# Patient Record
Sex: FEMALE | Race: WHITE | NOT HISPANIC OR LATINO | Employment: OTHER | URBAN - NONMETROPOLITAN AREA
[De-identification: names, ages, dates, MRNs, and addresses within clinical notes are randomized per-mention and may not be internally consistent; named-entity substitution may affect disease eponyms.]

---

## 2017-04-03 ENCOUNTER — TRANSCRIBE ORDERS (OUTPATIENT)
Dept: ADMINISTRATIVE | Facility: HOSPITAL | Age: 47
End: 2017-04-03

## 2017-04-03 DIAGNOSIS — Z12.31 VISIT FOR SCREENING MAMMOGRAM: Primary | ICD-10-CM

## 2017-04-26 ENCOUNTER — HOSPITAL ENCOUNTER (OUTPATIENT)
Dept: MAMMOGRAPHY | Facility: HOSPITAL | Age: 47
Discharge: HOME | End: 2017-04-26

## 2018-12-06 ENCOUNTER — APPOINTMENT (OUTPATIENT)
Dept: WOMENS IMAGING | Facility: HOSPITAL | Age: 48
End: 2018-12-06

## 2018-12-06 PROCEDURE — 77067 SCR MAMMO BI INCL CAD: CPT | Performed by: RADIOLOGY

## 2018-12-06 PROCEDURE — 77063 BREAST TOMOSYNTHESIS BI: CPT | Performed by: RADIOLOGY

## 2020-02-10 ENCOUNTER — APPOINTMENT (OUTPATIENT)
Dept: CT IMAGING | Facility: HOSPITAL | Age: 50
End: 2020-02-10

## 2020-02-10 ENCOUNTER — APPOINTMENT (OUTPATIENT)
Dept: ULTRASOUND IMAGING | Facility: HOSPITAL | Age: 50
End: 2020-02-10

## 2020-02-10 ENCOUNTER — APPOINTMENT (OUTPATIENT)
Dept: NUCLEAR MEDICINE | Facility: HOSPITAL | Age: 50
End: 2020-02-10

## 2020-02-10 ENCOUNTER — HOSPITAL ENCOUNTER (INPATIENT)
Facility: HOSPITAL | Age: 50
LOS: 3 days | Discharge: HOME | End: 2020-02-13
Attending: FAMILY MEDICINE | Admitting: FAMILY MEDICINE

## 2020-02-10 DIAGNOSIS — I95.9 HYPOTENSION, UNSPECIFIED HYPOTENSION TYPE: Primary | ICD-10-CM

## 2020-02-10 DIAGNOSIS — N17.9 ACUTE RENAL FAILURE, UNSPECIFIED ACUTE RENAL FAILURE TYPE (HCC): ICD-10-CM

## 2020-02-10 DIAGNOSIS — R40.0 DROWSINESS: ICD-10-CM

## 2020-02-10 LAB
6-ACETYL MORPHINE: NEGATIVE
A-A DO2: 14.8 MMHG (ref 0–300)
ALBUMIN SERPL-MCNC: 3.33 G/DL (ref 3.5–5.2)
ALBUMIN/GLOB SERPL: 0.8 G/DL
ALP SERPL-CCNC: 95 U/L (ref 39–117)
ALT SERPL W P-5'-P-CCNC: 23 U/L (ref 1–33)
AMPHET+METHAMPHET UR QL: NEGATIVE
ANION GAP SERPL CALCULATED.3IONS-SCNC: 13.5 MMOL/L (ref 5–15)
APAP SERPL-MCNC: <5 MCG/ML (ref 10–30)
ARTERIAL PATENCY WRIST A: POSITIVE
AST SERPL-CCNC: 50 U/L (ref 1–32)
ATMOSPHERIC PRESS: 729 MMHG
B-HCG UR QL: NEGATIVE
BACTERIA UR QL AUTO: ABNORMAL /HPF
BARBITURATES UR QL SCN: NEGATIVE
BASE EXCESS BLDA CALC-SCNC: 2 MMOL/L (ref 0–2)
BASOPHILS # BLD AUTO: 0.05 10*3/MM3 (ref 0–0.2)
BASOPHILS NFR BLD AUTO: 0.6 % (ref 0–1.5)
BDY SITE: ABNORMAL
BENZODIAZ UR QL SCN: POSITIVE
BILIRUB SERPL-MCNC: 0.6 MG/DL (ref 0.2–1.2)
BILIRUB UR QL STRIP: ABNORMAL
BODY TEMPERATURE: 0 C
BUN BLD-MCNC: 15 MG/DL (ref 6–20)
BUN/CREAT SERPL: 9.9 (ref 7–25)
BUPRENORPHINE SERPL-MCNC: NEGATIVE NG/ML
CALCIUM SPEC-SCNC: 8.9 MG/DL (ref 8.6–10.5)
CANNABINOIDS SERPL QL: NEGATIVE
CHLORIDE SERPL-SCNC: 97 MMOL/L (ref 98–107)
CLARITY UR: ABNORMAL
CO2 BLDA-SCNC: 29.2 MMOL/L (ref 22–33)
CO2 SERPL-SCNC: 28.5 MMOL/L (ref 22–29)
COCAINE UR QL: NEGATIVE
COHGB MFR BLD: 2.6 % (ref 0–5)
COLOR UR: ABNORMAL
CREAT BLD-MCNC: 1.52 MG/DL (ref 0.57–1)
CRP SERPL-MCNC: 0.17 MG/DL (ref 0–0.5)
D DIMER PPP FEU-MCNC: 0.6 MCGFEU/ML (ref 0–0.5)
D-LACTATE SERPL-SCNC: 1.6 MMOL/L (ref 0.5–2)
D-LACTATE SERPL-SCNC: 2.3 MMOL/L (ref 0.5–2)
DEPRECATED RDW RBC AUTO: 51.5 FL (ref 37–54)
EOSINOPHIL # BLD AUTO: 0.23 10*3/MM3 (ref 0–0.4)
EOSINOPHIL NFR BLD AUTO: 2.7 % (ref 0.3–6.2)
ERYTHROCYTE [DISTWIDTH] IN BLOOD BY AUTOMATED COUNT: 13.5 % (ref 12.3–15.4)
ERYTHROCYTE [SEDIMENTATION RATE] IN BLOOD: 17 MM/HR (ref 0–20)
ETHANOL BLD-MCNC: <10 MG/DL (ref 0–10)
ETHANOL UR QL: <0.01 %
GFR SERPL CREATININE-BSD FRML MDRD: 37 ML/MIN/1.73
GLOBULIN UR ELPH-MCNC: 4 GM/DL
GLUCOSE BLD-MCNC: 169 MG/DL (ref 65–99)
GLUCOSE BLDC GLUCOMTR-MCNC: 215 MG/DL (ref 70–130)
GLUCOSE BLDC GLUCOMTR-MCNC: 79 MG/DL (ref 70–130)
GLUCOSE UR STRIP-MCNC: NEGATIVE MG/DL
HAV IGM SERPL QL IA: NORMAL
HBA1C MFR BLD: 8.6 % (ref 4.8–5.6)
HBV CORE IGM SERPL QL IA: NORMAL
HBV SURFACE AG SERPL QL IA: NORMAL
HCO3 BLDA-SCNC: 27.8 MMOL/L (ref 20–26)
HCT VFR BLD AUTO: 44.1 % (ref 34–46.6)
HCT VFR BLD CALC: 42.1 % (ref 38–51)
HCV AB SER DONR QL: NORMAL
HGB BLD-MCNC: 14.3 G/DL (ref 12–15.9)
HGB BLDA-MCNC: 13.7 G/DL (ref 13.5–17.5)
HGB UR QL STRIP.AUTO: NEGATIVE
HOLD SPECIMEN: NORMAL
HOROWITZ INDEX BLD+IHG-RTO: 21 %
HYALINE CASTS UR QL AUTO: ABNORMAL /LPF
IMM GRANULOCYTES # BLD AUTO: 0.01 10*3/MM3 (ref 0–0.05)
IMM GRANULOCYTES NFR BLD AUTO: 0.1 % (ref 0–0.5)
KETONES UR QL STRIP: ABNORMAL
LEUKOCYTE ESTERASE UR QL STRIP.AUTO: ABNORMAL
LIPASE SERPL-CCNC: 23 U/L (ref 13–60)
LYMPHOCYTES # BLD AUTO: 4.64 10*3/MM3 (ref 0.7–3.1)
LYMPHOCYTES NFR BLD AUTO: 55.1 % (ref 19.6–45.3)
Lab: ABNORMAL
MCH RBC QN AUTO: 32.9 PG (ref 26.6–33)
MCHC RBC AUTO-ENTMCNC: 32.4 G/DL (ref 31.5–35.7)
MCV RBC AUTO: 101.6 FL (ref 79–97)
METHADONE UR QL SCN: NEGATIVE
METHGB BLD QL: 0.2 % (ref 0–3)
MODALITY: ABNORMAL
MONOCYTES # BLD AUTO: 0.73 10*3/MM3 (ref 0.1–0.9)
MONOCYTES NFR BLD AUTO: 8.7 % (ref 5–12)
NEUTROPHILS # BLD AUTO: 2.76 10*3/MM3 (ref 1.7–7)
NEUTROPHILS NFR BLD AUTO: 32.8 % (ref 42.7–76)
NITRITE UR QL STRIP: POSITIVE
NOTE: ABNORMAL
NOTIFIED BY: ABNORMAL
NOTIFIED WHO: ABNORMAL
NRBC BLD AUTO-RTO: 0 /100 WBC (ref 0–0.2)
OPIATES UR QL: NEGATIVE
OXYCODONE UR QL SCN: NEGATIVE
OXYHGB MFR BLDV: 92.6 % (ref 94–99)
PCO2 BLDA: 46.8 MM HG (ref 35–45)
PCO2 TEMP ADJ BLD: ABNORMAL MM[HG]
PCP UR QL SCN: NEGATIVE
PH BLDA: 7.38 PH UNITS (ref 7.35–7.45)
PH UR STRIP.AUTO: 6 [PH] (ref 5–8)
PH, TEMP CORRECTED: ABNORMAL
PLATELET # BLD AUTO: 103 10*3/MM3 (ref 140–450)
PMV BLD AUTO: 12.1 FL (ref 6–12)
PO2 BLDA: 75.7 MM HG (ref 83–108)
PO2 TEMP ADJ BLD: ABNORMAL MM[HG]
POTASSIUM BLD-SCNC: 3.9 MMOL/L (ref 3.5–5.2)
PROT SERPL-MCNC: 7.3 G/DL (ref 6–8.5)
PROT UR QL STRIP: ABNORMAL
RBC # BLD AUTO: 4.34 10*6/MM3 (ref 3.77–5.28)
RBC # UR: ABNORMAL /HPF
REF LAB TEST METHOD: ABNORMAL
SAO2 % BLDCOA: 95.3 % (ref 94–99)
SMALL PLATELETS BLD QL SMEAR: NORMAL
SODIUM BLD-SCNC: 139 MMOL/L (ref 136–145)
SP GR UR STRIP: 1.02 (ref 1–1.03)
SQUAMOUS #/AREA URNS HPF: ABNORMAL /HPF
STOMATOCYTES BLD QL SMEAR: NORMAL
TSH SERPL DL<=0.05 MIU/L-ACNC: 2.37 UIU/ML (ref 0.27–4.2)
UROBILINOGEN UR QL STRIP: ABNORMAL
VENTILATOR MODE: ABNORMAL
WBC NRBC COR # BLD: 8.42 10*3/MM3 (ref 3.4–10.8)
WBC UR QL AUTO: ABNORMAL /HPF

## 2020-02-10 PROCEDURE — 82375 ASSAY CARBOXYHB QUANT: CPT

## 2020-02-10 PROCEDURE — 80307 DRUG TEST PRSMV CHEM ANLYZR: CPT | Performed by: PHYSICIAN ASSISTANT

## 2020-02-10 PROCEDURE — 71250 CT THORAX DX C-: CPT | Performed by: RADIOLOGY

## 2020-02-10 PROCEDURE — 85007 BL SMEAR W/DIFF WBC COUNT: CPT | Performed by: PHYSICIAN ASSISTANT

## 2020-02-10 PROCEDURE — 84443 ASSAY THYROID STIM HORMONE: CPT | Performed by: PHYSICIAN ASSISTANT

## 2020-02-10 PROCEDURE — 83036 HEMOGLOBIN GLYCOSYLATED A1C: CPT | Performed by: NURSE PRACTITIONER

## 2020-02-10 PROCEDURE — 87040 BLOOD CULTURE FOR BACTERIA: CPT | Performed by: PHYSICIAN ASSISTANT

## 2020-02-10 PROCEDURE — 80053 COMPREHEN METABOLIC PANEL: CPT | Performed by: PHYSICIAN ASSISTANT

## 2020-02-10 PROCEDURE — 25010000002 ONDANSETRON PER 1 MG: Performed by: EMERGENCY MEDICINE

## 2020-02-10 PROCEDURE — 94799 UNLISTED PULMONARY SVC/PX: CPT

## 2020-02-10 PROCEDURE — 80307 DRUG TEST PRSMV CHEM ANLYZR: CPT | Performed by: NURSE PRACTITIONER

## 2020-02-10 PROCEDURE — 70450 CT HEAD/BRAIN W/O DYE: CPT | Performed by: RADIOLOGY

## 2020-02-10 PROCEDURE — 51798 US URINE CAPACITY MEASURE: CPT

## 2020-02-10 PROCEDURE — 0 TECHNETIUM TC 99M PENTETATE INHALER: Performed by: EMERGENCY MEDICINE

## 2020-02-10 PROCEDURE — 87147 CULTURE TYPE IMMUNOLOGIC: CPT | Performed by: PHYSICIAN ASSISTANT

## 2020-02-10 PROCEDURE — 82805 BLOOD GASES W/O2 SATURATION: CPT

## 2020-02-10 PROCEDURE — 74176 CT ABD & PELVIS W/O CONTRAST: CPT

## 2020-02-10 PROCEDURE — 87086 URINE CULTURE/COLONY COUNT: CPT | Performed by: PHYSICIAN ASSISTANT

## 2020-02-10 PROCEDURE — A9540 TC99M MAA: HCPCS | Performed by: EMERGENCY MEDICINE

## 2020-02-10 PROCEDURE — 0 TECHNETIUM ALBUMIN AGGREGATED: Performed by: EMERGENCY MEDICINE

## 2020-02-10 PROCEDURE — 83050 HGB METHEMOGLOBIN QUAN: CPT

## 2020-02-10 PROCEDURE — 82607 VITAMIN B-12: CPT | Performed by: NURSE PRACTITIONER

## 2020-02-10 PROCEDURE — 82962 GLUCOSE BLOOD TEST: CPT

## 2020-02-10 PROCEDURE — 93005 ELECTROCARDIOGRAM TRACING: CPT | Performed by: NURSE PRACTITIONER

## 2020-02-10 PROCEDURE — 76705 ECHO EXAM OF ABDOMEN: CPT | Performed by: RADIOLOGY

## 2020-02-10 PROCEDURE — 85379 FIBRIN DEGRADATION QUANT: CPT | Performed by: PHYSICIAN ASSISTANT

## 2020-02-10 PROCEDURE — 93010 ELECTROCARDIOGRAM REPORT: CPT | Performed by: INTERNAL MEDICINE

## 2020-02-10 PROCEDURE — 80074 ACUTE HEPATITIS PANEL: CPT | Performed by: NURSE PRACTITIONER

## 2020-02-10 PROCEDURE — A9567 TECHNETIUM TC-99M AEROSOL: HCPCS | Performed by: EMERGENCY MEDICINE

## 2020-02-10 PROCEDURE — 25010000002 HEPARIN (PORCINE) PER 1000 UNITS: Performed by: FAMILY MEDICINE

## 2020-02-10 PROCEDURE — 85025 COMPLETE CBC W/AUTO DIFF WBC: CPT | Performed by: PHYSICIAN ASSISTANT

## 2020-02-10 PROCEDURE — 81025 URINE PREGNANCY TEST: CPT | Performed by: PHYSICIAN ASSISTANT

## 2020-02-10 PROCEDURE — 36415 COLL VENOUS BLD VENIPUNCTURE: CPT

## 2020-02-10 PROCEDURE — 74176 CT ABD & PELVIS W/O CONTRAST: CPT | Performed by: RADIOLOGY

## 2020-02-10 PROCEDURE — 78582 LUNG VENTILAT&PERFUS IMAGING: CPT | Performed by: RADIOLOGY

## 2020-02-10 PROCEDURE — 81001 URINALYSIS AUTO W/SCOPE: CPT | Performed by: PHYSICIAN ASSISTANT

## 2020-02-10 PROCEDURE — 85652 RBC SED RATE AUTOMATED: CPT | Performed by: PHYSICIAN ASSISTANT

## 2020-02-10 PROCEDURE — 99223 1ST HOSP IP/OBS HIGH 75: CPT | Performed by: NURSE PRACTITIONER

## 2020-02-10 PROCEDURE — 99284 EMERGENCY DEPT VISIT MOD MDM: CPT

## 2020-02-10 PROCEDURE — 36600 WITHDRAWAL OF ARTERIAL BLOOD: CPT

## 2020-02-10 PROCEDURE — 71250 CT THORAX DX C-: CPT

## 2020-02-10 PROCEDURE — 25010000002 CEFTRIAXONE: Performed by: PHYSICIAN ASSISTANT

## 2020-02-10 PROCEDURE — 93970 EXTREMITY STUDY: CPT

## 2020-02-10 PROCEDURE — 82746 ASSAY OF FOLIC ACID SERUM: CPT | Performed by: NURSE PRACTITIONER

## 2020-02-10 PROCEDURE — 93970 EXTREMITY STUDY: CPT | Performed by: RADIOLOGY

## 2020-02-10 PROCEDURE — 83690 ASSAY OF LIPASE: CPT | Performed by: PHYSICIAN ASSISTANT

## 2020-02-10 PROCEDURE — 70450 CT HEAD/BRAIN W/O DYE: CPT

## 2020-02-10 PROCEDURE — 63710000001 INSULIN ASPART PER 5 UNITS: Performed by: NURSE PRACTITIONER

## 2020-02-10 PROCEDURE — 86140 C-REACTIVE PROTEIN: CPT | Performed by: PHYSICIAN ASSISTANT

## 2020-02-10 PROCEDURE — 83605 ASSAY OF LACTIC ACID: CPT | Performed by: PHYSICIAN ASSISTANT

## 2020-02-10 PROCEDURE — 76705 ECHO EXAM OF ABDOMEN: CPT

## 2020-02-10 PROCEDURE — 78582 LUNG VENTILAT&PERFUS IMAGING: CPT

## 2020-02-10 RX ORDER — NICOTINE 21 MG/24HR
1 PATCH, TRANSDERMAL 24 HOURS TRANSDERMAL
Status: DISCONTINUED | OUTPATIENT
Start: 2020-02-10 | End: 2020-02-13 | Stop reason: HOSPADM

## 2020-02-10 RX ORDER — ONDANSETRON 2 MG/ML
4 INJECTION INTRAMUSCULAR; INTRAVENOUS EVERY 6 HOURS PRN
Status: DISCONTINUED | OUTPATIENT
Start: 2020-02-10 | End: 2020-02-13 | Stop reason: HOSPADM

## 2020-02-10 RX ORDER — SODIUM CHLORIDE 0.9 % (FLUSH) 0.9 %
10 SYRINGE (ML) INJECTION AS NEEDED
Status: DISCONTINUED | OUTPATIENT
Start: 2020-02-10 | End: 2020-02-13 | Stop reason: HOSPADM

## 2020-02-10 RX ORDER — METOPROLOL TARTRATE 50 MG/1
75 TABLET, FILM COATED ORAL 2 TIMES DAILY
COMMUNITY
End: 2020-02-13 | Stop reason: HOSPADM

## 2020-02-10 RX ORDER — ALPRAZOLAM 0.5 MG/1
0.5 TABLET ORAL 2 TIMES DAILY PRN
Status: ON HOLD | COMMUNITY
End: 2020-02-10

## 2020-02-10 RX ORDER — LOSARTAN POTASSIUM 25 MG/1
12.5 TABLET ORAL NIGHTLY
COMMUNITY
End: 2020-02-13 | Stop reason: HOSPADM

## 2020-02-10 RX ORDER — PREGABALIN 75 MG/1
150 CAPSULE ORAL EVERY 12 HOURS SCHEDULED
Status: CANCELLED | OUTPATIENT
Start: 2020-02-10

## 2020-02-10 RX ORDER — CLONAZEPAM 0.5 MG/1
0.5 TABLET ORAL 3 TIMES DAILY PRN
COMMUNITY
End: 2021-08-20

## 2020-02-10 RX ORDER — TIZANIDINE 4 MG/1
6 TABLET ORAL EVERY 12 HOURS SCHEDULED
Status: CANCELLED | OUTPATIENT
Start: 2020-02-10

## 2020-02-10 RX ORDER — DEXTROSE MONOHYDRATE 25 G/50ML
25 INJECTION, SOLUTION INTRAVENOUS
Status: DISCONTINUED | OUTPATIENT
Start: 2020-02-10 | End: 2020-02-13 | Stop reason: HOSPADM

## 2020-02-10 RX ORDER — L.ACID,PARA/B.BIFIDUM/S.THERM 8B CELL
1 CAPSULE ORAL DAILY
COMMUNITY
End: 2021-08-20

## 2020-02-10 RX ORDER — HYDROCHLOROTHIAZIDE 25 MG/1
25 TABLET ORAL DAILY
Status: CANCELLED | OUTPATIENT
Start: 2020-02-10

## 2020-02-10 RX ORDER — RANOLAZINE 500 MG/1
500 TABLET, EXTENDED RELEASE ORAL EVERY 12 HOURS SCHEDULED
Status: CANCELLED | OUTPATIENT
Start: 2020-02-10

## 2020-02-10 RX ORDER — PREGABALIN 150 MG/1
150 CAPSULE ORAL 2 TIMES DAILY
COMMUNITY
End: 2021-08-20

## 2020-02-10 RX ORDER — MULTIVITAMIN
1 TABLET ORAL NIGHTLY
COMMUNITY

## 2020-02-10 RX ORDER — TIZANIDINE 4 MG/1
4 TABLET ORAL NIGHTLY PRN
Status: ON HOLD | COMMUNITY
End: 2020-02-10

## 2020-02-10 RX ORDER — CLONAZEPAM 0.5 MG/1
0.5 TABLET ORAL 3 TIMES DAILY
Status: CANCELLED | OUTPATIENT
Start: 2020-02-10

## 2020-02-10 RX ORDER — NICOTINE POLACRILEX 4 MG
15 LOZENGE BUCCAL
Status: DISCONTINUED | OUTPATIENT
Start: 2020-02-10 | End: 2020-02-13 | Stop reason: HOSPADM

## 2020-02-10 RX ORDER — DULOXETIN HYDROCHLORIDE 60 MG/1
60 CAPSULE, DELAYED RELEASE ORAL 2 TIMES DAILY
COMMUNITY

## 2020-02-10 RX ORDER — ALBUTEROL SULFATE 2.5 MG/3ML
2.5 SOLUTION RESPIRATORY (INHALATION) EVERY 4 HOURS PRN
Status: DISCONTINUED | OUTPATIENT
Start: 2020-02-10 | End: 2020-02-13 | Stop reason: HOSPADM

## 2020-02-10 RX ORDER — LOSARTAN POTASSIUM 25 MG/1
12.5 TABLET ORAL NIGHTLY
Status: CANCELLED | OUTPATIENT
Start: 2020-02-10

## 2020-02-10 RX ORDER — ATORVASTATIN CALCIUM 40 MG/1
40 TABLET, FILM COATED ORAL DAILY
Status: DISCONTINUED | OUTPATIENT
Start: 2020-02-10 | End: 2020-02-13 | Stop reason: HOSPADM

## 2020-02-10 RX ORDER — L.ACID,PARA/B.BIFIDUM/S.THERM 8B CELL
1 CAPSULE ORAL DAILY
Status: DISCONTINUED | OUTPATIENT
Start: 2020-02-10 | End: 2020-02-13 | Stop reason: HOSPADM

## 2020-02-10 RX ORDER — ACETAMINOPHEN 325 MG/1
650 TABLET ORAL EVERY 4 HOURS PRN
Status: DISCONTINUED | OUTPATIENT
Start: 2020-02-10 | End: 2020-02-13 | Stop reason: HOSPADM

## 2020-02-10 RX ORDER — UBIDECARENONE 100 MG
200 CAPSULE ORAL 2 TIMES DAILY
COMMUNITY
End: 2020-03-20 | Stop reason: HOSPADM

## 2020-02-10 RX ORDER — VITAMIN E 268 MG
400 CAPSULE ORAL DAILY
Status: ON HOLD | COMMUNITY
End: 2020-03-17

## 2020-02-10 RX ORDER — NITROGLYCERIN 0.4 MG/1
0.4 TABLET SUBLINGUAL
Status: DISCONTINUED | OUTPATIENT
Start: 2020-02-10 | End: 2020-02-13 | Stop reason: HOSPADM

## 2020-02-10 RX ORDER — SODIUM CHLORIDE 9 MG/ML
125 INJECTION, SOLUTION INTRAVENOUS CONTINUOUS
Status: DISCONTINUED | OUTPATIENT
Start: 2020-02-10 | End: 2020-02-12

## 2020-02-10 RX ORDER — ATORVASTATIN CALCIUM 40 MG/1
40 TABLET, FILM COATED ORAL NIGHTLY
COMMUNITY
End: 2020-03-20 | Stop reason: HOSPADM

## 2020-02-10 RX ORDER — PANTOPRAZOLE SODIUM 40 MG/1
40 TABLET, DELAYED RELEASE ORAL 2 TIMES DAILY
Status: DISCONTINUED | OUTPATIENT
Start: 2020-02-10 | End: 2020-02-13 | Stop reason: HOSPADM

## 2020-02-10 RX ORDER — ONDANSETRON 2 MG/ML
4 INJECTION INTRAMUSCULAR; INTRAVENOUS ONCE
Status: COMPLETED | OUTPATIENT
Start: 2020-02-10 | End: 2020-02-10

## 2020-02-10 RX ORDER — QUETIAPINE FUMARATE 300 MG/1
150 TABLET, FILM COATED ORAL NIGHTLY
Status: ON HOLD | COMMUNITY
End: 2020-03-17

## 2020-02-10 RX ORDER — PANTOPRAZOLE SODIUM 40 MG/1
40 TABLET, DELAYED RELEASE ORAL 2 TIMES DAILY
COMMUNITY

## 2020-02-10 RX ORDER — ALBUTEROL SULFATE 90 UG/1
2 AEROSOL, METERED RESPIRATORY (INHALATION) EVERY 4 HOURS PRN
Status: ON HOLD | COMMUNITY
End: 2020-03-20 | Stop reason: SDUPTHER

## 2020-02-10 RX ORDER — DULOXETIN HYDROCHLORIDE 60 MG/1
60 CAPSULE, DELAYED RELEASE ORAL 2 TIMES DAILY
Status: DISCONTINUED | OUTPATIENT
Start: 2020-02-10 | End: 2020-02-13 | Stop reason: HOSPADM

## 2020-02-10 RX ORDER — HYDROCHLOROTHIAZIDE 25 MG/1
25 TABLET ORAL DAILY
COMMUNITY
End: 2020-02-13 | Stop reason: HOSPADM

## 2020-02-10 RX ORDER — VITAMIN E 268 MG
400 CAPSULE ORAL DAILY
Status: DISCONTINUED | OUTPATIENT
Start: 2020-02-10 | End: 2020-02-13 | Stop reason: HOSPADM

## 2020-02-10 RX ORDER — RANOLAZINE 500 MG/1
500 TABLET, EXTENDED RELEASE ORAL 2 TIMES DAILY
COMMUNITY

## 2020-02-10 RX ORDER — SODIUM CHLORIDE 0.9 % (FLUSH) 0.9 %
10 SYRINGE (ML) INJECTION EVERY 12 HOURS SCHEDULED
Status: DISCONTINUED | OUTPATIENT
Start: 2020-02-10 | End: 2020-02-13 | Stop reason: HOSPADM

## 2020-02-10 RX ORDER — HEPARIN SODIUM 5000 [USP'U]/ML
5000 INJECTION, SOLUTION INTRAVENOUS; SUBCUTANEOUS EVERY 12 HOURS SCHEDULED
Status: DISCONTINUED | OUTPATIENT
Start: 2020-02-10 | End: 2020-02-13

## 2020-02-10 RX ORDER — SODIUM CHLORIDE 9 MG/ML
100 INJECTION, SOLUTION INTRAVENOUS CONTINUOUS
Status: DISCONTINUED | OUTPATIENT
Start: 2020-02-10 | End: 2020-02-10

## 2020-02-10 RX ORDER — MULTIVITAMIN
1 TABLET ORAL NIGHTLY
Status: DISCONTINUED | OUTPATIENT
Start: 2020-02-10 | End: 2020-02-13 | Stop reason: HOSPADM

## 2020-02-10 RX ORDER — TIZANIDINE HYDROCHLORIDE 6 MG/1
6 CAPSULE, GELATIN COATED ORAL 2 TIMES DAILY
COMMUNITY
End: 2020-02-13 | Stop reason: HOSPADM

## 2020-02-10 RX ORDER — ALBUTEROL SULFATE 2.5 MG/3ML
2.5 SOLUTION RESPIRATORY (INHALATION) EVERY 4 HOURS PRN
Status: ON HOLD | COMMUNITY
End: 2020-02-10

## 2020-02-10 RX ADMIN — SODIUM CHLORIDE 1572 ML: 9 INJECTION, SOLUTION INTRAVENOUS at 08:59

## 2020-02-10 RX ADMIN — Medication 1 DOSE: at 11:54

## 2020-02-10 RX ADMIN — ATORVASTATIN CALCIUM 40 MG: 40 TABLET, FILM COATED ORAL at 20:42

## 2020-02-10 RX ADMIN — SODIUM CHLORIDE, PRESERVATIVE FREE 10 ML: 5 INJECTION INTRAVENOUS at 20:42

## 2020-02-10 RX ADMIN — SODIUM CHLORIDE 1000 ML: 9 INJECTION, SOLUTION INTRAVENOUS at 06:50

## 2020-02-10 RX ADMIN — Medication 1 DOSE: at 12:10

## 2020-02-10 RX ADMIN — SODIUM CHLORIDE 1000 ML: 9 INJECTION, SOLUTION INTRAVENOUS at 07:21

## 2020-02-10 RX ADMIN — ONDANSETRON 4 MG: 2 INJECTION INTRAMUSCULAR; INTRAVENOUS at 13:20

## 2020-02-10 RX ADMIN — HEPARIN SODIUM 5000 UNITS: 5000 INJECTION INTRAVENOUS; SUBCUTANEOUS at 20:42

## 2020-02-10 RX ADMIN — Medication 1 CAPSULE: at 20:42

## 2020-02-10 RX ADMIN — Medication 1 TABLET: at 20:41

## 2020-02-10 RX ADMIN — SODIUM CHLORIDE 125 ML/HR: 9 INJECTION, SOLUTION INTRAVENOUS at 17:05

## 2020-02-10 RX ADMIN — PANTOPRAZOLE SODIUM 40 MG: 40 TABLET, DELAYED RELEASE ORAL at 20:41

## 2020-02-10 RX ADMIN — QUETIAPINE FUMARATE 150 MG: 25 TABLET, FILM COATED ORAL at 20:41

## 2020-02-10 RX ADMIN — ACETAMINOPHEN 650 MG: 325 TABLET ORAL at 17:58

## 2020-02-10 RX ADMIN — NICOTINE 1 PATCH: 21 PATCH TRANSDERMAL at 17:05

## 2020-02-10 RX ADMIN — ESTROGENS, CONJUGATED 0.6 MG: 0.3 TABLET, FILM COATED ORAL at 20:41

## 2020-02-10 RX ADMIN — Medication 400 UNITS: at 20:42

## 2020-02-10 RX ADMIN — DULOXETINE HYDROCHLORIDE 60 MG: 60 CAPSULE, DELAYED RELEASE ORAL at 20:41

## 2020-02-10 RX ADMIN — CEFTRIAXONE 2 G: 2 INJECTION, POWDER, FOR SOLUTION INTRAMUSCULAR; INTRAVENOUS at 09:13

## 2020-02-11 LAB
ALBUMIN SERPL-MCNC: 3.06 G/DL (ref 3.5–5.2)
ALBUMIN/GLOB SERPL: 0.8 G/DL
ALP SERPL-CCNC: 88 U/L (ref 39–117)
ALT SERPL W P-5'-P-CCNC: 28 U/L (ref 1–33)
ANION GAP SERPL CALCULATED.3IONS-SCNC: 9.1 MMOL/L (ref 5–15)
AST SERPL-CCNC: 71 U/L (ref 1–32)
BACTERIA SPEC AEROBE CULT: ABNORMAL
BASOPHILS # BLD MANUAL: 0.08 10*3/MM3 (ref 0–0.2)
BASOPHILS NFR BLD AUTO: 1 % (ref 0–1.5)
BILIRUB SERPL-MCNC: 0.5 MG/DL (ref 0.2–1.2)
BUN BLD-MCNC: 11 MG/DL (ref 6–20)
BUN/CREAT SERPL: 9.6 (ref 7–25)
CALCIUM SPEC-SCNC: 8.5 MG/DL (ref 8.6–10.5)
CHLORIDE SERPL-SCNC: 105 MMOL/L (ref 98–107)
CO2 SERPL-SCNC: 23.9 MMOL/L (ref 22–29)
CREAT BLD-MCNC: 1.14 MG/DL (ref 0.57–1)
D-LACTATE SERPL-SCNC: 1.8 MMOL/L (ref 0.5–2)
DEPRECATED RDW RBC AUTO: 52.6 FL (ref 37–54)
EOSINOPHIL # BLD MANUAL: 0.15 10*3/MM3 (ref 0–0.4)
EOSINOPHIL NFR BLD MANUAL: 2 % (ref 0.3–6.2)
ERYTHROCYTE [DISTWIDTH] IN BLOOD BY AUTOMATED COUNT: 13.5 % (ref 12.3–15.4)
FOLATE SERPL-MCNC: 18.3 NG/ML (ref 4.78–24.2)
GFR SERPL CREATININE-BSD FRML MDRD: 51 ML/MIN/1.73
GLOBULIN UR ELPH-MCNC: 3.7 GM/DL
GLUCOSE BLD-MCNC: 213 MG/DL (ref 65–99)
GLUCOSE BLDC GLUCOMTR-MCNC: 147 MG/DL (ref 70–130)
GLUCOSE BLDC GLUCOMTR-MCNC: 172 MG/DL (ref 70–130)
GLUCOSE BLDC GLUCOMTR-MCNC: 210 MG/DL (ref 70–130)
GLUCOSE BLDC GLUCOMTR-MCNC: 238 MG/DL (ref 70–130)
GLUCOSE BLDC GLUCOMTR-MCNC: 245 MG/DL (ref 70–130)
HCT VFR BLD AUTO: 44.6 % (ref 34–46.6)
HGB BLD-MCNC: 13.9 G/DL (ref 12–15.9)
HOLD SPECIMEN: NORMAL
HYPOCHROMIA BLD QL: ABNORMAL
LYMPHOCYTES # BLD MANUAL: 4.29 10*3/MM3 (ref 0.7–3.1)
LYMPHOCYTES NFR BLD MANUAL: 4 % (ref 5–12)
LYMPHOCYTES NFR BLD MANUAL: 57 % (ref 19.6–45.3)
MACROCYTES BLD QL SMEAR: ABNORMAL
MCH RBC QN AUTO: 32.8 PG (ref 26.6–33)
MCHC RBC AUTO-ENTMCNC: 31.2 G/DL (ref 31.5–35.7)
MCV RBC AUTO: 105.2 FL (ref 79–97)
MONOCYTES # BLD AUTO: 0.3 10*3/MM3 (ref 0.1–0.9)
NEUTROPHILS # BLD AUTO: 2.71 10*3/MM3 (ref 1.7–7)
NEUTROPHILS NFR BLD MANUAL: 36 % (ref 42.7–76)
PLAT MORPH BLD: NORMAL
PLATELET # BLD AUTO: 96 10*3/MM3 (ref 140–450)
PMV BLD AUTO: 12.1 FL (ref 6–12)
POTASSIUM BLD-SCNC: 3.8 MMOL/L (ref 3.5–5.2)
PROT SERPL-MCNC: 6.8 G/DL (ref 6–8.5)
RBC # BLD AUTO: 4.24 10*6/MM3 (ref 3.77–5.28)
SCAN SLIDE: NORMAL
SODIUM BLD-SCNC: 138 MMOL/L (ref 136–145)
VIT B12 BLD-MCNC: 746 PG/ML (ref 211–946)
WBC NRBC COR # BLD: 7.53 10*3/MM3 (ref 3.4–10.8)

## 2020-02-11 PROCEDURE — 99232 SBSQ HOSP IP/OBS MODERATE 35: CPT | Performed by: FAMILY MEDICINE

## 2020-02-11 PROCEDURE — 83605 ASSAY OF LACTIC ACID: CPT | Performed by: FAMILY MEDICINE

## 2020-02-11 PROCEDURE — 85025 COMPLETE CBC W/AUTO DIFF WBC: CPT | Performed by: NURSE PRACTITIONER

## 2020-02-11 PROCEDURE — 80053 COMPREHEN METABOLIC PANEL: CPT | Performed by: NURSE PRACTITIONER

## 2020-02-11 PROCEDURE — 85007 BL SMEAR W/DIFF WBC COUNT: CPT | Performed by: NURSE PRACTITIONER

## 2020-02-11 PROCEDURE — 82962 GLUCOSE BLOOD TEST: CPT

## 2020-02-11 PROCEDURE — 25010000002 CEFTRIAXONE: Performed by: FAMILY MEDICINE

## 2020-02-11 PROCEDURE — 25010000002 HEPARIN (PORCINE) PER 1000 UNITS: Performed by: FAMILY MEDICINE

## 2020-02-11 PROCEDURE — 94799 UNLISTED PULMONARY SVC/PX: CPT

## 2020-02-11 PROCEDURE — 25010000002 ONDANSETRON PER 1 MG: Performed by: FAMILY MEDICINE

## 2020-02-11 RX ORDER — TIZANIDINE 4 MG/1
6 TABLET ORAL EVERY 12 HOURS SCHEDULED
Status: CANCELLED | OUTPATIENT
Start: 2020-02-11

## 2020-02-11 RX ORDER — PREGABALIN 75 MG/1
150 CAPSULE ORAL EVERY 12 HOURS SCHEDULED
Status: DISCONTINUED | OUTPATIENT
Start: 2020-02-11 | End: 2020-02-13 | Stop reason: HOSPADM

## 2020-02-11 RX ORDER — RANOLAZINE 500 MG/1
500 TABLET, EXTENDED RELEASE ORAL EVERY 12 HOURS SCHEDULED
Status: CANCELLED | OUTPATIENT
Start: 2020-02-11

## 2020-02-11 RX ORDER — CLONAZEPAM 0.5 MG/1
0.5 TABLET ORAL 2 TIMES DAILY PRN
Status: DISCONTINUED | OUTPATIENT
Start: 2020-02-11 | End: 2020-02-13 | Stop reason: HOSPADM

## 2020-02-11 RX ADMIN — PREGABALIN 150 MG: 75 CAPSULE ORAL at 09:52

## 2020-02-11 RX ADMIN — Medication 400 UNITS: at 08:30

## 2020-02-11 RX ADMIN — HEPARIN SODIUM 5000 UNITS: 5000 INJECTION INTRAVENOUS; SUBCUTANEOUS at 08:30

## 2020-02-11 RX ADMIN — ACETAMINOPHEN 650 MG: 325 TABLET ORAL at 09:52

## 2020-02-11 RX ADMIN — ONDANSETRON 4 MG: 2 INJECTION INTRAMUSCULAR; INTRAVENOUS at 22:21

## 2020-02-11 RX ADMIN — QUETIAPINE FUMARATE 150 MG: 25 TABLET, FILM COATED ORAL at 22:20

## 2020-02-11 RX ADMIN — PANTOPRAZOLE SODIUM 40 MG: 40 TABLET, DELAYED RELEASE ORAL at 22:20

## 2020-02-11 RX ADMIN — DULOXETINE HYDROCHLORIDE 60 MG: 60 CAPSULE, DELAYED RELEASE ORAL at 08:30

## 2020-02-11 RX ADMIN — ATORVASTATIN CALCIUM 40 MG: 40 TABLET, FILM COATED ORAL at 08:30

## 2020-02-11 RX ADMIN — SODIUM CHLORIDE, PRESERVATIVE FREE 10 ML: 5 INJECTION INTRAVENOUS at 08:29

## 2020-02-11 RX ADMIN — ONDANSETRON 4 MG: 2 INJECTION INTRAMUSCULAR; INTRAVENOUS at 09:52

## 2020-02-11 RX ADMIN — HEPARIN SODIUM 5000 UNITS: 5000 INJECTION INTRAVENOUS; SUBCUTANEOUS at 22:19

## 2020-02-11 RX ADMIN — CLONAZEPAM 0.5 MG: 0.5 TABLET ORAL at 22:20

## 2020-02-11 RX ADMIN — Medication 1 CAPSULE: at 08:29

## 2020-02-11 RX ADMIN — SODIUM CHLORIDE 125 ML/HR: 9 INJECTION, SOLUTION INTRAVENOUS at 13:38

## 2020-02-11 RX ADMIN — ONDANSETRON 4 MG: 2 INJECTION INTRAMUSCULAR; INTRAVENOUS at 16:20

## 2020-02-11 RX ADMIN — SODIUM CHLORIDE 125 ML/HR: 9 INJECTION, SOLUTION INTRAVENOUS at 03:11

## 2020-02-11 RX ADMIN — PANTOPRAZOLE SODIUM 40 MG: 40 TABLET, DELAYED RELEASE ORAL at 08:30

## 2020-02-11 RX ADMIN — SODIUM CHLORIDE, PRESERVATIVE FREE 10 ML: 5 INJECTION INTRAVENOUS at 22:21

## 2020-02-11 RX ADMIN — NICOTINE 1 PATCH: 21 PATCH TRANSDERMAL at 08:31

## 2020-02-11 RX ADMIN — PREGABALIN 150 MG: 75 CAPSULE ORAL at 22:19

## 2020-02-11 RX ADMIN — SODIUM CHLORIDE 125 ML/HR: 9 INJECTION, SOLUTION INTRAVENOUS at 22:34

## 2020-02-11 RX ADMIN — CEFTRIAXONE 1 G: 1 INJECTION, POWDER, FOR SOLUTION INTRAMUSCULAR; INTRAVENOUS at 08:29

## 2020-02-11 RX ADMIN — ACETAMINOPHEN 650 MG: 325 TABLET ORAL at 15:14

## 2020-02-11 RX ADMIN — Medication 1 TABLET: at 22:20

## 2020-02-11 RX ADMIN — ACETAMINOPHEN 650 MG: 325 TABLET ORAL at 22:20

## 2020-02-11 RX ADMIN — CLONAZEPAM 0.5 MG: 0.5 TABLET ORAL at 09:52

## 2020-02-11 RX ADMIN — DULOXETINE HYDROCHLORIDE 60 MG: 60 CAPSULE, DELAYED RELEASE ORAL at 22:19

## 2020-02-11 RX ADMIN — ESTROGENS, CONJUGATED 0.6 MG: 0.3 TABLET, FILM COATED ORAL at 22:20

## 2020-02-12 ENCOUNTER — APPOINTMENT (OUTPATIENT)
Dept: GENERAL RADIOLOGY | Facility: HOSPITAL | Age: 50
End: 2020-02-12

## 2020-02-12 LAB
ANION GAP SERPL CALCULATED.3IONS-SCNC: 8.6 MMOL/L (ref 5–15)
BUN BLD-MCNC: 6 MG/DL (ref 6–20)
BUN/CREAT SERPL: 7.7 (ref 7–25)
CALCIUM SPEC-SCNC: 8.6 MG/DL (ref 8.6–10.5)
CHLORIDE SERPL-SCNC: 109 MMOL/L (ref 98–107)
CO2 SERPL-SCNC: 23.4 MMOL/L (ref 22–29)
CREAT BLD-MCNC: 0.78 MG/DL (ref 0.57–1)
GFR SERPL CREATININE-BSD FRML MDRD: 79 ML/MIN/1.73
GLUCOSE BLD-MCNC: 84 MG/DL (ref 65–99)
GLUCOSE BLDC GLUCOMTR-MCNC: 173 MG/DL (ref 70–130)
GLUCOSE BLDC GLUCOMTR-MCNC: 197 MG/DL (ref 70–130)
GLUCOSE BLDC GLUCOMTR-MCNC: 235 MG/DL (ref 70–130)
GLUCOSE BLDC GLUCOMTR-MCNC: 62 MG/DL (ref 70–130)
POTASSIUM BLD-SCNC: 3.4 MMOL/L (ref 3.5–5.2)
SODIUM BLD-SCNC: 141 MMOL/L (ref 136–145)

## 2020-02-12 PROCEDURE — 25010000002 HEPARIN (PORCINE) PER 1000 UNITS: Performed by: FAMILY MEDICINE

## 2020-02-12 PROCEDURE — 73501 X-RAY EXAM HIP UNI 1 VIEW: CPT

## 2020-02-12 PROCEDURE — 25010000002 METOCLOPRAMIDE PER 10 MG: Performed by: FAMILY MEDICINE

## 2020-02-12 PROCEDURE — G0108 DIAB MANAGE TRN  PER INDIV: HCPCS

## 2020-02-12 PROCEDURE — 82962 GLUCOSE BLOOD TEST: CPT

## 2020-02-12 PROCEDURE — 63710000001 INSULIN ASPART PER 5 UNITS: Performed by: NURSE PRACTITIONER

## 2020-02-12 PROCEDURE — 80048 BASIC METABOLIC PNL TOTAL CA: CPT | Performed by: FAMILY MEDICINE

## 2020-02-12 PROCEDURE — 73501 X-RAY EXAM HIP UNI 1 VIEW: CPT | Performed by: RADIOLOGY

## 2020-02-12 PROCEDURE — 25010000002 ONDANSETRON PER 1 MG: Performed by: FAMILY MEDICINE

## 2020-02-12 PROCEDURE — 99232 SBSQ HOSP IP/OBS MODERATE 35: CPT | Performed by: FAMILY MEDICINE

## 2020-02-12 PROCEDURE — 25010000002 CEFTRIAXONE: Performed by: FAMILY MEDICINE

## 2020-02-12 PROCEDURE — 94799 UNLISTED PULMONARY SVC/PX: CPT

## 2020-02-12 RX ORDER — METOCLOPRAMIDE HYDROCHLORIDE 5 MG/ML
5 INJECTION INTRAMUSCULAR; INTRAVENOUS EVERY 6 HOURS PRN
Status: DISCONTINUED | OUTPATIENT
Start: 2020-02-12 | End: 2020-02-13 | Stop reason: HOSPADM

## 2020-02-12 RX ORDER — DOXYCYCLINE 100 MG/1
100 CAPSULE ORAL EVERY 12 HOURS SCHEDULED
Status: DISCONTINUED | OUTPATIENT
Start: 2020-02-12 | End: 2020-02-13 | Stop reason: HOSPADM

## 2020-02-12 RX ADMIN — ESTROGENS, CONJUGATED 0.6 MG: 0.3 TABLET, FILM COATED ORAL at 20:41

## 2020-02-12 RX ADMIN — CLONAZEPAM 0.5 MG: 0.5 TABLET ORAL at 09:46

## 2020-02-12 RX ADMIN — Medication 400 UNITS: at 09:46

## 2020-02-12 RX ADMIN — PANTOPRAZOLE SODIUM 40 MG: 40 TABLET, DELAYED RELEASE ORAL at 20:41

## 2020-02-12 RX ADMIN — DOXYCYCLINE 100 MG: 100 CAPSULE ORAL at 13:41

## 2020-02-12 RX ADMIN — HEPARIN SODIUM 5000 UNITS: 5000 INJECTION INTRAVENOUS; SUBCUTANEOUS at 09:46

## 2020-02-12 RX ADMIN — DULOXETINE HYDROCHLORIDE 60 MG: 60 CAPSULE, DELAYED RELEASE ORAL at 09:46

## 2020-02-12 RX ADMIN — CEFTRIAXONE 1 G: 1 INJECTION, POWDER, FOR SOLUTION INTRAMUSCULAR; INTRAVENOUS at 09:48

## 2020-02-12 RX ADMIN — INSULIN ASPART 2 UNITS: 100 INJECTION, SOLUTION INTRAVENOUS; SUBCUTANEOUS at 20:43

## 2020-02-12 RX ADMIN — ONDANSETRON 4 MG: 2 INJECTION INTRAMUSCULAR; INTRAVENOUS at 09:57

## 2020-02-12 RX ADMIN — PANTOPRAZOLE SODIUM 40 MG: 40 TABLET, DELAYED RELEASE ORAL at 09:46

## 2020-02-12 RX ADMIN — DULOXETINE HYDROCHLORIDE 60 MG: 60 CAPSULE, DELAYED RELEASE ORAL at 20:41

## 2020-02-12 RX ADMIN — HEPARIN SODIUM 5000 UNITS: 5000 INJECTION INTRAVENOUS; SUBCUTANEOUS at 20:41

## 2020-02-12 RX ADMIN — PREGABALIN 150 MG: 75 CAPSULE ORAL at 20:41

## 2020-02-12 RX ADMIN — Medication 1 TABLET: at 20:41

## 2020-02-12 RX ADMIN — SODIUM CHLORIDE, PRESERVATIVE FREE 10 ML: 5 INJECTION INTRAVENOUS at 20:42

## 2020-02-12 RX ADMIN — INSULIN ASPART 3 UNITS: 100 INJECTION, SOLUTION INTRAVENOUS; SUBCUTANEOUS at 11:47

## 2020-02-12 RX ADMIN — Medication 1 CAPSULE: at 09:46

## 2020-02-12 RX ADMIN — DOXYCYCLINE 100 MG: 100 CAPSULE ORAL at 20:42

## 2020-02-12 RX ADMIN — ATORVASTATIN CALCIUM 40 MG: 40 TABLET, FILM COATED ORAL at 09:46

## 2020-02-12 RX ADMIN — QUETIAPINE FUMARATE 150 MG: 25 TABLET, FILM COATED ORAL at 20:40

## 2020-02-12 RX ADMIN — SODIUM CHLORIDE 125 ML/HR: 9 INJECTION, SOLUTION INTRAVENOUS at 08:03

## 2020-02-12 RX ADMIN — METOCLOPRAMIDE 5 MG: 5 INJECTION, SOLUTION INTRAMUSCULAR; INTRAVENOUS at 16:10

## 2020-02-12 RX ADMIN — PREGABALIN 150 MG: 75 CAPSULE ORAL at 09:46

## 2020-02-12 RX ADMIN — INSULIN ASPART 2 UNITS: 100 INJECTION, SOLUTION INTRAVENOUS; SUBCUTANEOUS at 17:55

## 2020-02-13 VITALS
BODY MASS INDEX: 51.91 KG/M2 | RESPIRATION RATE: 20 BRPM | TEMPERATURE: 98.2 F | OXYGEN SATURATION: 94 % | HEIGHT: 63 IN | WEIGHT: 293 LBS | SYSTOLIC BLOOD PRESSURE: 115 MMHG | DIASTOLIC BLOOD PRESSURE: 59 MMHG | HEART RATE: 104 BPM

## 2020-02-13 LAB
ANION GAP SERPL CALCULATED.3IONS-SCNC: 9.2 MMOL/L (ref 5–15)
BUN BLD-MCNC: 5 MG/DL (ref 6–20)
BUN/CREAT SERPL: 6.8 (ref 7–25)
CALCIUM SPEC-SCNC: 8.6 MG/DL (ref 8.6–10.5)
CHLORIDE SERPL-SCNC: 105 MMOL/L (ref 98–107)
CO2 SERPL-SCNC: 24.8 MMOL/L (ref 22–29)
CREAT BLD-MCNC: 0.74 MG/DL (ref 0.57–1)
DEPRECATED RDW RBC AUTO: 51.5 FL (ref 37–54)
EOSINOPHIL # BLD MANUAL: 0.28 10*3/MM3 (ref 0–0.4)
EOSINOPHIL NFR BLD MANUAL: 4 % (ref 0.3–6.2)
ERYTHROCYTE [DISTWIDTH] IN BLOOD BY AUTOMATED COUNT: 13.4 % (ref 12.3–15.4)
GFR SERPL CREATININE-BSD FRML MDRD: 84 ML/MIN/1.73
GLUCOSE BLD-MCNC: 89 MG/DL (ref 65–99)
GLUCOSE BLDC GLUCOMTR-MCNC: 111 MG/DL (ref 70–130)
GLUCOSE BLDC GLUCOMTR-MCNC: 132 MG/DL (ref 70–130)
GLUCOSE BLDC GLUCOMTR-MCNC: 46 MG/DL (ref 70–130)
HCT VFR BLD AUTO: 40.6 % (ref 34–46.6)
HGB BLD-MCNC: 12.9 G/DL (ref 12–15.9)
LYMPHOCYTES # BLD MANUAL: 3.96 10*3/MM3 (ref 0.7–3.1)
LYMPHOCYTES NFR BLD MANUAL: 3 % (ref 5–12)
LYMPHOCYTES NFR BLD MANUAL: 56 % (ref 19.6–45.3)
MCH RBC QN AUTO: 33 PG (ref 26.6–33)
MCHC RBC AUTO-ENTMCNC: 31.8 G/DL (ref 31.5–35.7)
MCV RBC AUTO: 103.8 FL (ref 79–97)
MONOCYTES # BLD AUTO: 0.21 10*3/MM3 (ref 0.1–0.9)
NEUTROPHILS # BLD AUTO: 2.62 10*3/MM3 (ref 1.7–7)
NEUTROPHILS NFR BLD MANUAL: 37 % (ref 42.7–76)
PLAT MORPH BLD: NORMAL
PLATELET # BLD AUTO: 81 10*3/MM3 (ref 140–450)
PMV BLD AUTO: 11.5 FL (ref 6–12)
POTASSIUM BLD-SCNC: 3.2 MMOL/L (ref 3.5–5.2)
RBC # BLD AUTO: 3.91 10*6/MM3 (ref 3.77–5.28)
RBC MORPH BLD: NORMAL
SCAN SLIDE: NORMAL
SODIUM BLD-SCNC: 139 MMOL/L (ref 136–145)
WBC NRBC COR # BLD: 7.07 10*3/MM3 (ref 3.4–10.8)

## 2020-02-13 PROCEDURE — 80048 BASIC METABOLIC PNL TOTAL CA: CPT | Performed by: FAMILY MEDICINE

## 2020-02-13 PROCEDURE — 25010000002 CEFTRIAXONE: Performed by: FAMILY MEDICINE

## 2020-02-13 PROCEDURE — 82962 GLUCOSE BLOOD TEST: CPT

## 2020-02-13 PROCEDURE — 85025 COMPLETE CBC W/AUTO DIFF WBC: CPT | Performed by: FAMILY MEDICINE

## 2020-02-13 PROCEDURE — 25010000002 ONDANSETRON PER 1 MG: Performed by: FAMILY MEDICINE

## 2020-02-13 PROCEDURE — 25010000002 HEPARIN (PORCINE) PER 1000 UNITS: Performed by: FAMILY MEDICINE

## 2020-02-13 PROCEDURE — 85007 BL SMEAR W/DIFF WBC COUNT: CPT | Performed by: FAMILY MEDICINE

## 2020-02-13 PROCEDURE — 99238 HOSP IP/OBS DSCHRG MGMT 30/<: CPT | Performed by: FAMILY MEDICINE

## 2020-02-13 RX ORDER — POTASSIUM CHLORIDE 750 MG/1
20 TABLET, FILM COATED, EXTENDED RELEASE ORAL DAILY
Qty: 6 TABLET | Refills: 0 | Status: SHIPPED | OUTPATIENT
Start: 2020-02-13 | End: 2020-02-16

## 2020-02-13 RX ORDER — POTASSIUM CHLORIDE 20 MEQ/1
40 TABLET, EXTENDED RELEASE ORAL ONCE
Status: COMPLETED | OUTPATIENT
Start: 2020-02-13 | End: 2020-02-13

## 2020-02-13 RX ORDER — DOXYCYCLINE 100 MG/1
100 CAPSULE ORAL EVERY 12 HOURS SCHEDULED
Qty: 11 CAPSULE | Refills: 0 | Status: SHIPPED | OUTPATIENT
Start: 2020-02-13 | End: 2020-02-19

## 2020-02-13 RX ORDER — METOPROLOL SUCCINATE 25 MG/1
25 TABLET, EXTENDED RELEASE ORAL DAILY
Qty: 30 TABLET | Refills: 0 | Status: SHIPPED | OUTPATIENT
Start: 2020-02-13 | End: 2020-03-14

## 2020-02-13 RX ADMIN — ONDANSETRON 4 MG: 2 INJECTION INTRAMUSCULAR; INTRAVENOUS at 08:32

## 2020-02-13 RX ADMIN — DULOXETINE HYDROCHLORIDE 60 MG: 60 CAPSULE, DELAYED RELEASE ORAL at 08:18

## 2020-02-13 RX ADMIN — DOXYCYCLINE 100 MG: 100 CAPSULE ORAL at 08:18

## 2020-02-13 RX ADMIN — SODIUM CHLORIDE, PRESERVATIVE FREE 10 ML: 5 INJECTION INTRAVENOUS at 08:19

## 2020-02-13 RX ADMIN — Medication 400 UNITS: at 08:18

## 2020-02-13 RX ADMIN — Medication 1 CAPSULE: at 08:18

## 2020-02-13 RX ADMIN — HEPARIN SODIUM 5000 UNITS: 5000 INJECTION INTRAVENOUS; SUBCUTANEOUS at 08:18

## 2020-02-13 RX ADMIN — PREGABALIN 150 MG: 75 CAPSULE ORAL at 08:18

## 2020-02-13 RX ADMIN — POTASSIUM CHLORIDE 40 MEQ: 1500 TABLET, EXTENDED RELEASE ORAL at 12:13

## 2020-02-13 RX ADMIN — ATORVASTATIN CALCIUM 40 MG: 40 TABLET, FILM COATED ORAL at 08:18

## 2020-02-13 RX ADMIN — PANTOPRAZOLE SODIUM 40 MG: 40 TABLET, DELAYED RELEASE ORAL at 08:18

## 2020-02-13 RX ADMIN — CEFTRIAXONE 1 G: 1 INJECTION, POWDER, FOR SOLUTION INTRAMUSCULAR; INTRAVENOUS at 08:18

## 2020-02-13 RX ADMIN — ACETAMINOPHEN 650 MG: 325 TABLET ORAL at 08:23

## 2020-02-15 LAB
BACTERIA SPEC AEROBE CULT: NORMAL
BACTERIA SPEC AEROBE CULT: NORMAL

## 2020-03-12 ENCOUNTER — HOSPITAL ENCOUNTER (OUTPATIENT)
Dept: GENERAL RADIOLOGY | Facility: HOSPITAL | Age: 50
Discharge: HOME | End: 2020-03-12
Admitting: PSYCHIATRY & NEUROLOGY

## 2020-03-12 ENCOUNTER — TRANSCRIBE ORDERS (OUTPATIENT)
Dept: ADMINISTRATIVE | Facility: HOSPITAL | Age: 50
End: 2020-03-12

## 2020-03-12 ENCOUNTER — HOSPITAL ENCOUNTER (OUTPATIENT)
Dept: GENERAL RADIOLOGY | Facility: HOSPITAL | Age: 50
Discharge: HOME | End: 2020-03-12

## 2020-03-12 DIAGNOSIS — M54.2 CERVICAL PAIN: ICD-10-CM

## 2020-03-12 DIAGNOSIS — M54.50 LUMBAR PAIN: Primary | ICD-10-CM

## 2020-03-12 DIAGNOSIS — M54.50 LUMBAR PAIN: ICD-10-CM

## 2020-03-12 PROCEDURE — 72110 X-RAY EXAM L-2 SPINE 4/>VWS: CPT | Performed by: RADIOLOGY

## 2020-03-12 PROCEDURE — 72050 X-RAY EXAM NECK SPINE 4/5VWS: CPT

## 2020-03-12 PROCEDURE — 72050 X-RAY EXAM NECK SPINE 4/5VWS: CPT | Performed by: RADIOLOGY

## 2020-03-12 PROCEDURE — 72110 X-RAY EXAM L-2 SPINE 4/>VWS: CPT

## 2020-03-17 ENCOUNTER — APPOINTMENT (OUTPATIENT)
Dept: GENERAL RADIOLOGY | Facility: HOSPITAL | Age: 50
End: 2020-03-17

## 2020-03-17 ENCOUNTER — APPOINTMENT (OUTPATIENT)
Dept: CT IMAGING | Facility: HOSPITAL | Age: 50
End: 2020-03-17

## 2020-03-17 ENCOUNTER — HOSPITAL ENCOUNTER (INPATIENT)
Facility: HOSPITAL | Age: 50
LOS: 3 days | Discharge: HOME | End: 2020-03-20
Attending: EMERGENCY MEDICINE | Admitting: INTERNAL MEDICINE

## 2020-03-17 DIAGNOSIS — J10.1 INFLUENZA A (H1N1): Primary | ICD-10-CM

## 2020-03-17 DIAGNOSIS — J18.9 PNEUMONIA OF BOTH LUNGS DUE TO INFECTIOUS ORGANISM, UNSPECIFIED PART OF LUNG: ICD-10-CM

## 2020-03-17 DIAGNOSIS — J96.01 ACUTE RESPIRATORY FAILURE WITH HYPOXIA (HCC): ICD-10-CM

## 2020-03-17 PROBLEM — J96.90 RESPIRATORY FAILURE (HCC): Status: ACTIVE | Noted: 2020-03-17

## 2020-03-17 LAB
A-A DO2: 39 MMHG (ref 0–300)
ALBUMIN SERPL-MCNC: 3.59 G/DL (ref 3.5–5.2)
ALBUMIN/GLOB SERPL: 1 G/DL
ALP SERPL-CCNC: 90 U/L (ref 39–117)
ALT SERPL W P-5'-P-CCNC: 28 U/L (ref 1–33)
ANION GAP SERPL CALCULATED.3IONS-SCNC: 13.1 MMOL/L (ref 5–15)
ARTERIAL PATENCY WRIST A: POSITIVE
AST SERPL-CCNC: 63 U/L (ref 1–32)
ATMOSPHERIC PRESS: 733 MMHG
B PERT DNA SPEC QL NAA+PROBE: NOT DETECTED
B-HCG UR QL: NEGATIVE
BACTERIA UR QL AUTO: ABNORMAL /HPF
BASE EXCESS BLDA CALC-SCNC: 2.2 MMOL/L (ref 0–2)
BASOPHILS # BLD AUTO: 0.02 10*3/MM3 (ref 0–0.2)
BASOPHILS NFR BLD AUTO: 0.4 % (ref 0–1.5)
BDY SITE: ABNORMAL
BILIRUB SERPL-MCNC: 0.7 MG/DL (ref 0.2–1.2)
BILIRUB UR QL STRIP: ABNORMAL
BODY TEMPERATURE: 0 C
BUN BLD-MCNC: 8 MG/DL (ref 6–20)
BUN/CREAT SERPL: 10.4 (ref 7–25)
C PNEUM DNA NPH QL NAA+NON-PROBE: NOT DETECTED
CALCIUM SPEC-SCNC: 8.5 MG/DL (ref 8.6–10.5)
CHLORIDE SERPL-SCNC: 100 MMOL/L (ref 98–107)
CLARITY UR: CLEAR
CO2 BLDA-SCNC: 29.3 MMOL/L (ref 22–33)
CO2 SERPL-SCNC: 24.9 MMOL/L (ref 22–29)
COHGB MFR BLD: 2.9 % (ref 0–5)
COLOR UR: ABNORMAL
CREAT BLD-MCNC: 0.77 MG/DL (ref 0.57–1)
CRP SERPL-MCNC: 2.46 MG/DL (ref 0–0.5)
D-LACTATE SERPL-SCNC: 2.2 MMOL/L (ref 0.5–2)
D-LACTATE SERPL-SCNC: 2.4 MMOL/L (ref 0.5–2)
DEPRECATED RDW RBC AUTO: 49.5 FL (ref 37–54)
EOSINOPHIL # BLD AUTO: 0.01 10*3/MM3 (ref 0–0.4)
EOSINOPHIL NFR BLD AUTO: 0.2 % (ref 0.3–6.2)
ERYTHROCYTE [DISTWIDTH] IN BLOOD BY AUTOMATED COUNT: 13.3 % (ref 12.3–15.4)
FERRITIN SERPL-MCNC: 146.3 NG/ML (ref 13–150)
FLUAV H1 2009 PAND RNA NPH QL NAA+PROBE: DETECTED
FLUAV H1 HA GENE NPH QL NAA+PROBE: NOT DETECTED
FLUAV H3 RNA NPH QL NAA+PROBE: NOT DETECTED
FLUAV SUBTYP SPEC NAA+PROBE: NOT DETECTED
FLUBV RNA ISLT QL NAA+PROBE: NOT DETECTED
GFR SERPL CREATININE-BSD FRML MDRD: 80 ML/MIN/1.73
GLOBULIN UR ELPH-MCNC: 3.5 GM/DL
GLUCOSE BLD-MCNC: 167 MG/DL (ref 65–99)
GLUCOSE BLDC GLUCOMTR-MCNC: 134 MG/DL (ref 70–130)
GLUCOSE BLDC GLUCOMTR-MCNC: 88 MG/DL (ref 70–130)
GLUCOSE UR STRIP-MCNC: NEGATIVE MG/DL
HADV DNA SPEC NAA+PROBE: NOT DETECTED
HCO3 BLDA-SCNC: 27.9 MMOL/L (ref 20–26)
HCOV 229E RNA SPEC QL NAA+PROBE: NOT DETECTED
HCOV HKU1 RNA SPEC QL NAA+PROBE: NOT DETECTED
HCOV NL63 RNA SPEC QL NAA+PROBE: NOT DETECTED
HCOV OC43 RNA SPEC QL NAA+PROBE: NOT DETECTED
HCT VFR BLD AUTO: 42.5 % (ref 34–46.6)
HCT VFR BLD CALC: 42.6 % (ref 38–51)
HGB BLD-MCNC: 13.8 G/DL (ref 12–15.9)
HGB BLDA-MCNC: 13.9 G/DL (ref 13.5–17.5)
HGB UR QL STRIP.AUTO: ABNORMAL
HMPV RNA NPH QL NAA+NON-PROBE: NOT DETECTED
HOROWITZ INDEX BLD+IHG-RTO: 21 %
HPIV1 RNA SPEC QL NAA+PROBE: NOT DETECTED
HPIV2 RNA SPEC QL NAA+PROBE: NOT DETECTED
HPIV3 RNA NPH QL NAA+PROBE: NOT DETECTED
HPIV4 P GENE NPH QL NAA+PROBE: NOT DETECTED
HYALINE CASTS UR QL AUTO: ABNORMAL /LPF
IMM GRANULOCYTES # BLD AUTO: 0.01 10*3/MM3 (ref 0–0.05)
IMM GRANULOCYTES NFR BLD AUTO: 0.2 % (ref 0–0.5)
KETONES UR QL STRIP: ABNORMAL
L PNEUMO1 AG UR QL IA: NEGATIVE
LACTATE HOLD SPECIMEN: NORMAL
LEUKOCYTE ESTERASE UR QL STRIP.AUTO: ABNORMAL
LYMPHOCYTES # BLD AUTO: 0.49 10*3/MM3 (ref 0.7–3.1)
LYMPHOCYTES NFR BLD AUTO: 10.1 % (ref 19.6–45.3)
Lab: ABNORMAL
Lab: ABNORMAL
M PNEUMO IGG SER IA-ACNC: NOT DETECTED
MCH RBC QN AUTO: 32.7 PG (ref 26.6–33)
MCHC RBC AUTO-ENTMCNC: 32.5 G/DL (ref 31.5–35.7)
MCV RBC AUTO: 100.7 FL (ref 79–97)
METHGB BLD QL: 0.2 % (ref 0–3)
MODALITY: ABNORMAL
MONOCYTES # BLD AUTO: 0.72 10*3/MM3 (ref 0.1–0.9)
MONOCYTES NFR BLD AUTO: 14.9 % (ref 5–12)
MRSA DNA SPEC QL NAA+PROBE: NEGATIVE
NEUTROPHILS # BLD AUTO: 3.58 10*3/MM3 (ref 1.7–7)
NEUTROPHILS NFR BLD AUTO: 74.2 % (ref 42.7–76)
NITRITE UR QL STRIP: NEGATIVE
NOTE: ABNORMAL
NOTIFIED BY: ABNORMAL
NOTIFIED WHO: ABNORMAL
NRBC BLD AUTO-RTO: 0 /100 WBC (ref 0–0.2)
NT-PROBNP SERPL-MCNC: 203.1 PG/ML (ref 5–450)
OXYHGB MFR BLDV: 85.1 % (ref 94–99)
PCO2 BLDA: 46.7 MM HG (ref 35–45)
PCO2 TEMP ADJ BLD: ABNORMAL MM[HG]
PH BLDA: 7.38 PH UNITS (ref 7.35–7.45)
PH UR STRIP.AUTO: 8 [PH] (ref 5–8)
PH, TEMP CORRECTED: ABNORMAL
PLATELET # BLD AUTO: 79 10*3/MM3 (ref 140–450)
PMV BLD AUTO: 11.6 FL (ref 6–12)
PO2 BLDA: 52.4 MM HG (ref 83–108)
PO2 TEMP ADJ BLD: ABNORMAL MM[HG]
POTASSIUM BLD-SCNC: 4.1 MMOL/L (ref 3.5–5.2)
PROT SERPL-MCNC: 7.1 G/DL (ref 6–8.5)
PROT UR QL STRIP: ABNORMAL
RBC # BLD AUTO: 4.22 10*6/MM3 (ref 3.77–5.28)
RBC # UR: ABNORMAL /HPF
REF LAB TEST METHOD: ABNORMAL
RHINOVIRUS RNA SPEC NAA+PROBE: NOT DETECTED
RSV RNA NPH QL NAA+NON-PROBE: NOT DETECTED
S AUREUS DNA SPEC QL NAA+PROBE: NEGATIVE
SAO2 % BLDCOA: 87.8 % (ref 94–99)
SODIUM BLD-SCNC: 138 MMOL/L (ref 136–145)
SP GR UR STRIP: >=1.03 (ref 1–1.03)
SQUAMOUS #/AREA URNS HPF: ABNORMAL /HPF
TROPONIN T SERPL-MCNC: <0.01 NG/ML (ref 0–0.03)
UROBILINOGEN UR QL STRIP: ABNORMAL
VENTILATOR MODE: ABNORMAL
WBC NRBC COR # BLD: 4.83 10*3/MM3 (ref 3.4–10.8)
WBC UR QL AUTO: ABNORMAL /HPF

## 2020-03-17 PROCEDURE — 93010 ELECTROCARDIOGRAM REPORT: CPT | Performed by: INTERNAL MEDICINE

## 2020-03-17 PROCEDURE — 83050 HGB METHEMOGLOBIN QUAN: CPT

## 2020-03-17 PROCEDURE — 93005 ELECTROCARDIOGRAM TRACING: CPT | Performed by: INTERNAL MEDICINE

## 2020-03-17 PROCEDURE — 71045 X-RAY EXAM CHEST 1 VIEW: CPT

## 2020-03-17 PROCEDURE — 99223 1ST HOSP IP/OBS HIGH 75: CPT | Performed by: PHYSICIAN ASSISTANT

## 2020-03-17 PROCEDURE — 93005 ELECTROCARDIOGRAM TRACING: CPT | Performed by: PHYSICIAN ASSISTANT

## 2020-03-17 PROCEDURE — 87040 BLOOD CULTURE FOR BACTERIA: CPT | Performed by: PHYSICIAN ASSISTANT

## 2020-03-17 PROCEDURE — 94799 UNLISTED PULMONARY SVC/PX: CPT

## 2020-03-17 PROCEDURE — 83880 ASSAY OF NATRIURETIC PEPTIDE: CPT | Performed by: PHYSICIAN ASSISTANT

## 2020-03-17 PROCEDURE — 25010000002 METHYLPREDNISOLONE PER 40 MG: Performed by: INTERNAL MEDICINE

## 2020-03-17 PROCEDURE — 84145 PROCALCITONIN (PCT): CPT | Performed by: PHYSICIAN ASSISTANT

## 2020-03-17 PROCEDURE — 87641 MR-STAPH DNA AMP PROBE: CPT | Performed by: INTERNAL MEDICINE

## 2020-03-17 PROCEDURE — 85025 COMPLETE CBC W/AUTO DIFF WBC: CPT | Performed by: PHYSICIAN ASSISTANT

## 2020-03-17 PROCEDURE — 84484 ASSAY OF TROPONIN QUANT: CPT | Performed by: INTERNAL MEDICINE

## 2020-03-17 PROCEDURE — 25010000002 CEFTRIAXONE: Performed by: PHYSICIAN ASSISTANT

## 2020-03-17 PROCEDURE — 82962 GLUCOSE BLOOD TEST: CPT

## 2020-03-17 PROCEDURE — 82728 ASSAY OF FERRITIN: CPT | Performed by: PHYSICIAN ASSISTANT

## 2020-03-17 PROCEDURE — 36600 WITHDRAWAL OF ARTERIAL BLOOD: CPT

## 2020-03-17 PROCEDURE — 25010000002 PROMETHAZINE PER 50 MG: Performed by: HOSPITALIST

## 2020-03-17 PROCEDURE — 71045 X-RAY EXAM CHEST 1 VIEW: CPT | Performed by: RADIOLOGY

## 2020-03-17 PROCEDURE — 70450 CT HEAD/BRAIN W/O DYE: CPT

## 2020-03-17 PROCEDURE — 84484 ASSAY OF TROPONIN QUANT: CPT | Performed by: PHYSICIAN ASSISTANT

## 2020-03-17 PROCEDURE — 99285 EMERGENCY DEPT VISIT HI MDM: CPT

## 2020-03-17 PROCEDURE — 81025 URINE PREGNANCY TEST: CPT | Performed by: INTERNAL MEDICINE

## 2020-03-17 PROCEDURE — 36415 COLL VENOUS BLD VENIPUNCTURE: CPT

## 2020-03-17 PROCEDURE — 25010000003 MEPERIDINE PER 100 MG: Performed by: INTERNAL MEDICINE

## 2020-03-17 PROCEDURE — 74176 CT ABD & PELVIS W/O CONTRAST: CPT

## 2020-03-17 PROCEDURE — 80053 COMPREHEN METABOLIC PANEL: CPT | Performed by: PHYSICIAN ASSISTANT

## 2020-03-17 PROCEDURE — 82805 BLOOD GASES W/O2 SATURATION: CPT

## 2020-03-17 PROCEDURE — 70450 CT HEAD/BRAIN W/O DYE: CPT | Performed by: RADIOLOGY

## 2020-03-17 PROCEDURE — 74176 CT ABD & PELVIS W/O CONTRAST: CPT | Performed by: RADIOLOGY

## 2020-03-17 PROCEDURE — 25010000002 VANCOMYCIN 5 G RECONSTITUTED SOLUTION 5,000 MG VIAL: Performed by: INTERNAL MEDICINE

## 2020-03-17 PROCEDURE — 71260 CT THORAX DX C+: CPT

## 2020-03-17 PROCEDURE — 94640 AIRWAY INHALATION TREATMENT: CPT

## 2020-03-17 PROCEDURE — 71260 CT THORAX DX C+: CPT | Performed by: RADIOLOGY

## 2020-03-17 PROCEDURE — 87899 AGENT NOS ASSAY W/OPTIC: CPT | Performed by: PHYSICIAN ASSISTANT

## 2020-03-17 PROCEDURE — 83605 ASSAY OF LACTIC ACID: CPT | Performed by: PHYSICIAN ASSISTANT

## 2020-03-17 PROCEDURE — 87640 STAPH A DNA AMP PROBE: CPT | Performed by: INTERNAL MEDICINE

## 2020-03-17 PROCEDURE — 82375 ASSAY CARBOXYHB QUANT: CPT

## 2020-03-17 PROCEDURE — 81001 URINALYSIS AUTO W/SCOPE: CPT | Performed by: PHYSICIAN ASSISTANT

## 2020-03-17 PROCEDURE — 0099U HC BIOFIRE FILMARRAY RESP PANEL 1: CPT | Performed by: PHYSICIAN ASSISTANT

## 2020-03-17 PROCEDURE — 86140 C-REACTIVE PROTEIN: CPT | Performed by: PHYSICIAN ASSISTANT

## 2020-03-17 PROCEDURE — 25010000002 KETOROLAC TROMETHAMINE PER 15 MG: Performed by: PHYSICIAN ASSISTANT

## 2020-03-17 PROCEDURE — 0 IOVERSOL 68 % SOLUTION: Performed by: EMERGENCY MEDICINE

## 2020-03-17 RX ORDER — ACETAMINOPHEN 325 MG/1
650 TABLET ORAL ONCE
Status: COMPLETED | OUTPATIENT
Start: 2020-03-17 | End: 2020-03-17

## 2020-03-17 RX ORDER — PROMETHAZINE HYDROCHLORIDE 12.5 MG/1
6.25 TABLET ORAL EVERY 6 HOURS PRN
Status: DISCONTINUED | OUTPATIENT
Start: 2020-03-17 | End: 2020-03-20 | Stop reason: HOSPADM

## 2020-03-17 RX ORDER — SODIUM CHLORIDE 0.9 % (FLUSH) 0.9 %
10 SYRINGE (ML) INJECTION AS NEEDED
Status: DISCONTINUED | OUTPATIENT
Start: 2020-03-17 | End: 2020-03-20 | Stop reason: HOSPADM

## 2020-03-17 RX ORDER — ACETAMINOPHEN 325 MG/1
650 TABLET ORAL EVERY 4 HOURS PRN
Status: DISCONTINUED | OUTPATIENT
Start: 2020-03-17 | End: 2020-03-20 | Stop reason: HOSPADM

## 2020-03-17 RX ORDER — KETOROLAC TROMETHAMINE 30 MG/ML
15 INJECTION, SOLUTION INTRAMUSCULAR; INTRAVENOUS ONCE
Status: COMPLETED | OUTPATIENT
Start: 2020-03-17 | End: 2020-03-17

## 2020-03-17 RX ORDER — BENZONATATE 100 MG/1
200 CAPSULE ORAL 3 TIMES DAILY PRN
Status: DISCONTINUED | OUTPATIENT
Start: 2020-03-17 | End: 2020-03-20 | Stop reason: HOSPADM

## 2020-03-17 RX ORDER — NITROGLYCERIN 0.4 MG/1
0.4 TABLET SUBLINGUAL
Status: DISCONTINUED | OUTPATIENT
Start: 2020-03-17 | End: 2020-03-20 | Stop reason: HOSPADM

## 2020-03-17 RX ORDER — L.ACID,PARA/B.BIFIDUM/S.THERM 8B CELL
1 CAPSULE ORAL DAILY
Status: DISCONTINUED | OUTPATIENT
Start: 2020-03-17 | End: 2020-03-18 | Stop reason: SDUPTHER

## 2020-03-17 RX ORDER — GUAIFENESIN/DEXTROMETHORPHAN 100-10MG/5
5 SYRUP ORAL EVERY 4 HOURS PRN
Status: DISCONTINUED | OUTPATIENT
Start: 2020-03-17 | End: 2020-03-20 | Stop reason: HOSPADM

## 2020-03-17 RX ORDER — SODIUM CHLORIDE 0.9 % (FLUSH) 0.9 %
10 SYRINGE (ML) INJECTION EVERY 12 HOURS SCHEDULED
Status: DISCONTINUED | OUTPATIENT
Start: 2020-03-17 | End: 2020-03-20 | Stop reason: HOSPADM

## 2020-03-17 RX ORDER — ACETAMINOPHEN 650 MG/1
650 SUPPOSITORY RECTAL EVERY 4 HOURS PRN
Status: DISCONTINUED | OUTPATIENT
Start: 2020-03-17 | End: 2020-03-20 | Stop reason: HOSPADM

## 2020-03-17 RX ORDER — IPRATROPIUM BROMIDE AND ALBUTEROL SULFATE 2.5; .5 MG/3ML; MG/3ML
3 SOLUTION RESPIRATORY (INHALATION)
Status: DISCONTINUED | OUTPATIENT
Start: 2020-03-17 | End: 2020-03-18

## 2020-03-17 RX ORDER — METOPROLOL SUCCINATE 50 MG/1
50 TABLET, EXTENDED RELEASE ORAL 2 TIMES DAILY
COMMUNITY

## 2020-03-17 RX ORDER — DEXTROSE MONOHYDRATE 25 G/50ML
25 INJECTION, SOLUTION INTRAVENOUS
Status: DISCONTINUED | OUTPATIENT
Start: 2020-03-17 | End: 2020-03-20 | Stop reason: HOSPADM

## 2020-03-17 RX ORDER — ACETAMINOPHEN 160 MG/5ML
650 SOLUTION ORAL EVERY 4 HOURS PRN
Status: DISCONTINUED | OUTPATIENT
Start: 2020-03-17 | End: 2020-03-20 | Stop reason: HOSPADM

## 2020-03-17 RX ORDER — IPRATROPIUM BROMIDE AND ALBUTEROL SULFATE 2.5; .5 MG/3ML; MG/3ML
3 SOLUTION RESPIRATORY (INHALATION) ONCE
Status: COMPLETED | OUTPATIENT
Start: 2020-03-17 | End: 2020-03-17

## 2020-03-17 RX ORDER — QUETIAPINE FUMARATE 200 MG/1
200 TABLET, FILM COATED ORAL NIGHTLY
COMMUNITY
End: 2021-08-20

## 2020-03-17 RX ORDER — GUAIFENESIN/DEXTROMETHORPHAN 100-10MG/5
5 SYRUP ORAL ONCE
Status: COMPLETED | OUTPATIENT
Start: 2020-03-17 | End: 2020-03-17

## 2020-03-17 RX ORDER — OSELTAMIVIR PHOSPHATE 75 MG/1
75 CAPSULE ORAL ONCE
Status: COMPLETED | OUTPATIENT
Start: 2020-03-17 | End: 2020-03-17

## 2020-03-17 RX ORDER — ACETAMINOPHEN 500 MG
1000 TABLET ORAL ONCE
Status: COMPLETED | OUTPATIENT
Start: 2020-03-17 | End: 2020-03-17

## 2020-03-17 RX ORDER — METHYLPREDNISOLONE SODIUM SUCCINATE 40 MG/ML
40 INJECTION, POWDER, LYOPHILIZED, FOR SOLUTION INTRAMUSCULAR; INTRAVENOUS EVERY 12 HOURS
Status: DISCONTINUED | OUTPATIENT
Start: 2020-03-17 | End: 2020-03-19

## 2020-03-17 RX ORDER — OSELTAMIVIR PHOSPHATE 75 MG/1
75 CAPSULE ORAL EVERY 12 HOURS SCHEDULED
Status: DISCONTINUED | OUTPATIENT
Start: 2020-03-17 | End: 2020-03-20 | Stop reason: HOSPADM

## 2020-03-17 RX ORDER — NICOTINE POLACRILEX 4 MG
15 LOZENGE BUCCAL
Status: DISCONTINUED | OUTPATIENT
Start: 2020-03-17 | End: 2020-03-20 | Stop reason: HOSPADM

## 2020-03-17 RX ORDER — MEPERIDINE HYDROCHLORIDE 25 MG/ML
25 INJECTION INTRAMUSCULAR; INTRAVENOUS; SUBCUTANEOUS EVERY 4 HOURS PRN
Status: DISCONTINUED | OUTPATIENT
Start: 2020-03-17 | End: 2020-03-18

## 2020-03-17 RX ORDER — HEPARIN SODIUM 5000 [USP'U]/ML
5000 INJECTION, SOLUTION INTRAVENOUS; SUBCUTANEOUS EVERY 12 HOURS SCHEDULED
Status: DISCONTINUED | OUTPATIENT
Start: 2020-03-17 | End: 2020-03-17

## 2020-03-17 RX ADMIN — MEPERIDINE HYDROCHLORIDE 25 MG: 25 INJECTION INTRAMUSCULAR; INTRAVENOUS; SUBCUTANEOUS at 17:24

## 2020-03-17 RX ADMIN — ACETAMINOPHEN 650 MG: 325 TABLET ORAL at 14:19

## 2020-03-17 RX ADMIN — OSELTAMIVIR PHOSPHATE 75 MG: 75 CAPSULE ORAL at 21:10

## 2020-03-17 RX ADMIN — DEXTROMETHORPHAN HYDROBROMIDE AND GUAIFENESIN 5 ML: 10; 100 LIQUID ORAL at 12:19

## 2020-03-17 RX ADMIN — DEXTROMETHORPHAN HYDROBROMIDE AND GUAIFENESIN 5 ML: 10; 100 LIQUID ORAL at 17:23

## 2020-03-17 RX ADMIN — Medication 1 CAPSULE: at 17:23

## 2020-03-17 RX ADMIN — IPRATROPIUM BROMIDE AND ALBUTEROL SULFATE 3 ML: .5; 3 SOLUTION RESPIRATORY (INHALATION) at 09:09

## 2020-03-17 RX ADMIN — METHYLPREDNISOLONE SODIUM SUCCINATE 40 MG: 40 INJECTION, POWDER, FOR SOLUTION INTRAMUSCULAR; INTRAVENOUS at 17:23

## 2020-03-17 RX ADMIN — CEFTRIAXONE 2 G: 2 INJECTION, POWDER, FOR SOLUTION INTRAMUSCULAR; INTRAVENOUS at 10:42

## 2020-03-17 RX ADMIN — VANCOMYCIN HYDROCHLORIDE 1750 MG: 5 INJECTION, POWDER, LYOPHILIZED, FOR SOLUTION INTRAVENOUS at 17:24

## 2020-03-17 RX ADMIN — SODIUM CHLORIDE, PRESERVATIVE FREE 10 ML: 5 INJECTION INTRAVENOUS at 21:10

## 2020-03-17 RX ADMIN — DOXYCYCLINE 100 MG: 100 INJECTION, POWDER, LYOPHILIZED, FOR SOLUTION INTRAVENOUS at 11:06

## 2020-03-17 RX ADMIN — ACETAMINOPHEN 1000 MG: 500 TABLET ORAL at 09:05

## 2020-03-17 RX ADMIN — IOVERSOL 100 ML: 678 INJECTION INTRA-ARTERIAL; INTRAVENOUS at 11:43

## 2020-03-17 RX ADMIN — ACETAMINOPHEN 650 MG: 325 TABLET ORAL at 21:10

## 2020-03-17 RX ADMIN — PROMETHAZINE HYDROCHLORIDE 6.25 MG: 25 INJECTION INTRAMUSCULAR; INTRAVENOUS at 21:09

## 2020-03-17 RX ADMIN — KETOROLAC TROMETHAMINE 15 MG: 30 INJECTION, SOLUTION INTRAMUSCULAR at 10:22

## 2020-03-17 RX ADMIN — BENZONATATE 200 MG: 100 CAPSULE ORAL at 17:23

## 2020-03-17 RX ADMIN — IPRATROPIUM BROMIDE AND ALBUTEROL SULFATE 3 ML: .5; 3 SOLUTION RESPIRATORY (INHALATION) at 18:35

## 2020-03-17 RX ADMIN — OSELTAMIVIR PHOSPHATE 75 MG: 75 CAPSULE ORAL at 11:06

## 2020-03-17 NOTE — PLAN OF CARE
Problem: Fall Risk (Adult)  Goal: Identify Related Risk Factors and Signs and Symptoms  Outcome: Ongoing (interventions implemented as appropriate)  Flowsheets (Taken 3/17/2020 1558)  Related Risk Factors (Fall Risk): environment unfamiliar  Signs and Symptoms (Fall Risk): presence of risk factors  Goal: Absence of Fall  Outcome: Ongoing (interventions implemented as appropriate)  Flowsheets (Taken 3/17/2020 1558)  Absence of Fall: making progress toward outcome     Problem: Patient Care Overview  Goal: Plan of Care Review  Outcome: Ongoing (interventions implemented as appropriate)  Flowsheets (Taken 3/17/2020 1558)  Progress: improving  Plan of Care Reviewed With: patient  Outcome Summary: VSS. NSR. 2L nc. Arrived to floor from ED. Placed in droplet precautions for flu. CT head ordered for HA. Prn meds ordered for cough and HA. IV abx and steroids started. Will continue to monitor.  Goal: Individualization and Mutuality  Outcome: Ongoing (interventions implemented as appropriate)  Goal: Discharge Needs Assessment  Outcome: Ongoing (interventions implemented as appropriate)  Goal: Interprofessional Rounds/Family Conf  Outcome: Ongoing (interventions implemented as appropriate)     Problem: Pneumonia (Adult)  Goal: Signs and Symptoms of Listed Potential Problems Will be Absent, Minimized or Managed (Pneumonia)  Outcome: Ongoing (interventions implemented as appropriate)  Flowsheets (Taken 3/17/2020 1558)  Problems Assessed (Pneumonia): all  Problems Present (Pneumonia): infection progression;respiratory compromise     Problem: Breathing Pattern Ineffective (Adult)  Goal: Identify Related Risk Factors and Signs and Symptoms  Outcome: Ongoing (interventions implemented as appropriate)  Flowsheets (Taken 3/17/2020 1558)  Related Risk Factors (Breathing Pattern Ineffective): fatigue;infection;obesity;smoking;underlying condition  Signs and Symptoms (Breathing Pattern Ineffective): accessory muscle use;breath sounds  abnormal;breathing pattern altered;activity intolerance;breathlessness  Goal: Effective Oxygenation/Ventilation  Outcome: Ongoing (interventions implemented as appropriate)  Flowsheets (Taken 3/17/2020 1752)  Effective Oxygenation/Ventilation: making progress toward outcome  Goal: Anxiety/Fear Reduction  Outcome: Ongoing (interventions implemented as appropriate)  Flowsheets (Taken 3/17/2020 4468)  Anxiety/Fear Reduction: making progress toward outcome

## 2020-03-17 NOTE — ED NOTES
Pt signed consent with contrast at this time, verbalized understand the procedure.      Chioma Dan, RN  03/17/20 9239

## 2020-03-17 NOTE — PROGRESS NOTES
Assisted By: Kelley PATRICK    CC: Evaluate cough congestion and hypoxia    Interview History/HPI: This note is in conjunction with history and physical done by Kenyetta WAITE.  Patient States that this illness began fairly sudden onset yesterday.  She has had dry cough congestion headache fever to about 100 and headache.  Tylenol has not helped the headache, she would like something else for this, she is allergic to morphine however this causes nausea and hives.  She has had Toradol before but does not remember any other narcotic analgesia.  She has been short of breath, she was found to have pneumonia and influenza in the emergency room.  She was admitted for further evaluation treatment, she did have the Pneumovax as well as the flu vaccine this year.       Vitals:    03/17/20 1537   BP: 151/96   Pulse: 98   Resp: 23   Temp: 99.5 °F (37.5 °C)   SpO2: 94%         Intake/Output Summary (Last 24 hours) at 3/17/2020 1611  Last data filed at 3/17/2020 1210  Gross per 24 hour   Intake 200 ml   Output --   Net 200 ml       EXAM: She has a dry nagging cough, face redness when she coughs, morbidly obese by BMI, in no distress her saturation is 94 to 95% on 2 L nasal cannula.  Neck is supple pupils equal speech normal strength symmetric lungs have bilateral breath sounds diminished sounds throughout with some end expiratory wheezing heard no definite crackles heart regular rate and rhythm without murmur rub or gallop, abdomen soft benign bowel sounds are active extremities are without significant edema, possibly trace      EKG: Nonspecific findings in the inferior leads predominantly lead III compared to old EKG        LABS:   Lab Results (last 48 hours)     Procedure Component Value Units Date/Time    C-reactive Protein [[id number]]  (Abnormal) Collected:  03/17/20 0923    Specimen:  Blood from Arm, Left Updated:  03/17/20 1606     C-Reactive Protein 2.46 mg/dL     Procalcitonin [[id number]] Collected:  03/17/20 1559    Specimen:   Blood Updated:  03/17/20 1603    Lactic Acid, Reflex [[id number]]  (Abnormal) Collected:  03/17/20 1331    Specimen:  Blood from Arm, Right Updated:  03/17/20 1405     Lactate 2.2 mmol/L     Ferritin [[id number]]  (Normal) Collected:  03/17/20 0923    Specimen:  Blood from Arm, Left Updated:  03/17/20 1402     Ferritin 146.30 ng/mL     Narrative:       Results may be falsely decreased if patient taking Biotin.      Lactic Acid, Reflex Timer (This will reflex a repeat order 3-3:15 hours after ordered.) [[id number]] Collected:  03/17/20 0923    Specimen:  Blood from Arm, Left Updated:  03/17/20 1301     Hold Tube Hold for add-ons.     Comment: Auto resulted.       Respiratory Panel, PCR - Swab, Nasopharynx [[id number]]  (Abnormal) Collected:  03/17/20 0921    Specimen:  Swab from Nasopharynx Updated:  03/17/20 1050     ADENOVIRUS, PCR Not Detected     Coronavirus 229E Not Detected     Coronavirus HKU1 Not Detected     Coronavirus NL63 Not Detected     Coronavirus OC43 Not Detected     Human Metapneumovirus Not Detected     Human Rhinovirus/Enterovirus Not Detected     Influenza B PCR Not Detected     Parainfluenza Virus 1 Not Detected     Parainfluenza Virus 2 Not Detected     Parainfluenza Virus 3 Not Detected     Parainfluenza Virus 4 Not Detected     Bordetella pertussis pcr Not Detected     Influenza A H1 2009 PCR Detected     Chlamydophila pneumoniae PCR Not Detected     Mycoplasma pneumo by PCR Not Detected     Influenza A PCR Not Detected     Influenza A H3 Not Detected     Influenza A H1 Not Detected     RSV, PCR Not Detected    Narrative:       The coronavirus on the RVP is NOT COVID-19 and is NOT indicative of infection with COVID-19.     Urinalysis With Microscopic If Indicated (No Culture) - Urine, Clean Catch [[id number]]  (Abnormal) Collected:  03/17/20 0949    Specimen:  Urine, Clean Catch Updated:  03/17/20 1008     Color, UA Dark Yellow     Appearance, UA Clear     pH, UA 8.0     Specific Ulen, UA  >=1.030     Glucose, UA Negative     Ketones, UA Trace     Bilirubin, UA Small (1+)     Blood, UA Large (3+)     Protein, UA Trace     Leuk Esterase, UA Trace     Nitrite, UA Negative     Urobilinogen, UA 4.0 E.U./dL    Urinalysis, Microscopic Only - Urine, Clean Catch [[id number]]  (Abnormal) Collected:  03/17/20 0949    Specimen:  Urine, Clean Catch Updated:  03/17/20 1008     RBC, UA Too Numerous to Count /HPF      WBC, UA 0-2 /HPF      Bacteria, UA None Seen /HPF      Squamous Epithelial Cells, UA 3-6 /HPF      Hyaline Casts, UA 3-6 /LPF      Methodology Automated Microscopy    Lactic Acid, Plasma [[id number]]  (Abnormal) Collected:  03/17/20 0923    Specimen:  Blood from Arm, Left Updated:  03/17/20 1000     Lactate 2.4 mmol/L     Blood Culture - Blood, Arm, Right [[id number]] Collected:  03/17/20 0949    Specimen:  Blood from Arm, Right Updated:  03/17/20 0958    Comprehensive Metabolic Panel [[id number]]  (Abnormal) Collected:  03/17/20 0923    Specimen:  Blood from Arm, Left Updated:  03/17/20 0957     Glucose 167 mg/dL      BUN 8 mg/dL      Creatinine 0.77 mg/dL      Sodium 138 mmol/L      Potassium 4.1 mmol/L      Chloride 100 mmol/L      CO2 24.9 mmol/L      Calcium 8.5 mg/dL      Total Protein 7.1 g/dL      Albumin 3.59 g/dL      ALT (SGPT) 28 U/L      AST (SGOT) 63 U/L      Alkaline Phosphatase 90 U/L      Total Bilirubin 0.7 mg/dL      eGFR Non African Amer 80 mL/min/1.73      Globulin 3.5 gm/dL      A/G Ratio 1.0 g/dL      BUN/Creatinine Ratio 10.4     Anion Gap 13.1 mmol/L     Narrative:       GFR Normal >60  Chronic Kidney Disease <60  Kidney Failure <15      Troponin [[id number]]  (Normal) Collected:  03/17/20 0923    Specimen:  Blood from Arm, Left Updated:  03/17/20 0957     Troponin T <0.010 ng/mL     Narrative:       Troponin T Reference Range:  <= 0.03 ng/mL-   Negative for AMI  >0.03 ng/mL-     Abnormal for myocardial necrosis.  Clinicians would have to utilize clinical acumen, EKG, Troponin and  serial changes to determine if it is an Acute Myocardial Infarction or myocardial injury due to an underlying chronic condition.       Results may be falsely decreased if patient taking Biotin.      BNP [[id number]]  (Normal) Collected:  03/17/20 0923    Specimen:  Blood from Arm, Left Updated:  03/17/20 0955     proBNP 203.1 pg/mL     Narrative:       Among patients with dyspnea, NT-proBNP is highly sensitive for the detection of acute congestive heart failure. In addition NT-proBNP of <300 pg/ml effectively rules out acute congestive heart failure with 99% negative predictive value.    Results may be falsely decreased if patient taking Biotin.      CBC & Differential [[id number]] Collected:  03/17/20 0923    Specimen:  Blood from Arm, Left Updated:  03/17/20 0933    Narrative:       The following orders were created for panel order CBC & Differential.  Procedure                               Abnormality         Status                     ---------                               -----------         ------                     CBC Auto Differential[[id number]]        Abnormal            Final result                 Please view results for these tests on the individual orders.    CBC Auto Differential [[id number]]  (Abnormal) Collected:  03/17/20 0923    Specimen:  Blood from Arm, Left Updated:  03/17/20 0933     WBC 4.83 10*3/mm3      RBC 4.22 10*6/mm3      Hemoglobin 13.8 g/dL      Hematocrit 42.5 %      .7 fL      MCH 32.7 pg      MCHC 32.5 g/dL      RDW 13.3 %      RDW-SD 49.5 fl      MPV 11.6 fL      Platelets 79 10*3/mm3      Neutrophil % 74.2 %      Lymphocyte % 10.1 %      Monocyte % 14.9 %      Eosinophil % 0.2 %      Basophil % 0.4 %      Immature Grans % 0.2 %      Neutrophils, Absolute 3.58 10*3/mm3      Lymphocytes, Absolute 0.49 10*3/mm3      Monocytes, Absolute 0.72 10*3/mm3      Eosinophils, Absolute 0.01 10*3/mm3      Basophils, Absolute 0.02 10*3/mm3      Immature Grans, Absolute 0.01 10*3/mm3       nRBC 0.0 /100 WBC     Blood Culture - Blood, Arm, Left [[id number]] Collected:  03/17/20 0923    Specimen:  Blood from Arm, Left Updated:  03/17/20 0928    Blood Gas, Arterial With Co-Ox [[id number]]  (Abnormal) Collected:  03/17/20 0911    Specimen:  Arterial Blood Updated:  03/17/20 0917     Site Left Radial     Neal's Test Positive     pH, Arterial 7.385 pH units      pCO2, Arterial 46.7 mm Hg      pO2, Arterial 52.4 mm Hg      Comment: 85 Value below critical limit        HCO3, Arterial 27.9 mmol/L      Comment: 83 Value above reference range        Base Excess, Arterial 2.2 mmol/L      O2 Saturation, Arterial 87.8 %      Comment: 84 Value below reference range        Hemoglobin, Blood Gas 13.9 g/dL      Hematocrit, Blood Gas 42.6 %      Oxyhemoglobin 85.1 %      Comment: 84 Value below reference range        Methemoglobin 0.20 %      Carboxyhemoglobin 2.9 %      A-a Gradiant 39.0 mmHg      CO2 Content 29.3 mmol/L      Temperature 0.0 C      Barometric Pressure for Blood Gas 733 mmHg      Modality Room Air     FIO2 21 %      Ventilator Mode NA     Note --     Notified Jose BEAL, NP AND [facility]IN[facility] PATRICK, ER     Notified By [id number]     Notified Time 03/17/2020 09:20     Collected by [id number]     Comment: Meter: [id number]     :  [id number]        pH, Temp Corrected --     pCO2, Temperature Corrected --     pO2, Temperature Corrected --          Radiology:    Imaging Results (Last 72 Hours)     Procedure Component Value Units Date/Time    XR Chest 1 View [[id number]] Collected:  03/17/20 1011     Updated:  03/17/20 1156    Narrative:       EXAMINATION: XR CHEST 1 VW-      CLINICAL INDICATION:     cough, sob     TECHNIQUE:  XR CHEST 1 VW-      COMPARISON: 10/10/2015      FINDINGS:      Lungs are aerated.   Heart size, mediastinum, and pulmonary vascularity are unremarkable.   No pneumothorax.   No effusions.   No acute osseous findings.          Impression:       No radiographic evidence of acute cardiac or  pulmonary  disease.     This report was finalized on 3/17/2020 10:19 AM by Dr. Mika Vasquez MD.       CT Abdomen Pelvis Stone Protocol [[id number]] Collected:  03/17/20 1150     Updated:  03/17/20 1156    Narrative:       EXAM: CT ABDOMEN PELVIS STONE PROTOCOL-            TECHNIQUE: Multiple axial CT images were obtained from lung bases  through pubic symphysis WITHOUT administration of IV contrast.  Reformatted images in the coronal and/or sagittal plane(s) were  generated from the axial data set to facilitate diagnostic accuracy  and/or surgical planning.  Oral Contrast:NONE.     Radiation dose reduction techniques were utilized per ALARA protocol.  Automated exposure control was initiated through either or [employer] or  DoseRight software packages by  protocol.       DOSE:     Clinical information  Hematuria      Comparison  Comparison: 02/10/2020     Findings  LOWER THORAX: STABLE MILD CARDIOMEGALY. CENTRILOBULAR NODULES RIGHT  LOWER LOBE COMPATIBLE WITH ATYPICAL PNEUMONIA. NO CONSOLIDATIVE  PNEUMONIA IDENTIFIED.     ABDOMEN:        LIVER: LIVER CIRRHOSIS CHANGES ARE AGAIN NOTED WITH NO FOCAL LIVER  LESION OR PERIHEPATIC ASCITES IDENTIFIED.        GALLBLADDER: CHOLECYSTECTOMY.        PANCREAS: Unremarkable. No mass or ductal dilatation.        SPLEEN: BORDERLINE ENLARGEMENT OF THE SPLEEN IS STABLE.        ADRENALS: No mass.        KIDNEYS/URETERS: NO RENAL OR URETERAL STONES OR HYDRONEPHROSIS NOTED.        GI TRACT: Non-dilated. No definite wall thickening.        PERITONEUM: No free air. No free fluid or loculated fluid  collections.        MESENTERY: Unremarkable.        LYMPH NODES: PROMINENT AND LIKELY REACTIVE MILDLY ENLARGED CIERA  HEPATIS AND UPPER ABDOMINAL LYMPH NODES.        VASCULATURE: STABLE MILD ATHEROSCLEROSIS.        ABDOMINAL WALL: No focal hernia or mass.           OTHER: None.     PELVIS:        BLADDER: URINARY BLADDER IS INCOMPLETELY DISTENDED BUT IS OTHERWISE  UNREMARKABLE IN  APPEARANCE.        REPRODUCTIVE: HYSTERECTOMY.        APPENDIX: APPENDECTOMY.     BONES: DEGENERATIVE CHANGES LUMBAR SPINE BUT NO ACUTE BONY FINDINGS  IDENTIFIED.       Impression:       Impression:  1. Right lower lobe atypical pneumonia.  2. Liver cirrhosis. No significant ascites.  3. No renal or ureteral stones. No hydronephrosis.  4. Other nonacute/incidental findings as above.     This report was finalized on 3/17/2020 11:52 AM by Dr. Mika Vasquez MD.       CT Chest With Contrast [[id number]] Collected:  03/17/20 1152     Updated:  03/17/20 1156    Narrative:       EXAM: CT CHEST W CONTRAST-            CLINICAL INDICATION:cough, sob      COMPARISON: NONE.     TECHNIQUE: Multiple axial CT images were obtained from lung apex through  upper abdomen WITH administration of IV contrast. Reformatted images in  the coronal and/or sagittal plane(s) were generated from the axial data  set to facilitate diagnostic accuracy and/or surgical planning.     Radiation dose reduction techniques were utilized per ALARA protocol.  Automated exposure control was initiated through either or [employer] or  DoseRight software packages by  protocol.    DOSE (DLP mGy-cm):        FINDINGS:     LUNGS: CENTRILOBULAR NODULES OF THE RIGHT UPPER LOBE AND RIGHT LOWER  LOBE AND MINIMAL CENTRILOBULAR NODULES IN THE LEFT LOWER LOBE SUPERIOR  SEGMENT MOST CONSISTENT WITH RIGHT GREATER THAN LEFT BILATERAL ATYPICAL  PNEUMONIA. NO LOBAR CONSOLIDATION IDENTIFIED.     HEART: MILD CARDIOMEGALY IS NOTED.     PERICARDIUM: No effusion.     MEDIASTINUM: PROBABLE REACTIVE LYMPH NODES IN THE RIGHT HILUM AND  MEDIASTINUM BUT NO BULKY ADENOPATHY IDENTIFIED.     PLEURA: No pleural effusion. No pleural mass or abnormal calcification.  No pneumothorax.     MAJOR AIRWAYS: Clear. No intrinsic mass.     VASCULATURE: No evidence of aneurysm.     VISUALIZED UPPER ABDOMEN:LIVER CIRRHOSIS AND MILD SPLENOMEGALY. PRIOR  CHOLECYSTECTOMY.     OTHER: None.        BONES: DEGENERATIVE CHANGES THORACIC SPINE BUT NO ACUTE BONY FINDINGS  IDENTIFIED.       Impression:       1. Bilateral right greater than left centrilobular nodularity as  detailed above most consistent with bilateral atypical pneumonia.  2. Probable reactive borderline adenopathy of the right hilum and  mediastinum. No bulky adenopathy identified.  3. Mild cardiomegaly.  4. Liver cirrhosis. Other nonacute findings as above.     This report was finalized on 3/17/2020 11:53 AM by Dr. Mika Vasquez MD.           CT reviewed, pneumonia component appears minimal       Assessment/Plan:   Severe sepsis secondary to influenza and associated pneumonia.  Patient has been started on Tamiflu as well as vancomycin and Rocephin.  MRSA nasal swab has been added, if negative possibly vancomycin could be stopped.  Will follow inflammatory markers.  Recheck lactic acid in the a.m.  Pulmonology has been consulted.     Acute hypoxic respiratory failure secondary to influenza, she also has some element of reactive airway disease with significant wheezing.  She may have COPD but I do not have PFTs to confirm this.  She quit smoking a month and half ago.  I have added nebulizer therapy as well as systemic steroids considering the wheezing and hypoxia.    Headache, neck is supple, will get CT head, will try Demerol on a as needed basis.  Cannot use Toradol as her platelets are low.    Thrombocytopenia, chronic, she had splenomegaly previously.  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  This needs to be looked into as an outpatient.    DVT prophylaxis, with thrombocytopenia, patient has been placed on SCUDs.    Borderline EKG, repeat pending, will track troponins    Morbid obesity by BMI adversely affecting her health    Hematuria, uncertain etiology, no stone, will need to be followed up as an outpatient.  This is new from the February 10    Diabetes by previous A1c, monitor glucoses  especially on steroids on low-dose sliding scale insulin.

## 2020-03-17 NOTE — H&P
[facility]  HISTORY & PHYSICAL    Patient Identification:  Name:  Tiki Campos  Age:  50 y.o.  Sex:  female  :  1970  MRN:  [id number]   Visit Number:  [id number]  Primary Care Physician:  Bartolome Recinos MD     Subjective     Chief complaint:   Chief Complaint   Patient presents with   • Shortness of Breath   • Cough     History of presenting illness:   Patient is a 50 y.o. female with past medical history significant for insulin dependent diabetes mellitus, essential hypertension, hyperlipidemia, peripheral neuropathy, GERD, anxiety/depression, and morbid obesity that presented to the [facility] emergency department for evaluation of cough, congestion, and shortness of breath.  Patient states onset of symptoms was sudden yesterday.  Patient states she had frequent cough that is dry, chest congestion, and fever of 101.  Patient states that she has had worsening headache refractory to Tylenol.  She states that the headache is secondary to coughing so frequently.  She denies any vision disturbances, she does have some mild photophobia.  Patient denies any chest pain or radiating chest pain.  She does have progressively worsening shortness of breath, worse with exertion.  She does not utilize home oxygen therapy.  She does report nausea without emesis.  Denies any urinary symptoms.  She denies any recent travel or ill contacts.  She did have a recent visit to the hospital in outpatient setting for spinal x-rays on 3/12/2020.  She has not had any recent travel, no travel to any COVID-19 endemic areas, no recent exposure to any PUI or persons confirmed to have COVID 19.      Upon arrival to the ED, vitals were temperature 100.3, heart rate 106, respiratory rate 28, blood pressure 106/72, and oxygen saturation 93% on room air.  Initial ABG with pH 7.385, PCO2 46.7, PO2 52.4, HCO3 27.9, and oxygen saturation 87.8% on room air.  Troponin T negative,  proBNP within normal limits.  CMP with glucose 167, calcium 8.5, and AST 63 otherwise unremarkable.  Initial lactic acid 2.4.  CBC with .7, lymphocyte percentage decreased at 10.1%, and absolute lymphocyte count decreased to 0.49, otherwise unremarkable.  Urinalysis with dark yellow appearance, trace ketones, 3+ blood, trace leukocytes, trace protein, too numerous to count RBC, and 36 squamous epithelial cells.  Respiratory panel positive for H1N1 influenza A 2009.  Chest x-ray with no evidence of acute cardiopulmonary disease.  CT abdomen pelvis with stone protocol with right lower lobe atypical pneumonia, liver cirrhosis, otherwise stable with no acute findings.  CT chest with contrast bilateral right greater than left centrilobular nodularity most consistent with bilateral atypical pneumonia, probable reactive borderline adenopathy of the right hilum and mediastinum, no bulky adenopathy noted.  There is mild cardiomegaly and liver cirrhosis, no other acute findings. While in the ED, patient was administered 1000 mg PO tylenol X 1, 650 mg PO tylenol X 1, rocephin, doxycycline, Robitussin DM 5 mL, DuoNeb nebulizer treatment, 15 mg IV Toradol, and 75 mg PO Tamiflu.     Patient has been admitted to the progressive care unit for further evaluation and treatment.     Present during exam: N/A     COVID-19 RISK SCREEN:  1. Has the patient been exposed to a known COVID-19 (+) person(s) or a person who is considered to be a Person Under Investigation (PUI) for COVID-19 infection? No  2. Has the patient traveled to or are they from an endemic country or high-risk local area? No   3. Does the patient have baseline higher exposure risk such as working in the healthcare field? No      ---------------------------------------------------------------------------------------------------------------------   Review of Systems   Constitutional: Positive for activity change, chills, fatigue and fever. Negative for appetite  change and diaphoresis.   HENT: Negative for congestion, postnasal drip, rhinorrhea, sinus pain, sore throat and trouble swallowing.    Eyes: Negative for discharge and visual disturbance.   Respiratory: Positive for cough and shortness of breath. Negative for chest tightness and wheezing.    Cardiovascular: Negative for chest pain, palpitations and leg swelling.   Gastrointestinal: Positive for nausea. Negative for abdominal pain, constipation, diarrhea and vomiting.   Endocrine: Negative for cold intolerance and heat intolerance.   Genitourinary: Negative for decreased urine volume, dysuria, frequency and urgency.   Musculoskeletal: Negative for arthralgias, gait problem and myalgias.   Skin: Negative for color change, rash and wound.   Allergic/Immunologic: Negative for environmental allergies and immunocompromised state.   Neurological: Positive for weakness and headaches (states secondary to coughing). Negative for dizziness, syncope and light-headedness.   Hematological: Negative for adenopathy. Does not bruise/bleed easily.   Psychiatric/Behavioral: Negative for confusion and decreased concentration. The patient is not nervous/anxious.       ---------------------------------------------------------------------------------------------------------------------   Past Medical History:   Diagnosis Date   • Anxiety    • Depression    • Diabetes mellitus (CMS/HCC)    • Hyperlipidemia    • Hypertension      Past Surgical History:   Procedure Laterality Date   • APPENDECTOMY     • CHOLECYSTECTOMY     • HYSTERECTOMY     • TONSILLECTOMY       History reviewed. No pertinent family history.  Social History     Socioeconomic History   • Marital status:      Spouse name: Not on file   • Number of children: Not on file   • Years of education: Not on file   • Highest education level: Not on file   Tobacco Use   • Smoking status: Former Smoker     Packs/day: 0.50          ---------------------------------------------------------------------------------------------------------------------   Allergies:  Morphine  ---------------------------------------------------------------------------------------------------------------------   Medications below are reported home medications pulling from within the system; at this time, these medications have not been reconciled unless otherwise specified and are in the verification process for further verifcation as current home medications.    Prior to Admission Medications     Prescriptions Last Dose Informant Patient Reported? Taking?    albuterol sulfate  (90 Base) MCG/ACT inhaler  Pharmacy Yes No    Inhale 2 puffs Every 4 (Four) Hours As Needed for Wheezing.    atorvastatin (LIPITOR) 40 MG tablet  Pharmacy Yes No    Take 40 mg by mouth Daily.    clonazePAM (KlonoPIN) 0.5 MG tablet  Self Yes No    Take 0.5 mg by mouth 3 (Three) Times a Day.    coenzyme Q10 100 MG capsule  Self Yes No    Take 200 mg by mouth 2 (Two) Times a Day.    DULoxetine (CYMBALTA) 60 MG capsule  Pharmacy Yes No    Take 60 mg by mouth 2 (Two) Times a Day.    estrogens, conjugated, (PREMARIN) 0.625 MG tablet  Pharmacy Yes No    Take 0.625 mg by mouth Daily.    Evening Primrose Oil 1000 MG capsule  Self Yes No    Take 1 capsule by mouth Every Night.    insulin regular (HumuLIN R) 500 UNIT/ML patient supplied pump  Pharmacy Yes No    Inject  under the skin into the appropriate area as directed Continuous. Prior to [facility] Admission, Patient was on: basal 2.5u per hour    lactobacillus acidophilus (RISAQUAD) capsule capsule  Self Yes No    Take 1 capsule by mouth Daily.    multivitamin (DAILY TERRI) tablet tablet  Self Yes No    Take 1 tablet by mouth Every Night.    pantoprazole (PROTONIX) 40 MG EC tablet  Pharmacy Yes No    Take 40 mg by mouth 2 (Two) Times a Day.    pregabalin (LYRICA) 150 MG capsule  Pharmacy Yes No    Take 150 mg by mouth 2 (Two) Times a Day.     QUEtiapine (SEROquel) 300 MG tablet  Pharmacy Yes No    Take 150 mg by mouth Every Night.    ranolazine (RANEXA) 500 MG 12 hr tablet  Pharmacy Yes No    Take 500 mg by mouth 2 (Two) Times a Day.    vitamin E 400 UNIT capsule  Self Yes No    Take 400 Units by mouth Daily.        ---------------------------------------------------------------------------------------------------------------------    Objective     Hospital Scheduled Meds:          Current listed hospital scheduled medications may not yet reflect those currently placed in orders that are signed and held, awaiting patient's arrival to floor/unit.    ---------------------------------------------------------------------------------------------------------------------   Vital Signs:  Temp:  [99.4 °F (37.4 °C)-100.3 °F (37.9 °C)] 99.4 °F (37.4 °C)  Heart Rate:  [] 89  Resp:  [20-28] 20  BP: (106-150)/(72-95) 143/82  No data found.  SpO2 Percentage    03/17/20 1200 03/17/20 1300 03/17/20 1330   SpO2: 96% 100% 97%     SpO2:  [90 %-100 %] 97 %  on  Flow (L/min):  [2] 2;   Device (Oxygen Therapy): nasal cannula    Body mass index is 53.85 kg/m².  Wt Readings from Last 3 Encounters:   03/17/20 (!) 138 kg (304 lb)   02/12/20 (!) 144 kg (318 lb 3.2 oz)       ---------------------------------------------------------------------------------------------------------------------   Physical Exam:  Physical Exam   Constitutional: She is oriented to person, place, and time. She appears well-developed and well-nourished.  Non-toxic appearance. She appears ill. No distress. Nasal cannula in place.   Sitting up on edge of bed, appears to be in mild acute respiratory distress, appears ill feeling.   HENT:   Head: Normocephalic and atraumatic.   Right Ear: External ear normal.   Left Ear: External ear normal.   Nose: Nose normal.   Mouth/Throat: Oropharynx is clear and moist and mucous membranes are normal. No oropharyngeal exudate.   Eyes: Pupils are equal, round, and  reactive to light. Conjunctivae are normal. No scleral icterus.   Neck: Normal range of motion. Neck supple. No neck rigidity.   Cardiovascular: Normal rate, regular rhythm, normal heart sounds and intact distal pulses. Exam reveals no gallop and no friction rub.   No murmur heard.  Pulses:       Dorsalis pedis pulses are 2+ on the right side, and 2+ on the left side.        Posterior tibial pulses are 2+ on the right side, and 2+ on the left side.   Pulmonary/Chest: Effort normal. No accessory muscle usage. Tachypnea noted. No respiratory distress. She has decreased breath sounds. She has wheezes (End expiratory). She has no rhonchi. She has no rales. She exhibits no tenderness.   Speaks very short sentences.  Tachypneic.  Nasal cannula.   Abdominal: Soft. Bowel sounds are normal. She exhibits no distension. There is no tenderness. There is no rebound and no guarding.   Obese   Genitourinary:   Genitourinary Comments: No Lozoya catheter in place.   Musculoskeletal: She exhibits no edema, tenderness or deformity.        Right lower leg: She exhibits no swelling.        Left lower leg: She exhibits no swelling.     Vascular Status -  Her right foot exhibits normal foot vasculature  and no edema. Her left foot exhibits normal foot vasculature  and no edema.  Neurological: She is alert and oriented to person, place, and time. She has normal strength. No cranial nerve deficit or sensory deficit.   Awake and alert.  Follows commands.  Answers questions verbally.  Moves all extremities equally.  Strength and sensation intact.  No focal neurological deficits on exam.  Patient noted to ambulate by herself as I was entering the room with no issues.   Skin: Skin is warm and dry. Capillary refill takes less than 2 seconds. No rash noted. No erythema. No pallor.   Psychiatric: She has a normal mood and affect. Her speech is normal and behavior is normal. Judgment and thought content normal. Cognition and memory are normal.      Nursing note and vitals reviewed.    ---------------------------------------------------------------------------------------------------------------------  EKG: Pending cardiology read.  Per my view, heart rate 108 with sinus tachycardia,  ms, there is some T wave inversion inferiorly.    Telemetry:    Sinus 90s    I have personally reviewed the EKG/Telemetry strip  ---------------------------------------------------------------------------------------------------------------------   Results from last 7 days   Lab Units 03/17/20  0923   TROPONIN T ng/mL <0.010     Results from last 7 days   Lab Units 03/17/20  0923   PROBNP pg/mL 203.1       Results from last 7 days   Lab Units 03/17/20  0911   PH, ARTERIAL pH units 7.385   PO2 ART mm Hg 52.4*   PCO2, ARTERIAL mm Hg 46.7*   HCO3 ART mmol/L 27.9*     Results from last 7 days   Lab Units 03/17/20  0923   LACTATE mmol/L 2.4*   WBC 10*3/mm3 4.83   HEMOGLOBIN g/dL 13.8   HEMATOCRIT % 42.5   MCV fL 100.7*   MCHC g/dL 32.5   PLATELETS 10*3/mm3 79*     Results from last 7 days   Lab Units 03/17/20  0923   SODIUM mmol/L 138   POTASSIUM mmol/L 4.1   CHLORIDE mmol/L 100   CO2 mmol/L 24.9   BUN mg/dL 8   CREATININE mg/dL 0.77   EGFR IF NONAFRICN AM mL/min/1.73 80   CALCIUM mg/dL 8.5*   GLUCOSE mg/dL 167*   ALBUMIN g/dL 3.59   BILIRUBIN mg/dL 0.7   ALK PHOS U/L 90   AST (SGOT) U/L 63*   ALT (SGPT) U/L 28   Estimated Creatinine Clearance: 120.8 mL/min (by C-G formula based on SCr of 0.77 mg/dL).    Lab Results   Component Value Date    HGBA1C 8.60 (H) 02/10/2020     Lab Results   Component Value Date    TSH 2.370 02/10/2020     Microbiology Results (last 10 days)     Procedure Component Value - Date/Time    Respiratory Panel, PCR - Swab, Nasopharynx [[id number]]  (Abnormal) Collected:  03/17/20 0921    Lab Status:  Final result Specimen:  Swab from Nasopharynx Updated:  03/17/20 1050     ADENOVIRUS, PCR Not Detected     Coronavirus 229E Not Detected     Coronavirus HKU1  Not Detected     Coronavirus NL63 Not Detected     Coronavirus OC43 Not Detected     Human Metapneumovirus Not Detected     Human Rhinovirus/Enterovirus Not Detected     Influenza B PCR Not Detected     Parainfluenza Virus 1 Not Detected     Parainfluenza Virus 2 Not Detected     Parainfluenza Virus 3 Not Detected     Parainfluenza Virus 4 Not Detected     Bordetella pertussis pcr Not Detected     Influenza A H1 2009 PCR Detected     Chlamydophila pneumoniae PCR Not Detected     Mycoplasma pneumo by PCR Not Detected     Influenza A PCR Not Detected     Influenza A H3 Not Detected     Influenza A H1 Not Detected     RSV, PCR Not Detected    Narrative:       The coronavirus on the RVP is NOT COVID-19 and is NOT indicative of infection with COVID-19.          I have personally reviewed the above laboratory results.   ---------------------------------------------------------------------------------------------------------------------  Imaging Results (Last 7 Days)     Procedure Component Value Units Date/Time    XR Chest 1 View [[id number]] Collected:  03/17/20 1011     Updated:  03/17/20 1156    Narrative:       EXAMINATION: XR CHEST 1 VW-      CLINICAL INDICATION:     cough, sob     TECHNIQUE:  XR CHEST 1 VW-      COMPARISON: 10/10/2015      FINDINGS:      Lungs are aerated.   Heart size, mediastinum, and pulmonary vascularity are unremarkable.   No pneumothorax.   No effusions.   No acute osseous findings.          Impression:       No radiographic evidence of acute cardiac or pulmonary  disease.     This report was finalized on 3/17/2020 10:19 AM by Dr. Mika Vasquez MD.       CT Abdomen Pelvis Stone Protocol [[id number]] Collected:  03/17/20 1150     Updated:  03/17/20 1156    Narrative:       EXAM: CT ABDOMEN PELVIS STONE PROTOCOL-            TECHNIQUE: Multiple axial CT images were obtained from lung bases  through pubic symphysis WITHOUT administration of IV contrast.  Reformatted images in the coronal and/or  sagittal plane(s) were  generated from the axial data set to facilitate diagnostic accuracy  and/or surgical planning.  Oral Contrast:NONE.     Radiation dose reduction techniques were utilized per ALARA protocol.  Automated exposure control was initiated through either or [employer] or  [employer] software packages by  protocol.       DOSE:     Clinical information  Hematuria      Comparison  Comparison: 02/10/2020     Findings  LOWER THORAX: STABLE MILD CARDIOMEGALY. CENTRILOBULAR NODULES RIGHT  LOWER LOBE COMPATIBLE WITH ATYPICAL PNEUMONIA. NO CONSOLIDATIVE  PNEUMONIA IDENTIFIED.     ABDOMEN:        LIVER: LIVER CIRRHOSIS CHANGES ARE AGAIN NOTED WITH NO FOCAL LIVER  LESION OR PERIHEPATIC ASCITES IDENTIFIED.        GALLBLADDER: CHOLECYSTECTOMY.        PANCREAS: Unremarkable. No mass or ductal dilatation.        SPLEEN: BORDERLINE ENLARGEMENT OF THE SPLEEN IS STABLE.        ADRENALS: No mass.        KIDNEYS/URETERS: NO RENAL OR URETERAL STONES OR HYDRONEPHROSIS NOTED.        GI TRACT: Non-dilated. No definite wall thickening.        PERITONEUM: No free air. No free fluid or loculated fluid  collections.        MESENTERY: Unremarkable.        LYMPH NODES: PROMINENT AND LIKELY REACTIVE MILDLY ENLARGED CIERA  HEPATIS AND UPPER ABDOMINAL LYMPH NODES.        VASCULATURE: STABLE MILD ATHEROSCLEROSIS.        ABDOMINAL WALL: No focal hernia or mass.           OTHER: None.     PELVIS:        BLADDER: URINARY BLADDER IS INCOMPLETELY DISTENDED BUT IS OTHERWISE  UNREMARKABLE IN APPEARANCE.        REPRODUCTIVE: HYSTERECTOMY.        APPENDIX: APPENDECTOMY.     BONES: DEGENERATIVE CHANGES LUMBAR SPINE BUT NO ACUTE BONY FINDINGS  IDENTIFIED.       Impression:       Impression:  1. Right lower lobe atypical pneumonia.  2. Liver cirrhosis. No significant ascites.  3. No renal or ureteral stones. No hydronephrosis.  4. Other nonacute/incidental findings as above.     This report was finalized on 3/17/2020 11:52 AM by   Mika Vasquez MD.       CT Chest With Contrast [[id number]] Collected:  03/17/20 1152     Updated:  03/17/20 1156    Narrative:       EXAM: CT CHEST W CONTRAST-            CLINICAL INDICATION:cough, sob      COMPARISON: NONE.     TECHNIQUE: Multiple axial CT images were obtained from lung apex through  upper abdomen WITH administration of IV contrast. Reformatted images in  the coronal and/or sagittal plane(s) were generated from the axial data  set to facilitate diagnostic accuracy and/or surgical planning.     Radiation dose reduction techniques were utilized per ALARA protocol.  Automated exposure control was initiated through either or [facility] or  [employer] software packages by  protocol.    DOSE (DLP mGy-cm):        FINDINGS:     LUNGS: CENTRILOBULAR NODULES OF THE RIGHT UPPER LOBE AND RIGHT LOWER  LOBE AND MINIMAL CENTRILOBULAR NODULES IN THE LEFT LOWER LOBE SUPERIOR  SEGMENT MOST CONSISTENT WITH RIGHT GREATER THAN LEFT BILATERAL ATYPICAL  PNEUMONIA. NO LOBAR CONSOLIDATION IDENTIFIED.     HEART: MILD CARDIOMEGALY IS NOTED.     PERICARDIUM: No effusion.     MEDIASTINUM: PROBABLE REACTIVE LYMPH NODES IN THE RIGHT HILUM AND  MEDIASTINUM BUT NO BULKY ADENOPATHY IDENTIFIED.     PLEURA: No pleural effusion. No pleural mass or abnormal calcification.  No pneumothorax.     MAJOR AIRWAYS: Clear. No intrinsic mass.     VASCULATURE: No evidence of aneurysm.     VISUALIZED UPPER ABDOMEN:LIVER CIRRHOSIS AND MILD SPLENOMEGALY. PRIOR  CHOLECYSTECTOMY.     OTHER: None.     BONES: DEGENERATIVE CHANGES THORACIC SPINE BUT NO ACUTE BONY FINDINGS  IDENTIFIED.       Impression:       1. Bilateral right greater than left centrilobular nodularity as  detailed above most consistent with bilateral atypical pneumonia.  2. Probable reactive borderline adenopathy of the right hilum and  mediastinum. No bulky adenopathy identified.  3. Mild cardiomegaly.  4. Liver cirrhosis. Other nonacute findings as above.     This report  was finalized on 3/17/2020 11:53 AM by Dr. Mika Vasquez MD.           I have personally reviewed the above radiology results.   ---------------------------------------------------------------------------------------------------------------------    Assessment & Plan        · Severe sepsis, present on admission, secondary to bilateral community acquired pneumonia with acute H1N1 Influenza A 2009: Patient met severe sepsis criteria with HR > 90, RR > 20, lactic acid > 2, respiratory failure, and evidence of pneumonia on radiological imaging. Blood cultures obtained in ED, add respiratory culture and follow for final results. Respiratory panel PCR positive for H1N1 influenza 2009. Continue Tamiflu course. Empirically treat concomitant pneumonia with IV rocephin and pharmacy to dose vancomycin for MRSA coverage in setting of influenza virus. Obtain MRSA and legionella testing as well as rapid strep. Nebs. Supp O2 PRN to titrate SpO2 > 90%. Closely monitor vitals and temperature curve, PRN tylenol.   · Acute hypoxic and mild hypercarbic respiratory failure: Treatment as outlined above. Supplemental O2 as necessary to titrate SPO2 > 90%. Pulmonary consult, input/assistance is much appreciated.  · Headache: Obtain noncontrasted head CT  · Thrombocytopenia: Appears chronic and slightly worse than normal.  No active bleeding.  Monitor closely, repeat CBC in a.m.  · Insulin dependent type II diabetes mellitus: Obtain HgbA1C. Patient on insulin pump at home, hold for now. SSI. Closely monitor blood glucose levels with accuchecks QAC and QHS. Titrate insulin therapy as necessary.   · Essential hypertension: BP controlled. Resume home antihypertensive regimen once reconciled per pharamacy. Closely monitor BP per hospital protocol, titrate medications as necessary.   · Hyperlipidemia: Continue home statin   · GERD: PPI  · Peripheral neuropathy: Resume home Lyrica once dose confirmed per pharmacy, obtain  MAN  · Anxiety/depression: Supportive care. Resume home meds once reconciled per pharmacy.   · Former smoker  · Morbid obesity, BMI 53.85  · F/E/N: No IV fluids. Replace electrolytes per protocol as necessary. Consistent carbohydrate diet.    ---------------------------------------------------  DVT Prophylaxis: SQ heparin   GI Prophylaxis: PPI  Activity: Up with assistance as tolerated.    The patient is considered to be a high risk patient due to: Severe sepsis, pneumonia, H1N1 influenza A, respiratory failure, multiple co morbidities effects all aspects of care, and morbid obesity    INPATIENT status due to the need for care which can only be reasonably provided in an hospital setting such as aggressive/expedited ancillary services and/or consultation services, the necessity for IV medications, close physician monitoring and/or the possible need for procedures.  In such, I feel patient’s risk for adverse outcomes and need for care warrant INPATIENT evaluation and predict the patient’s care encounter to likely last beyond 2 midnights.    Code Status: FULL CODE    I have discussed the patient's assessment and plan with the patient and nursing staff.      Brandy Solis PA-C  Hospitalist Service -- [facility]   Pager: [phone]    03/17/20  13:36    Attending Physician: Marcelino Dunaway MD      ---------------------------------------------------------------------------------------------------------------------

## 2020-03-17 NOTE — ED PROVIDER NOTES
Subjective   50-year-old white female presents secondary to cough congestion shortness of breath.  She states her symptoms started yesterday.  She states that Sunday she may have had a mild cough.  This hit suddenly.  She has had a headache.  She does report body aches.  EMS picked her up.  Upon arrival she had an oxygen saturation of 88%.  She does not wear oxygen.  She denies any history of respiratory disease.  She is a former smoker.  She states that she has been wheezing.          Review of Systems   Constitutional: Positive for fever.   HENT: Negative.    Respiratory: Positive for cough, shortness of breath and wheezing.    Cardiovascular: Negative.  Negative for chest pain.   Gastrointestinal: Negative.  Negative for abdominal pain.   Endocrine: Negative.    Genitourinary: Negative.  Negative for dysuria.   Skin: Negative.    Neurological: Negative.    Psychiatric/Behavioral: Negative.    All other systems reviewed and are negative.      Past Medical History:   Diagnosis Date   • Anxiety    • Depression    • Diabetes mellitus (CMS/HCC)    • Hyperlipidemia    • Hypertension        Allergies   Allergen Reactions   • Morphine Hives       Past Surgical History:   Procedure Laterality Date   • APPENDECTOMY     • CHOLECYSTECTOMY     • HYSTERECTOMY     • TONSILLECTOMY         History reviewed. No pertinent family history.    Social History     Socioeconomic History   • Marital status:      Spouse name: Not on file   • Number of children: Not on file   • Years of education: Not on file   • Highest education level: Not on file   Tobacco Use   • Smoking status: Former Smoker     Packs/day: 0.50           Objective   Physical Exam   Constitutional: She is oriented to person, place, and time. She appears well-developed and well-nourished. No distress.   HENT:   Head: Normocephalic and atraumatic.   Right Ear: External ear normal.   Left Ear: External ear normal.   Nose: Nose normal.   Eyes: Pupils are equal,  round, and reactive to light. Conjunctivae and EOM are normal.   Neck: Normal range of motion. Neck supple. No JVD present. No tracheal deviation present.   Cardiovascular: Normal rate, regular rhythm and normal heart sounds.   No murmur heard.  Pulmonary/Chest: Effort normal and breath sounds normal. No respiratory distress. She has no wheezes.   Abdominal: Soft. Bowel sounds are normal. There is no tenderness.   Musculoskeletal: Normal range of motion. She exhibits no edema or deformity.   Neurological: She is alert and oriented to person, place, and time. No cranial nerve deficit.   Skin: Skin is warm and dry. No rash noted. She is not diaphoretic. No erythema. No pallor.   Psychiatric: She has a normal mood and affect. Her behavior is normal. Thought content normal.   Nursing note and vitals reviewed.      Procedures           ED Course  ED Course as of Mar 17 1344   Tue Mar 17, 2020   1248 I saw this patient, and agree with Burt's plan of care.    [CM]      ED Course User Index  [CM] Johny Arenas MD                                           MDM  Number of Diagnoses or Management Options  Influenza A (H1N1): new and requires workup  Pneumonia of both lungs due to infectious organism, unspecified part of lung: new and requires workup     Amount and/or Complexity of Data Reviewed  Clinical lab tests: reviewed and ordered  Tests in the radiology section of CPT®: reviewed and ordered  Tests in the medicine section of CPT®: reviewed and ordered  Decide to obtain previous medical records or to obtain history from someone other than the patient: yes  Discuss the patient with other providers: yes  Independent visualization of images, tracings, or specimens: yes    Risk of Complications, Morbidity, and/or Mortality  Presenting problems: moderate        Final diagnoses:   Influenza A (H1N1)   Pneumonia of both lungs due to infectious organism, unspecified part of lung            Quinton Deras PA  03/17/20  1942

## 2020-03-17 NOTE — PROGRESS NOTES
Kinetics :  Vancomycin  Day 1    The patient has been evaluated for vancomycin therapy for pneumonia and influenza A.  We will load the patient with vancomycin 1750mg x 1 and follow with 1250mg q 8 hrs in anticipation of therapeutic levels and monitor with you.      The tamiflu dose shall be 75mg bid x 5 days.

## 2020-03-17 NOTE — ED NOTES
Received report from Ashutosh PATRICK, introduce myself to the pt, pt has no sign of distress, vs stable. No request at this time.     Chioma Dan, CELINA  03/17/20 [id number]

## 2020-03-17 NOTE — ED NOTES
Pt leaving ED with ED nurses, vs stable, no sign of distress, IV patent.     Chioma Dan, RN  03/17/20 [id number]

## 2020-03-18 ENCOUNTER — APPOINTMENT (OUTPATIENT)
Dept: CARDIOLOGY | Facility: HOSPITAL | Age: 50
End: 2020-03-18

## 2020-03-18 LAB
A-A DO2: 62.5 MMHG (ref 0–300)
ALBUMIN SERPL-MCNC: 3.16 G/DL (ref 3.5–5.2)
ALBUMIN/GLOB SERPL: 0.9 G/DL
ALP SERPL-CCNC: 82 U/L (ref 39–117)
ALT SERPL W P-5'-P-CCNC: 38 U/L (ref 1–33)
ANION GAP SERPL CALCULATED.3IONS-SCNC: 14.5 MMOL/L (ref 5–15)
ARTERIAL PATENCY WRIST A: POSITIVE
AST SERPL-CCNC: 112 U/L (ref 1–32)
ATMOSPHERIC PRESS: 731 MMHG
BASE EXCESS BLDA CALC-SCNC: 1.4 MMOL/L (ref 0–2)
BASOPHILS # BLD AUTO: 0.01 10*3/MM3 (ref 0–0.2)
BASOPHILS NFR BLD AUTO: 0.3 % (ref 0–1.5)
BDY SITE: ABNORMAL
BH CV ECHO MEAS - % IVS THICK: -7.9 %
BH CV ECHO MEAS - % LVPW THICK: 3.5 %
BH CV ECHO MEAS - ACS: 2.3 CM
BH CV ECHO MEAS - AO MAX PG: 12.8 MMHG
BH CV ECHO MEAS - AO MEAN PG: 5 MMHG
BH CV ECHO MEAS - AO ROOT AREA (BSA CORRECTED): 1.4
BH CV ECHO MEAS - AO ROOT AREA: 8.3 CM^2
BH CV ECHO MEAS - AO ROOT DIAM: 3.3 CM
BH CV ECHO MEAS - AO V2 MAX: 179 CM/SEC
BH CV ECHO MEAS - AO V2 MEAN: 104 CM/SEC
BH CV ECHO MEAS - AO V2 VTI: 35.9 CM
BH CV ECHO MEAS - BSA(HAYCOCK): 2.6 M^2
BH CV ECHO MEAS - BSA: 2.3 M^2
BH CV ECHO MEAS - BZI_BMI: 55.1 KILOGRAMS/M^2
BH CV ECHO MEAS - BZI_METRIC_HEIGHT: 160 CM
BH CV ECHO MEAS - BZI_METRIC_WEIGHT: 141.1 KG
BH CV ECHO MEAS - EDV(CUBED): 112.3 ML
BH CV ECHO MEAS - EDV(MOD-SP4): 94 ML
BH CV ECHO MEAS - EDV(TEICH): 108.8 ML
BH CV ECHO MEAS - EF(CUBED): 59.2 %
BH CV ECHO MEAS - EF(MOD-SP4): 57.4 %
BH CV ECHO MEAS - EF(TEICH): 50.6 %
BH CV ECHO MEAS - ESV(CUBED): 45.9 ML
BH CV ECHO MEAS - ESV(MOD-SP4): 40 ML
BH CV ECHO MEAS - ESV(TEICH): 53.7 ML
BH CV ECHO MEAS - FS: 25.8 %
BH CV ECHO MEAS - IVS/LVPW: 0.92
BH CV ECHO MEAS - IVSD: 1.2 CM
BH CV ECHO MEAS - IVSS: 1.1 CM
BH CV ECHO MEAS - LA DIMENSION: 3.5 CM
BH CV ECHO MEAS - LA/AO: 1.1
BH CV ECHO MEAS - LV DIASTOLIC VOL/BSA (35-75): 40.3 ML/M^2
BH CV ECHO MEAS - LV MASS(C)D: 232.8 GRAMS
BH CV ECHO MEAS - LV MASS(C)DI: 99.8 GRAMS/M^2
BH CV ECHO MEAS - LV MASS(C)S: 143.9 GRAMS
BH CV ECHO MEAS - LV MASS(C)SI: 61.7 GRAMS/M^2
BH CV ECHO MEAS - LV SYSTOLIC VOL/BSA (12-30): 17.1 ML/M^2
BH CV ECHO MEAS - LVIDD: 4.8 CM
BH CV ECHO MEAS - LVIDS: 3.6 CM
BH CV ECHO MEAS - LVLD AP4: 7.5 CM
BH CV ECHO MEAS - LVLS AP4: 6.5 CM
BH CV ECHO MEAS - LVOT AREA (M): 4.2 CM^2
BH CV ECHO MEAS - LVOT AREA: 4.2 CM^2
BH CV ECHO MEAS - LVOT DIAM: 2.3 CM
BH CV ECHO MEAS - LVPWD: 1.3 CM
BH CV ECHO MEAS - LVPWS: 1.3 CM
BH CV ECHO MEAS - MV A MAX VEL: 98 CM/SEC
BH CV ECHO MEAS - MV E MAX VEL: 151 CM/SEC
BH CV ECHO MEAS - MV E/A: 1.5
BH CV ECHO MEAS - PA ACC TIME: 0.12 SEC
BH CV ECHO MEAS - PA PR(ACCEL): 23.7 MMHG
BH CV ECHO MEAS - SI(AO): 127.7 ML/M^2
BH CV ECHO MEAS - SI(CUBED): 28.5 ML/M^2
BH CV ECHO MEAS - SI(MOD-SP4): 23.1 ML/M^2
BH CV ECHO MEAS - SI(TEICH): 23.6 ML/M^2
BH CV ECHO MEAS - SV(AO): 297.8 ML
BH CV ECHO MEAS - SV(CUBED): 66.4 ML
BH CV ECHO MEAS - SV(MOD-SP4): 54 ML
BH CV ECHO MEAS - SV(TEICH): 55.1 ML
BILIRUB SERPL-MCNC: 0.5 MG/DL (ref 0.2–1.2)
BODY TEMPERATURE: 0 C
BUN BLD-MCNC: 10 MG/DL (ref 6–20)
BUN/CREAT SERPL: 14.3 (ref 7–25)
CALCIUM SPEC-SCNC: 8.2 MG/DL (ref 8.6–10.5)
CHLORIDE SERPL-SCNC: 100 MMOL/L (ref 98–107)
CO2 BLDA-SCNC: 28.2 MMOL/L (ref 22–33)
CO2 SERPL-SCNC: 22.5 MMOL/L (ref 22–29)
COHGB MFR BLD: 1.3 % (ref 0–5)
CREAT BLD-MCNC: 0.7 MG/DL (ref 0.57–1)
CRP SERPL-MCNC: 5.8 MG/DL (ref 0–0.5)
D-LACTATE SERPL-SCNC: 2.1 MMOL/L (ref 0.5–2)
DEPRECATED RDW RBC AUTO: 50.4 FL (ref 37–54)
EOSINOPHIL # BLD AUTO: 0 10*3/MM3 (ref 0–0.4)
EOSINOPHIL NFR BLD AUTO: 0 % (ref 0.3–6.2)
ERYTHROCYTE [DISTWIDTH] IN BLOOD BY AUTOMATED COUNT: 13.4 % (ref 12.3–15.4)
FOLATE SERPL-MCNC: >20 NG/ML (ref 4.78–24.2)
GFR SERPL CREATININE-BSD FRML MDRD: 89 ML/MIN/1.73
GLOBULIN UR ELPH-MCNC: 3.5 GM/DL
GLUCOSE BLD-MCNC: 142 MG/DL (ref 65–99)
GLUCOSE BLDC GLUCOMTR-MCNC: 144 MG/DL (ref 70–130)
GLUCOSE BLDC GLUCOMTR-MCNC: 164 MG/DL (ref 70–130)
GLUCOSE BLDC GLUCOMTR-MCNC: 229 MG/DL (ref 70–130)
GLUCOSE BLDC GLUCOMTR-MCNC: 267 MG/DL (ref 70–130)
HBA1C MFR BLD: 7.5 % (ref 4.8–5.6)
HCO3 BLDA-SCNC: 26.8 MMOL/L (ref 20–26)
HCT VFR BLD AUTO: 40.6 % (ref 34–46.6)
HCT VFR BLD CALC: 43 % (ref 38–51)
HGB BLD-MCNC: 13.2 G/DL (ref 12–15.9)
HGB BLDA-MCNC: 14 G/DL (ref 13.5–17.5)
HOROWITZ INDEX BLD+IHG-RTO: 28 %
IMM GRANULOCYTES # BLD AUTO: 0.01 10*3/MM3 (ref 0–0.05)
IMM GRANULOCYTES NFR BLD AUTO: 0.3 % (ref 0–0.5)
INR PPP: 1.15 (ref 0.9–1.1)
LYMPHOCYTES # BLD AUTO: 0.63 10*3/MM3 (ref 0.7–3.1)
LYMPHOCYTES NFR BLD AUTO: 17.5 % (ref 19.6–45.3)
Lab: ABNORMAL
MCH RBC QN AUTO: 32.8 PG (ref 26.6–33)
MCHC RBC AUTO-ENTMCNC: 32.5 G/DL (ref 31.5–35.7)
MCV RBC AUTO: 101 FL (ref 79–97)
METHGB BLD QL: 0 % (ref 0–3)
MODALITY: ABNORMAL
MONOCYTES # BLD AUTO: 0.37 10*3/MM3 (ref 0.1–0.9)
MONOCYTES NFR BLD AUTO: 10.3 % (ref 5–12)
NEUTROPHILS # BLD AUTO: 2.57 10*3/MM3 (ref 1.7–7)
NEUTROPHILS NFR BLD AUTO: 71.6 % (ref 42.7–76)
NOTE: ABNORMAL
NRBC BLD AUTO-RTO: 0 /100 WBC (ref 0–0.2)
OXYHGB MFR BLDV: 95.1 % (ref 94–99)
PCO2 BLDA: 44.1 MM HG (ref 35–45)
PCO2 TEMP ADJ BLD: ABNORMAL MM[HG]
PH BLDA: 7.39 PH UNITS (ref 7.35–7.45)
PH, TEMP CORRECTED: ABNORMAL
PLATELET # BLD AUTO: 60 10*3/MM3 (ref 140–450)
PMV BLD AUTO: 12 FL (ref 6–12)
PO2 BLDA: 79.8 MM HG (ref 83–108)
PO2 TEMP ADJ BLD: ABNORMAL MM[HG]
POTASSIUM BLD-SCNC: 4 MMOL/L (ref 3.5–5.2)
PROCALCITONIN SERPL-MCNC: 0.12 NG/ML (ref 0.1–0.25)
PROT SERPL-MCNC: 6.7 G/DL (ref 6–8.5)
PROTHROMBIN TIME: 15.3 SECONDS (ref 11–15.4)
RBC # BLD AUTO: 4.02 10*6/MM3 (ref 3.77–5.28)
SAO2 % BLDCOA: 96.4 % (ref 94–99)
SODIUM BLD-SCNC: 137 MMOL/L (ref 136–145)
TROPONIN T SERPL-MCNC: <0.01 NG/ML (ref 0–0.03)
VENTILATOR MODE: ABNORMAL
VIT B12 BLD-MCNC: 705 PG/ML (ref 211–946)
WBC NRBC COR # BLD: 3.59 10*3/MM3 (ref 3.4–10.8)

## 2020-03-18 PROCEDURE — 83050 HGB METHEMOGLOBIN QUAN: CPT

## 2020-03-18 PROCEDURE — 99232 SBSQ HOSP IP/OBS MODERATE 35: CPT | Performed by: INTERNAL MEDICINE

## 2020-03-18 PROCEDURE — 82746 ASSAY OF FOLIC ACID SERUM: CPT | Performed by: INTERNAL MEDICINE

## 2020-03-18 PROCEDURE — 84484 ASSAY OF TROPONIN QUANT: CPT | Performed by: INTERNAL MEDICINE

## 2020-03-18 PROCEDURE — 83605 ASSAY OF LACTIC ACID: CPT | Performed by: PHYSICIAN ASSISTANT

## 2020-03-18 PROCEDURE — 82607 VITAMIN B-12: CPT | Performed by: INTERNAL MEDICINE

## 2020-03-18 PROCEDURE — 85025 COMPLETE CBC W/AUTO DIFF WBC: CPT | Performed by: INTERNAL MEDICINE

## 2020-03-18 PROCEDURE — 82805 BLOOD GASES W/O2 SATURATION: CPT

## 2020-03-18 PROCEDURE — 99254 IP/OBS CNSLTJ NEW/EST MOD 60: CPT | Performed by: INTERNAL MEDICINE

## 2020-03-18 PROCEDURE — 94799 UNLISTED PULMONARY SVC/PX: CPT

## 2020-03-18 PROCEDURE — 36600 WITHDRAWAL OF ARTERIAL BLOOD: CPT

## 2020-03-18 PROCEDURE — 94640 AIRWAY INHALATION TREATMENT: CPT

## 2020-03-18 PROCEDURE — 25010000002 VANCOMYCIN 5 G RECONSTITUTED SOLUTION 5,000 MG VIAL: Performed by: INTERNAL MEDICINE

## 2020-03-18 PROCEDURE — 82375 ASSAY CARBOXYHB QUANT: CPT

## 2020-03-18 PROCEDURE — 25010000002 CEFTRIAXONE PER 250 MG: Performed by: INTERNAL MEDICINE

## 2020-03-18 PROCEDURE — 85610 PROTHROMBIN TIME: CPT | Performed by: INTERNAL MEDICINE

## 2020-03-18 PROCEDURE — 93306 TTE W/DOPPLER COMPLETE: CPT

## 2020-03-18 PROCEDURE — C1751 CATH, INF, PER/CENT/MIDLINE: HCPCS

## 2020-03-18 PROCEDURE — 93306 TTE W/DOPPLER COMPLETE: CPT | Performed by: INTERNAL MEDICINE

## 2020-03-18 PROCEDURE — 82962 GLUCOSE BLOOD TEST: CPT

## 2020-03-18 PROCEDURE — 86140 C-REACTIVE PROTEIN: CPT | Performed by: INTERNAL MEDICINE

## 2020-03-18 PROCEDURE — 80053 COMPREHEN METABOLIC PANEL: CPT | Performed by: INTERNAL MEDICINE

## 2020-03-18 PROCEDURE — 25010000002 METHYLPREDNISOLONE PER 40 MG: Performed by: INTERNAL MEDICINE

## 2020-03-18 PROCEDURE — 83036 HEMOGLOBIN GLYCOSYLATED A1C: CPT | Performed by: INTERNAL MEDICINE

## 2020-03-18 PROCEDURE — 36410 VNPNXR 3YR/> PHY/QHP DX/THER: CPT

## 2020-03-18 RX ORDER — L.ACID,PARA/B.BIFIDUM/S.THERM 8B CELL
1 CAPSULE ORAL DAILY
Status: DISCONTINUED | OUTPATIENT
Start: 2020-03-18 | End: 2020-03-20 | Stop reason: HOSPADM

## 2020-03-18 RX ORDER — QUETIAPINE FUMARATE 100 MG/1
200 TABLET, FILM COATED ORAL NIGHTLY
Status: DISCONTINUED | OUTPATIENT
Start: 2020-03-18 | End: 2020-03-20 | Stop reason: HOSPADM

## 2020-03-18 RX ORDER — PANTOPRAZOLE SODIUM 40 MG/1
40 TABLET, DELAYED RELEASE ORAL 2 TIMES DAILY
Status: DISCONTINUED | OUTPATIENT
Start: 2020-03-18 | End: 2020-03-20 | Stop reason: HOSPADM

## 2020-03-18 RX ORDER — CLONAZEPAM 0.5 MG/1
0.5 TABLET ORAL 3 TIMES DAILY PRN
Status: DISCONTINUED | OUTPATIENT
Start: 2020-03-18 | End: 2020-03-20 | Stop reason: HOSPADM

## 2020-03-18 RX ORDER — SODIUM CHLORIDE 0.9 % (FLUSH) 0.9 %
10 SYRINGE (ML) INJECTION EVERY 12 HOURS SCHEDULED
Status: DISCONTINUED | OUTPATIENT
Start: 2020-03-18 | End: 2020-03-20 | Stop reason: HOSPADM

## 2020-03-18 RX ORDER — BUDESONIDE AND FORMOTEROL FUMARATE DIHYDRATE 160; 4.5 UG/1; UG/1
2 AEROSOL RESPIRATORY (INHALATION)
Status: DISCONTINUED | OUTPATIENT
Start: 2020-03-18 | End: 2020-03-20 | Stop reason: HOSPADM

## 2020-03-18 RX ORDER — PREGABALIN 75 MG/1
150 CAPSULE ORAL EVERY 12 HOURS SCHEDULED
Status: DISCONTINUED | OUTPATIENT
Start: 2020-03-18 | End: 2020-03-20 | Stop reason: HOSPADM

## 2020-03-18 RX ORDER — METOPROLOL SUCCINATE 50 MG/1
50 TABLET, EXTENDED RELEASE ORAL DAILY
Status: DISCONTINUED | OUTPATIENT
Start: 2020-03-18 | End: 2020-03-20 | Stop reason: HOSPADM

## 2020-03-18 RX ORDER — MULTIVITAMIN
1 TABLET ORAL NIGHTLY
Status: DISCONTINUED | OUTPATIENT
Start: 2020-03-18 | End: 2020-03-20 | Stop reason: HOSPADM

## 2020-03-18 RX ORDER — ATORVASTATIN CALCIUM 40 MG/1
40 TABLET, FILM COATED ORAL NIGHTLY
Status: DISCONTINUED | OUTPATIENT
Start: 2020-03-18 | End: 2020-03-18

## 2020-03-18 RX ORDER — ALBUTEROL SULFATE 2.5 MG/3ML
2.5 SOLUTION RESPIRATORY (INHALATION) EVERY 4 HOURS PRN
Status: DISCONTINUED | OUTPATIENT
Start: 2020-03-18 | End: 2020-03-20 | Stop reason: HOSPADM

## 2020-03-18 RX ORDER — DULOXETIN HYDROCHLORIDE 60 MG/1
60 CAPSULE, DELAYED RELEASE ORAL 2 TIMES DAILY
Status: DISCONTINUED | OUTPATIENT
Start: 2020-03-18 | End: 2020-03-20 | Stop reason: HOSPADM

## 2020-03-18 RX ORDER — RANOLAZINE 500 MG/1
500 TABLET, EXTENDED RELEASE ORAL EVERY 12 HOURS SCHEDULED
Status: DISCONTINUED | OUTPATIENT
Start: 2020-03-18 | End: 2020-03-20 | Stop reason: HOSPADM

## 2020-03-18 RX ORDER — SODIUM CHLORIDE 0.9 % (FLUSH) 0.9 %
10 SYRINGE (ML) INJECTION AS NEEDED
Status: DISCONTINUED | OUTPATIENT
Start: 2020-03-18 | End: 2020-03-20 | Stop reason: HOSPADM

## 2020-03-18 RX ADMIN — SODIUM CHLORIDE, PRESERVATIVE FREE 10 ML: 5 INJECTION INTRAVENOUS at 19:35

## 2020-03-18 RX ADMIN — RANOLAZINE 500 MG: 500 TABLET, FILM COATED, EXTENDED RELEASE ORAL at 08:35

## 2020-03-18 RX ADMIN — PANTOPRAZOLE SODIUM 40 MG: 40 TABLET, DELAYED RELEASE ORAL at 19:34

## 2020-03-18 RX ADMIN — DOXYCYCLINE 100 MG: 100 INJECTION, POWDER, LYOPHILIZED, FOR SOLUTION INTRAVENOUS at 19:36

## 2020-03-18 RX ADMIN — METHYLPREDNISOLONE SODIUM SUCCINATE 40 MG: 40 INJECTION, POWDER, FOR SOLUTION INTRAMUSCULAR; INTRAVENOUS at 05:26

## 2020-03-18 RX ADMIN — QUETIAPINE FUMARATE 200 MG: 100 TABLET ORAL at 19:34

## 2020-03-18 RX ADMIN — METOPROLOL SUCCINATE 50 MG: 50 TABLET, EXTENDED RELEASE ORAL at 08:36

## 2020-03-18 RX ADMIN — ESTROGENS, CONJUGATED 0.6 MG: 0.3 TABLET, FILM COATED ORAL at 08:35

## 2020-03-18 RX ADMIN — OSELTAMIVIR PHOSPHATE 75 MG: 75 CAPSULE ORAL at 08:35

## 2020-03-18 RX ADMIN — ACETAMINOPHEN 650 MG: 325 TABLET ORAL at 09:19

## 2020-03-18 RX ADMIN — SODIUM CHLORIDE, PRESERVATIVE FREE 10 ML: 5 INJECTION INTRAVENOUS at 08:37

## 2020-03-18 RX ADMIN — VANCOMYCIN HYDROCHLORIDE 1250 MG: 5 INJECTION, POWDER, LYOPHILIZED, FOR SOLUTION INTRAVENOUS at 01:59

## 2020-03-18 RX ADMIN — METHYLPREDNISOLONE SODIUM SUCCINATE 40 MG: 40 INJECTION, POWDER, FOR SOLUTION INTRAMUSCULAR; INTRAVENOUS at 19:33

## 2020-03-18 RX ADMIN — RANOLAZINE 500 MG: 500 TABLET, FILM COATED, EXTENDED RELEASE ORAL at 19:34

## 2020-03-18 RX ADMIN — DULOXETINE HYDROCHLORIDE 60 MG: 60 CAPSULE, DELAYED RELEASE ORAL at 19:35

## 2020-03-18 RX ADMIN — IPRATROPIUM BROMIDE 0.5 MG: 0.5 SOLUTION RESPIRATORY (INHALATION) at 18:43

## 2020-03-18 RX ADMIN — OSELTAMIVIR PHOSPHATE 75 MG: 75 CAPSULE ORAL at 19:33

## 2020-03-18 RX ADMIN — PANTOPRAZOLE SODIUM 40 MG: 40 TABLET, DELAYED RELEASE ORAL at 08:35

## 2020-03-18 RX ADMIN — BUDESONIDE AND FORMOTEROL FUMARATE DIHYDRATE 2 PUFF: 160; 4.5 AEROSOL RESPIRATORY (INHALATION) at 22:13

## 2020-03-18 RX ADMIN — CEFTRIAXONE 2 G: 2 INJECTION, POWDER, FOR SOLUTION INTRAMUSCULAR; INTRAVENOUS at 08:38

## 2020-03-18 RX ADMIN — DOXYCYCLINE 100 MG: 100 INJECTION, POWDER, LYOPHILIZED, FOR SOLUTION INTRAVENOUS at 14:09

## 2020-03-18 RX ADMIN — ACETAMINOPHEN 650 MG: 325 TABLET ORAL at 19:33

## 2020-03-18 RX ADMIN — BUDESONIDE AND FORMOTEROL FUMARATE DIHYDRATE 2 PUFF: 160; 4.5 AEROSOL RESPIRATORY (INHALATION) at 13:14

## 2020-03-18 RX ADMIN — Medication 1 CAPSULE: at 08:36

## 2020-03-18 RX ADMIN — Medication 1 TABLET: at 19:34

## 2020-03-18 RX ADMIN — IPRATROPIUM BROMIDE AND ALBUTEROL SULFATE 3 ML: .5; 3 SOLUTION RESPIRATORY (INHALATION) at 13:15

## 2020-03-18 RX ADMIN — PREGABALIN 150 MG: 75 CAPSULE ORAL at 19:34

## 2020-03-18 RX ADMIN — CLONAZEPAM 0.5 MG: 0.5 TABLET ORAL at 22:07

## 2020-03-18 RX ADMIN — DULOXETINE HYDROCHLORIDE 60 MG: 60 CAPSULE, DELAYED RELEASE ORAL at 08:35

## 2020-03-18 RX ADMIN — DEXTROMETHORPHAN HYDROBROMIDE AND GUAIFENESIN 5 ML: 10; 100 LIQUID ORAL at 19:50

## 2020-03-18 RX ADMIN — PREGABALIN 150 MG: 75 CAPSULE ORAL at 08:36

## 2020-03-18 NOTE — PLAN OF CARE
Problem: Patient Care Overview  Goal: Plan of Care Review  Outcome: Ongoing (interventions implemented as appropriate)  Flowsheets (Taken 3/18/2020 1700)  Progress: improving  Outcome Summary: VSS. Pt remains on 2LNC. Ambulating around room without difficulty. Midline placed today. C/o Headache, PRN tylenol given. Will be transferred to [address] [place] [address] today.

## 2020-03-18 NOTE — PAYOR COMM NOTE
"[facility]  NPI:[id number]    Utilization Review  Contact: Geneva Bee RN  Phone: [phone]  Fax:[phone]    INITIATE INPATIENT AUTHORIZATION    PENDING AUTH # [id number]        Tiki Campos (50 y.o. Female)     Date of Birth Social Security Number Address Home Phone MRN    1970  [address]  [place] [address] [phone] [id number]    Orthodoxy Marital Status          Yarsanism        Admission Date Admission Type Admitting Provider Attending Provider Department, Room/Bed    3/17/20 Emergency Marcelino Dunaway MD Heinss, Karl F, MD [facility] CARE, P203/S2    Discharge Date Discharge Disposition Discharge Destination                       Attending Provider:  Marcelino Dunaway MD    Allergies:  Morphine    Isolation:  Droplet   Infection:  Influenza (20)   Code Status:  CPR    Ht:  160 cm (63\")   Wt:  141 kg (311 lb 6.4 oz)    Admission Cmt:  None   Principal Problem:  None                Active Insurance as of 3/17/2020     Primary Coverage     Payor Plan Insurance Group Employer/Plan Group    [employer]HEM BLUE CROSS ANTHEM BLUE CROSS BLUE SHIELD PPO [id number]     Payor Plan Address Payor Plan Phone Number Payor Plan Fax Number Effective Dates    [address] [phone]  2019 - None Entered    [place] [address]       Subscriber Name Subscriber Birth Date Member ID       MANE CAMPOS 1970 [id number]                 Emergency Contacts      (Rel.) Home Phone Work Phone Mobile Phone    LORE KEARNS (Sister) [phone] -- [phone]               History & Physical      OBrandy Ibarra PA at 20 1336     Attestation signed by Marcelino Dunaway MD at 20 [phone]    See my separate note, agree with H&P                       [facility] Medicine Services  HISTORY & PHYSICAL    Patient Identification:  Name:  Tiki Campos  Age:  50 y.o.  Sex:  female  :  1970  MRN:  " [id number]   Visit Number:  [id number]  Primary Care Physician:  Bartolome Recinos MD     Subjective     Chief complaint:   Chief Complaint   Patient presents with   • Shortness of Breath   • Cough     History of presenting illness:   Patient is a 50 y.o. female with past medical history significant for insulin dependent diabetes mellitus, essential hypertension, hyperlipidemia, peripheral neuropathy, GERD, anxiety/depression, and morbid obesity that presented to the [facility] emergency department for evaluation of cough, congestion, and shortness of breath.  Patient states onset of symptoms was sudden yesterday.  Patient states she had frequent cough that is dry, chest congestion, and fever of 101.  Patient states that she has had worsening headache refractory to Tylenol.  She states that the headache is secondary to coughing so frequently.  She denies any vision disturbances, she does have some mild photophobia.  Patient denies any chest pain or radiating chest pain.  She does have progressively worsening shortness of breath, worse with exertion.  She does not utilize home oxygen therapy.  She does report nausea without emesis.  Denies any urinary symptoms.  She denies any recent travel or ill contacts.  She did have a recent visit to the hospital in outpatient setting for spinal x-rays on 3/12/2020.  She has not had any recent travel, no travel to any COVID-19 endemic areas, no recent exposure to any PUI or persons confirmed to have COVID 19.      Upon arrival to the ED, vitals were temperature 100.3, heart rate 106, respiratory rate 28, blood pressure 106/72, and oxygen saturation 93% on room air.  Initial ABG with pH 7.385, PCO2 46.7, PO2 52.4, HCO3 27.9, and oxygen saturation 87.8% on room air.  Troponin T negative, proBNP within normal limits.  CMP with glucose 167, calcium 8.5, and AST 63 otherwise unremarkable.  Initial lactic acid 2.4.  CBC with .7, lymphocyte percentage decreased  at 10.1%, and absolute lymphocyte count decreased to 0.49, otherwise unremarkable.  Urinalysis with dark yellow appearance, trace ketones, 3+ blood, trace leukocytes, trace protein, too numerous to count RBC, and 36 squamous epithelial cells.  Respiratory panel positive for H1N1 influenza A 2009.  Chest x-ray with no evidence of acute cardiopulmonary disease.  CT abdomen pelvis with stone protocol with right lower lobe atypical pneumonia, liver cirrhosis, otherwise stable with no acute findings.  CT chest with contrast bilateral right greater than left centrilobular nodularity most consistent with bilateral atypical pneumonia, probable reactive borderline adenopathy of the right hilum and mediastinum, no bulky adenopathy noted.  There is mild cardiomegaly and liver cirrhosis, no other acute findings. While in the ED, patient was administered 1000 mg PO tylenol X 1, 650 mg PO tylenol X 1, rocephin, doxycycline, Robitussin DM 5 mL, DuoNeb nebulizer treatment, 15 mg IV Toradol, and 75 mg PO Tamiflu.     Patient has been admitted to the progressive care unit for further evaluation and treatment.     Present during exam: N/A     COVID-19 RISK SCREEN:  1. Has the patient been exposed to a known COVID-19 (+) person(s) or a person who is considered to be a Person Under Investigation (PUI) for COVID-19 infection? No  2. Has the patient traveled to or are they from an endemic country or high-risk local area? No   3. Does the patient have baseline higher exposure risk such as working in the healthcare field? No      ---------------------------------------------------------------------------------------------------------------------   Review of Systems   Constitutional: Positive for activity change, chills, fatigue and fever. Negative for appetite change and diaphoresis.   HENT: Negative for congestion, postnasal drip, rhinorrhea, sinus pain, sore throat and trouble swallowing.    Eyes: Negative for discharge and visual  disturbance.   Respiratory: Positive for cough and shortness of breath. Negative for chest tightness and wheezing.    Cardiovascular: Negative for chest pain, palpitations and leg swelling.   Gastrointestinal: Positive for nausea. Negative for abdominal pain, constipation, diarrhea and vomiting.   Endocrine: Negative for cold intolerance and heat intolerance.   Genitourinary: Negative for decreased urine volume, dysuria, frequency and urgency.   Musculoskeletal: Negative for arthralgias, gait problem and myalgias.   Skin: Negative for color change, rash and wound.   Allergic/Immunologic: Negative for environmental allergies and immunocompromised state.   Neurological: Positive for weakness and headaches (states secondary to coughing). Negative for dizziness, syncope and light-headedness.   Hematological: Negative for adenopathy. Does not bruise/bleed easily.   Psychiatric/Behavioral: Negative for confusion and decreased concentration. The patient is not nervous/anxious.       ---------------------------------------------------------------------------------------------------------------------   Past Medical History:   Diagnosis Date   • Anxiety    • Depression    • Diabetes mellitus (CMS/[facility])    • Hyperlipidemia    • Hypertension      Past Surgical History:   Procedure Laterality Date   • APPENDECTOMY     • CHOLECYSTECTOMY     • HYSTERECTOMY     • TONSILLECTOMY       History reviewed. No pertinent family history.  Social History     Socioeconomic History   • Marital status:      Spouse name: Not on file   • Number of children: Not on file   • Years of education: Not on file   • Highest education level: Not on file   Tobacco Use   • Smoking status: Former Smoker     Packs/day: 0.50     ---------------------------------------------------------------------------------------------------------------------   Allergies:   Morphine  ---------------------------------------------------------------------------------------------------------------------   Medications below are reported home medications pulling from within the system; at this time, these medications have not been reconciled unless otherwise specified and are in the verification process for further verifcation as current home medications.    Prior to Admission Medications     Prescriptions Last Dose Informant Patient Reported? Taking?    albuterol sulfate  (90 Base) MCG/ACT inhaler  Pharmacy Yes No    Inhale 2 puffs Every 4 (Four) Hours As Needed for Wheezing.    atorvastatin (LIPITOR) 40 MG tablet  Pharmacy Yes No    Take 40 mg by mouth Daily.    clonazePAM (KlonoPIN) 0.5 MG tablet  Self Yes No    Take 0.5 mg by mouth 3 (Three) Times a Day.    coenzyme Q10 100 MG capsule  Self Yes No    Take 200 mg by mouth 2 (Two) Times a Day.    DULoxetine (CYMBALTA) 60 MG capsule  Pharmacy Yes No    Take 60 mg by mouth 2 (Two) Times a Day.    estrogens, conjugated, (PREMARIN) 0.625 MG tablet  Pharmacy Yes No    Take 0.625 mg by mouth Daily.    Evening Primrose Oil 1000 MG capsule  Self Yes No    Take 1 capsule by mouth Every Night.    insulin regular (HumuLIN R) 500 UNIT/ML patient supplied pump  Pharmacy Yes No    Inject  under the skin into the appropriate area as directed Continuous. Prior to [facility] Admission, Patient was on: basal 2.5u per hour    lactobacillus acidophilus (RISAQUAD) capsule capsule  Self Yes No    Take 1 capsule by mouth Daily.    multivitamin (DAILY TERRI) tablet tablet  Self Yes No    Take 1 tablet by mouth Every Night.    pantoprazole (PROTONIX) 40 MG EC tablet  Pharmacy Yes No    Take 40 mg by mouth 2 (Two) Times a Day.    pregabalin (LYRICA) 150 MG capsule  Pharmacy Yes No    Take 150 mg by mouth 2 (Two) Times a Day.    QUEtiapine (SEROquel) 300 MG tablet  Pharmacy Yes No    Take 150 mg by mouth Every Night.    ranolazine (RANEXA) 500 MG 12 hr tablet   Pharmacy Yes No    Take 500 mg by mouth 2 (Two) Times a Day.    vitamin E 400 UNIT capsule  Self Yes No    Take 400 Units by mouth Daily.        ---------------------------------------------------------------------------------------------------------------------    Objective     Hospital Scheduled Meds:          Current listed hospital scheduled medications may not yet reflect those currently placed in orders that are signed and held, awaiting patient's arrival to floor/unit.    ---------------------------------------------------------------------------------------------------------------------   Vital Signs:  Temp:  [99.4 °F (37.4 °C)-100.3 °F (37.9 °C)] 99.4 °F (37.4 °C)  Heart Rate:  [] 89  Resp:  [20-28] 20  BP: (106-150)/(72-95) 143/82  No data found.  SpO2 Percentage    03/17/20 1200 03/17/20 1300 03/17/20 1330   SpO2: 96% 100% 97%     SpO2:  [90 %-100 %] 97 %  on  Flow (L/min):  [2] 2;   Device (Oxygen Therapy): nasal cannula    Body mass index is 53.85 kg/m².  Wt Readings from Last 3 Encounters:   03/17/20 (!) 138 kg (304 lb)   02/12/20 (!) 144 kg (318 lb 3.2 oz)       ---------------------------------------------------------------------------------------------------------------------   Physical Exam:  Physical Exam   Constitutional: She is oriented to person, place, and time. She appears well-developed and well-nourished.  Non-toxic appearance. She appears ill. No distress. Nasal cannula in place.   Sitting up on edge of bed, appears to be in mild acute respiratory distress, appears ill feeling.   HENT:   Head: Normocephalic and atraumatic.   Right Ear: External ear normal.   Left Ear: External ear normal.   Nose: Nose normal.   Mouth/Throat: Oropharynx is clear and moist and mucous membranes are normal. No oropharyngeal exudate.   Eyes: Pupils are equal, round, and reactive to light. Conjunctivae are normal. No scleral icterus.   Neck: Normal range of motion. Neck supple. No neck rigidity.    Cardiovascular: Normal rate, regular rhythm, normal heart sounds and intact distal pulses. Exam reveals no gallop and no friction rub.   No murmur heard.  Pulses:       Dorsalis pedis pulses are 2+ on the right side, and 2+ on the left side.        Posterior tibial pulses are 2+ on the right side, and 2+ on the left side.   Pulmonary/Chest: Effort normal. No accessory muscle usage. Tachypnea noted. No respiratory distress. She has decreased breath sounds. She has wheezes (End expiratory). She has no rhonchi. She has no rales. She exhibits no tenderness.   Speaks very short sentences.  Tachypneic.  Nasal cannula.   Abdominal: Soft. Bowel sounds are normal. She exhibits no distension. There is no tenderness. There is no rebound and no guarding.   Obese   Genitourinary:   Genitourinary Comments: No Lozoay catheter in place.   Musculoskeletal: She exhibits no edema, tenderness or deformity.        Right lower leg: She exhibits no swelling.        Left lower leg: She exhibits no swelling.     Vascular Status -  Her right foot exhibits normal foot vasculature  and no edema. Her left foot exhibits normal foot vasculature  and no edema.  Neurological: She is alert and oriented to person, place, and time. She has normal strength. No cranial nerve deficit or sensory deficit.   Awake and alert.  Follows commands.  Answers questions verbally.  Moves all extremities equally.  Strength and sensation intact.  No focal neurological deficits on exam.  Patient noted to ambulate by herself as I was entering the room with no issues.   Skin: Skin is warm and dry. Capillary refill takes less than 2 seconds. No rash noted. No erythema. No pallor.   Psychiatric: She has a normal mood and affect. Her speech is normal and behavior is normal. Judgment and thought content normal. Cognition and memory are normal.   Nursing note and vitals  reviewed.    ---------------------------------------------------------------------------------------------------------------------  EKG: Pending cardiology read.  Per my view, heart rate 108 with sinus tachycardia,  ms, there is some T wave inversion inferiorly.    Telemetry:    Sinus 90s    I have personally reviewed the EKG/Telemetry strip  ---------------------------------------------------------------------------------------------------------------------   Results from last 7 days   Lab Units 03/17/20  0923   TROPONIN T ng/mL <0.010     Results from last 7 days   Lab Units 03/17/20  0923   PROBNP pg/mL 203.1       Results from last 7 days   Lab Units 03/17/20  0911   PH, ARTERIAL pH units 7.385   PO2 ART mm Hg 52.4*   PCO2, ARTERIAL mm Hg 46.7*   HCO3 ART mmol/L 27.9*     Results from last 7 days   Lab Units 03/17/20  0923   LACTATE mmol/L 2.4*   WBC 10*3/mm3 4.83   HEMOGLOBIN g/dL 13.8   HEMATOCRIT % 42.5   MCV fL 100.7*   MCHC g/dL 32.5   PLATELETS 10*3/mm3 79*     Results from last 7 days   Lab Units 03/17/20  0923   SODIUM mmol/L 138   POTASSIUM mmol/L 4.1   CHLORIDE mmol/L 100   CO2 mmol/L 24.9   BUN mg/dL 8   CREATININE mg/dL 0.77   EGFR IF NONAFRICN AM mL/min/1.73 80   CALCIUM mg/dL 8.5*   GLUCOSE mg/dL 167*   ALBUMIN g/dL 3.59   BILIRUBIN mg/dL 0.7   ALK PHOS U/L 90   AST (SGOT) U/L 63*   ALT (SGPT) U/L 28   Estimated Creatinine Clearance: 120.8 mL/min (by C-G formula based on SCr of 0.77 mg/dL).    Lab Results   Component Value Date    HGBA1C 8.60 (H) 02/10/2020     Lab Results   Component Value Date    TSH 2.370 02/10/2020     Microbiology Results (last 10 days)     Procedure Component Value - Date/Time    Respiratory Panel, PCR - Swab, Nasopharynx [[id number]]  (Abnormal) Collected:  03/17/20 0921    Lab Status:  Final result Specimen:  Swab from Nasopharynx Updated:  03/17/20 1050     ADENOVIRUS, PCR Not Detected     Coronavirus 229E Not Detected     Coronavirus HKU1 Not Detected      Coronavirus NL63 Not Detected     Coronavirus OC43 Not Detected     Human Metapneumovirus Not Detected     Human Rhinovirus/Enterovirus Not Detected     Influenza B PCR Not Detected     Parainfluenza Virus 1 Not Detected     Parainfluenza Virus 2 Not Detected     Parainfluenza Virus 3 Not Detected     Parainfluenza Virus 4 Not Detected     Bordetella pertussis pcr Not Detected     Influenza A H1 2009 PCR Detected     Chlamydophila pneumoniae PCR Not Detected     Mycoplasma pneumo by PCR Not Detected     Influenza A PCR Not Detected     Influenza A H3 Not Detected     Influenza A H1 Not Detected     RSV, PCR Not Detected    Narrative:       The coronavirus on the RVP is NOT COVID-19 and is NOT indicative of infection with COVID-19.          I have personally reviewed the above laboratory results.   ---------------------------------------------------------------------------------------------------------------------  Imaging Results (Last 7 Days)     Procedure Component Value Units Date/Time    XR Chest 1 View [[id number]] Collected:  03/17/20 1011     Updated:  03/17/20 1156    Narrative:       EXAMINATION: XR CHEST 1 VW-      CLINICAL INDICATION:     cough, sob     TECHNIQUE:  XR CHEST 1 VW-      COMPARISON: 10/10/2015      FINDINGS:      Lungs are aerated.   Heart size, mediastinum, and pulmonary vascularity are unremarkable.   No pneumothorax.   No effusions.   No acute osseous findings.          Impression:       No radiographic evidence of acute cardiac or pulmonary  disease.     This report was finalized on 3/17/2020 10:19 AM by Dr. Mika Vasquez MD.       CT Abdomen Pelvis Stone Protocol [[id number]] Collected:  03/17/20 1150     Updated:  03/17/20 1156    Narrative:       EXAM: CT ABDOMEN PELVIS STONE PROTOCOL-            TECHNIQUE: Multiple axial CT images were obtained from lung bases  through pubic symphysis WITHOUT administration of IV contrast.  Reformatted images in the coronal and/or sagittal plane(s)  were  generated from the axial data set to facilitate diagnostic accuracy  and/or surgical planning.  Oral Contrast:NONE.     Radiation dose reduction techniques were utilized per ALARA protocol.  Automated exposure control was initiated through either or [employer] or  [employer] software packages by  protocol.       DOSE:     Clinical information  Hematuria      Comparison  Comparison: 02/10/2020     Findings  LOWER THORAX: STABLE MILD CARDIOMEGALY. CENTRILOBULAR NODULES RIGHT  LOWER LOBE COMPATIBLE WITH ATYPICAL PNEUMONIA. NO CONSOLIDATIVE  PNEUMONIA IDENTIFIED.     ABDOMEN:        LIVER: LIVER CIRRHOSIS CHANGES ARE AGAIN NOTED WITH NO FOCAL LIVER  LESION OR PERIHEPATIC ASCITES IDENTIFIED.        GALLBLADDER: CHOLECYSTECTOMY.        PANCREAS: Unremarkable. No mass or ductal dilatation.        SPLEEN: BORDERLINE ENLARGEMENT OF THE SPLEEN IS STABLE.        ADRENALS: No mass.        KIDNEYS/URETERS: NO RENAL OR URETERAL STONES OR HYDRONEPHROSIS NOTED.        GI TRACT: Non-dilated. No definite wall thickening.        PERITONEUM: No free air. No free fluid or loculated fluid  collections.        MESENTERY: Unremarkable.        LYMPH NODES: PROMINENT AND LIKELY REACTIVE MILDLY ENLARGED CIERA  HEPATIS AND UPPER ABDOMINAL LYMPH NODES.        VASCULATURE: STABLE MILD ATHEROSCLEROSIS.        ABDOMINAL WALL: No focal hernia or mass.           OTHER: None.     PELVIS:        BLADDER: URINARY BLADDER IS INCOMPLETELY DISTENDED BUT IS OTHERWISE  UNREMARKABLE IN APPEARANCE.        REPRODUCTIVE: HYSTERECTOMY.        APPENDIX: APPENDECTOMY.     BONES: DEGENERATIVE CHANGES LUMBAR SPINE BUT NO ACUTE BONY FINDINGS  IDENTIFIED.       Impression:       Impression:  1. Right lower lobe atypical pneumonia.  2. Liver cirrhosis. No significant ascites.  3. No renal or ureteral stones. No hydronephrosis.  4. Other nonacute/incidental findings as above.     This report was finalized on 3/17/2020 11:52 AM by Dr. Mika Vasquez MD.        CT Chest With Contrast [[id number]] Collected:  03/17/20 1152     Updated:  03/17/20 1156    Narrative:       EXAM: CT CHEST W CONTRAST-            CLINICAL INDICATION:cough, sob      COMPARISON: NONE.     TECHNIQUE: Multiple axial CT images were obtained from lung apex through  upper abdomen WITH administration of IV contrast. Reformatted images in  the coronal and/or sagittal plane(s) were generated from the axial data  set to facilitate diagnostic accuracy and/or surgical planning.     Radiation dose reduction techniques were utilized per ALARA protocol.  Automated exposure control was initiated through either or [employer] or  [employer] software packages by  protocol.    DOSE (DLP mGy-cm):        FINDINGS:     LUNGS: CENTRILOBULAR NODULES OF THE RIGHT UPPER LOBE AND RIGHT LOWER  LOBE AND MINIMAL CENTRILOBULAR NODULES IN THE LEFT LOWER LOBE SUPERIOR  SEGMENT MOST CONSISTENT WITH RIGHT GREATER THAN LEFT BILATERAL ATYPICAL  PNEUMONIA. NO LOBAR CONSOLIDATION IDENTIFIED.     HEART: MILD CARDIOMEGALY IS NOTED.     PERICARDIUM: No effusion.     MEDIASTINUM: PROBABLE REACTIVE LYMPH NODES IN THE RIGHT HILUM AND  MEDIASTINUM BUT NO BULKY ADENOPATHY IDENTIFIED.     PLEURA: No pleural effusion. No pleural mass or abnormal calcification.  No pneumothorax.     MAJOR AIRWAYS: Clear. No intrinsic mass.     VASCULATURE: No evidence of aneurysm.     VISUALIZED UPPER ABDOMEN:LIVER CIRRHOSIS AND MILD SPLENOMEGALY. PRIOR  CHOLECYSTECTOMY.     OTHER: None.     BONES: DEGENERATIVE CHANGES THORACIC SPINE BUT NO ACUTE BONY FINDINGS  IDENTIFIED.       Impression:       1. Bilateral right greater than left centrilobular nodularity as  detailed above most consistent with bilateral atypical pneumonia.  2. Probable reactive borderline adenopathy of the right hilum and  mediastinum. No bulky adenopathy identified.  3. Mild cardiomegaly.  4. Liver cirrhosis. Other nonacute findings as above.     This report was finalized on  3/17/2020 11:53 AM by Dr. Mika Vasquez MD.           I have personally reviewed the above radiology results.   ---------------------------------------------------------------------------------------------------------------------    Assessment & Plan        · Severe sepsis, present on admission, secondary to bilateral community acquired pneumonia with acute H1N1 Influenza A 2009: Patient met severe sepsis criteria with HR > 90, RR > 20, lactic acid > 2, respiratory failure, and evidence of pneumonia on radiological imaging. Blood cultures obtained in ED, add respiratory culture and follow for final results. Respiratory panel PCR positive for H1N1 influenza 2009. Continue Tamiflu course. Empirically treat concomitant pneumonia with IV rocephin and pharmacy to dose vancomycin for MRSA coverage in setting of influenza virus. Obtain MRSA and legionella testing as well as rapid strep. Nebs. Supp O2 PRN to titrate SpO2 > 90%. Closely monitor vitals and temperature curve, PRN tylenol.   · Acute hypoxic and mild hypercarbic respiratory failure: Treatment as outlined above. Supplemental O2 as necessary to titrate SPO2 > 90%. Pulmonary consult, input/assistance is much appreciated.  · Headache: Obtain noncontrasted head CT  · Thrombocytopenia: Appears chronic and slightly worse than normal.  No active bleeding.  Monitor closely, repeat CBC in a.m.  · Insulin dependent type II diabetes mellitus: Obtain HgbA1C. Patient on insulin pump at home, hold for now. SSI. Closely monitor blood glucose levels with accuchecks QAC and QHS. Titrate insulin therapy as necessary.   · Essential hypertension: BP controlled. Resume home antihypertensive regimen once reconciled per pharamacy. Closely monitor BP per hospital protocol, titrate medications as necessary.   · Hyperlipidemia: Continue home statin   · GERD: PPI  · Peripheral neuropathy: Resume home Lyrica once dose confirmed per pharmacy, obtain MAN  · Anxiety/depression: Supportive  care. Resume home meds once reconciled per pharmacy.   · Former smoker  · Morbid obesity, BMI 53.85  · F/E/N: No IV fluids. Replace electrolytes per protocol as necessary. Consistent carbohydrate diet.    ---------------------------------------------------  DVT Prophylaxis: SQ heparin   GI Prophylaxis: PPI  Activity: Up with assistance as tolerated.    The patient is considered to be a high risk patient due to: Severe sepsis, pneumonia, H1N1 influenza A, respiratory failure, multiple co morbidities effects all aspects of care, and morbid obesity    INPATIENT status due to the need for care which can only be reasonably provided in an hospital setting such as aggressive/expedited ancillary services and/or consultation services, the necessity for IV medications, close physician monitoring and/or the possible need for procedures.  In such, I feel patient’s risk for adverse outcomes and need for care warrant INPATIENT evaluation and predict the patient’s care encounter to likely last beyond 2 midnights.    Code Status: FULL CODE    I have discussed the patient's assessment and plan with the patient and nursing staff.      Brandy Solis PA-C  Hospitalist Service -- [facility]   Pager: [phone]    03/17/20  13:36    Attending Physician: Marcelino Dunaway MD      ---------------------------------------------------------------------------------------------------------------------          Electronically signed by Marcelino Dunaway MD at 03/17/20 3091          Emergency Department Notes      Quinotn Deras PA at 03/17/20 3816     Attestation signed by Johny Arenas MD at 03/17/20 6425          For this patient encounter, I reviewed the NP or PA documentation, treatment plan, and medical decision making. Johny Arenas MD 3/17/2020 18:16                  Subjective   50-year-old white female presents secondary to cough congestion shortness of breath.  She states her symptoms started yesterday.  She  states that Sunday she may have had a mild cough.  This hit suddenly.  She has had a headache.  She does report body aches.  EMS picked her up.  Upon arrival she had an oxygen saturation of 88%.  She does not wear oxygen.  She denies any history of respiratory disease.  She is a former smoker.  She states that she has been wheezing.          Review of Systems   Constitutional: Positive for fever.   HENT: Negative.    Respiratory: Positive for cough, shortness of breath and wheezing.    Cardiovascular: Negative.  Negative for chest pain.   Gastrointestinal: Negative.  Negative for abdominal pain.   Endocrine: Negative.    Genitourinary: Negative.  Negative for dysuria.   Skin: Negative.    Neurological: Negative.    Psychiatric/Behavioral: Negative.    All other systems reviewed and are negative.      Past Medical History:   Diagnosis Date   • Anxiety    • Depression    • Diabetes mellitus (CMS/HCC)    • Hyperlipidemia    • Hypertension        Allergies   Allergen Reactions   • Morphine Hives       Past Surgical History:   Procedure Laterality Date   • APPENDECTOMY     • CHOLECYSTECTOMY     • HYSTERECTOMY     • TONSILLECTOMY         History reviewed. No pertinent family history.    Social History     Socioeconomic History   • Marital status:      Spouse name: Not on file   • Number of children: Not on file   • Years of education: Not on file   • Highest education level: Not on file   Tobacco Use   • Smoking status: Former Smoker     Packs/day: 0.50           Objective   Physical Exam   Constitutional: She is oriented to person, place, and time. She appears well-developed and well-nourished. No distress.   HENT:   Head: Normocephalic and atraumatic.   Right Ear: External ear normal.   Left Ear: External ear normal.   Nose: Nose normal.   Eyes: Pupils are equal, round, and reactive to light. Conjunctivae and EOM are normal.   Neck: Normal range of motion. Neck supple. No JVD present. No tracheal deviation  present.   Cardiovascular: Normal rate, regular rhythm and normal heart sounds.   No murmur heard.  Pulmonary/Chest: Effort normal and breath sounds normal. No respiratory distress. She has no wheezes.   Abdominal: Soft. Bowel sounds are normal. There is no tenderness.   Musculoskeletal: Normal range of motion. She exhibits no edema or deformity.   Neurological: She is alert and oriented to person, place, and time. No cranial nerve deficit.   Skin: Skin is warm and dry. No rash noted. She is not diaphoretic. No erythema. No pallor.   Psychiatric: She has a normal mood and affect. Her behavior is normal. Thought content normal.   Nursing note and vitals reviewed.      Procedures          ED Course  ED Course as of Mar 17 1344   Tue Mar 17, 2020   1248 I saw this patient, and agree with Burt's plan of care.    [CM]      ED Course User Index  [CM] Johny Arenas MD                                           MDM  Number of Diagnoses or Management Options  Influenza A (H1N1): new and requires workup  Pneumonia of both lungs due to infectious organism, unspecified part of lung: new and requires workup     Amount and/or Complexity of Data Reviewed  Clinical lab tests: reviewed and ordered  Tests in the radiology section of CPT®:  reviewed and ordered  Tests in the medicine section of CPT®:  reviewed and ordered  Decide to obtain previous medical records or to obtain history from someone other than the patient: yes  Discuss the patient with other providers: yes  Independent visualization of images, tracings, or specimens: yes    Risk of Complications, Morbidity, and/or Mortality  Presenting problems: moderate        Final diagnoses:   Influenza A (H1N1)   Pneumonia of both lungs due to infectious organism, unspecified part of lung            Quinton Deras PA  03/17/20 1340      Electronically signed by Johny Arenas MD at 03/17/20 9158     Ashutosh Valerio, RN at 03/17/20 9155        Falls risk bracelet placed  on left wrist     Ashutosh Valerio RN  03/17/20 0847      Electronically signed by Ashutosh Valerio RN at 03/17/20 0847     Chioma Dan RN at 03/17/20 0930        Received report from Ashutosh PATRICK, introduce myself to the pt, pt has no sign of distress, vs stable. No request at this time.     Chioma Dan RN  03/17/20 1125      Electronically signed by Chioma Dan RN at 03/17/20 1125     Chioma Dan RN at 03/17/20 1028        Pt signed consent with contrast at this time, verbalized understand the procedure.      Chioma Dan RN  03/17/20 1029      Electronically signed by Chioma Dan RN at 03/17/20 1029     Chioma Dan RN at 03/17/20 1530        Pt leaving ED with ED nurses, vs stable, no sign of distress, IV patent.     Chioma Dan RN  03/17/20 1538      Electronically signed by Chioma Dan RN at 03/17/20 1538       Vital Signs (last day)     Date/Time   Temp   Temp src   Pulse   Resp   BP   Patient Position   SpO2    03/18/20 0835   --   --   90   --   134/68   --   --    03/18/20 0830   98.5 (36.9)   Oral   --   --   --   --   --    03/18/20 0627   --   --   --   --   --   --   96    03/18/20 0527   97.8 (36.6)   Oral   92   --   --   --   --    03/18/20 0300   --   --   98   20   144/94   Lying   97    03/18/20 0205   --   --   97   20   121/64   --   97    03/18/20 0201   99.3 (37.4)   Oral   101   --   --   --   --    03/18/20 0100   --   --   100   13   128/70   --   95    03/18/20 0008   --   --   104   --   --   --   95    03/18/20 0005   --   --   98   12   127/65   --   95    03/17/20 2305   --   --   102   18   143/72   --   93    03/17/20 2105   --   --   107   15   132/68   --   92    03/17/20 2009   (!) 100.9 (38.3)   --   105   --   --   --   94    03/17/20 1905   --   --   104   20   137/79   --   97    03/17/20 1845   --   --   101   20   --   --   99    03/17/20 1835   --   --   102   22   --   --   95    03/17/20 1802   --   --   100   26   162/93   --   96    03/17/20 1734   --   --    100   --   148/93   --   90    03/17/20 1537   99.5 (37.5)   Oral   98   23   151/96   Lying   94    03/17/20 1515   99.3 (37.4)   Oral   94   22   146/89   Sitting   96    03/17/20 1500   98.8 (37.1)   Oral   97   20   133/89   Sitting   94    03/17/20 1430   --   --   95   20   125/90   Sitting   97    03/17/20 1400   --   --   92   20   151/72   Sitting   97    03/17/20 1330   --   --   89   20   143/82   Sitting   97    03/17/20 1300   --   --   97   20   130/80   Sitting   100    03/17/20 1200   --   --   96   20   134/87   Sitting   96    03/17/20 1100   --   --   --   --   --   --   94    03/17/20 1030   --   --   99   22   148/89   Sitting   95    03/17/20 1000   99.4 (37.4)   Oral   102   22   150/95   Sitting   96    03/17/20 0909   --   --   --   --   --   --   90    03/17/20 0840   100.3 (37.9)   Oral   106   28   106/72   Lying   93                Facility-Administered Medications as of 3/18/2020   Medication Dose Route Frequency Provider Last Rate Last Dose   • acetaminophen (TYLENOL) tablet 650 mg  650 mg Oral Q4H PRN Marcelino Dunaway MD   650 mg at 03/18/20 0919    Or   • acetaminophen (TYLENOL) 160 MG/5ML solution 650 mg  650 mg Oral Q4H PRN Marcelino Dunaway MD        Or   • acetaminophen (TYLENOL) suppository 650 mg  650 mg Rectal Q4H PRN Marcelino Dunaway MD       • [COMPLETED] acetaminophen (TYLENOL) tablet 1,000 mg  1,000 mg Oral Once Quinton Deras PA   1,000 mg at 03/17/20 0905   • [COMPLETED] acetaminophen (TYLENOL) tablet 650 mg  650 mg Oral Once Quinton Deras PA   650 mg at 03/17/20 1419   • albuterol (PROVENTIL) nebulizer solution 0.083% 2.5 mg/3mL  2.5 mg Nebulization Q4H PRN Marcelino Dunaway MD       • benzonatate (TESSALON) capsule 200 mg  200 mg Oral TID PRN Marcelino Dunaway MD   200 mg at 03/17/20 1723   • [COMPLETED] cefTRIAXone (ROCEPHIN) 2 g/100 mL 0.9% NS VTB (VALDEZ)  2 g Intravenous Once Quinton Deras PA   Stopped at 03/17/20 1120   • cefTRIAXone (ROCEPHIN) 2 g/100 mL 0.9% NS VTB  (VALDEZ)  2 g Intravenous Q24H Marcelino Dunaway MD   2 g at 03/18/20 0838   • clonazePAM (KlonoPIN) tablet 0.5 mg  0.5 mg Oral TID PRN Marcelino Dunaway MD       • dextrose (D50W) 25 g/ 50mL Intravenous Solution 25 g  25 g Intravenous Q15 Min PRN Brandy Solis PA       • dextrose (GLUTOSE) oral gel 15 g  15 g Oral Q15 Min PRN Brandy Solis PA       • [COMPLETED] doxycycline (VIBRAMYCIN) 100 mg/100 mL 0.9% NS MBP  100 mg Intravenous Once Quinton Deras PA   Stopped at 03/17/20 1210   • doxycycline (VIBRAMYCIN) 100 mg/100 mL 0.9% NS MBP  100 mg Intravenous Q12H Marcelino Dunaway MD       • DULoxetine (CYMBALTA) DR capsule 60 mg  60 mg Oral BID Marcelino Dunaway MD   60 mg at 03/18/20 0835   • estrogens (conjugated) (PREMARIN) tablet 0.6 mg  0.6 mg Oral Daily Marcelino Dunaway MD   0.6 mg at 03/18/20 0835   • glucagon (human recombinant) (GLUCAGEN DIAGNOSTIC) injection 1 mg  1 mg Subcutaneous Q15 Min PRN Brandy Solis PA       • [COMPLETED] guaiFENesin-dextromethorphan (ROBITUSSIN DM) 100-10 MG/5ML syrup 5 mL  5 mL Oral Once Quinton Deras PA   5 mL at 03/17/20 1219   • guaiFENesin-dextromethorphan (ROBITUSSIN DM) 100-10 MG/5ML syrup 5 mL  5 mL Oral Q4H PRN Brandy Solis PA   5 mL at 03/17/20 1723   • insulin regular (HumuLIN R) 500 UNIT/ML patient supplied pump   Subcutaneous Continuous Marcelino Dunaway MD       • [COMPLETED] Ioversol (OPTIRAY 320) injection 100 mL  100 mL Intravenous Once in imaging Johny Arenas MD   100 mL at 03/17/20 1143   • [COMPLETED] ipratropium-albuterol (DUO-NEB) nebulizer solution 3 mL  3 mL Nebulization Once Braeden, Quinton A, PA   3 mL at 03/17/20 0909   • ipratropium-albuterol (DUO-NEB) nebulizer solution 3 mL  3 mL Nebulization 4x Daily - RT Marcelino Dunaway MD   3 mL at 03/17/20 1835   • [COMPLETED] ketorolac (TORADOL) injection 15 mg  15 mg Intravenous Once Quinton Deras PA   15 mg at 03/17/20 1022   • lactobacillus acidophilus (RISAQUAD) capsule 1 capsule  1 capsule  Oral Daily Brandy Solis PA   1 capsule at 03/18/20 0836   • lactobacillus acidophilus (RISAQUAD) capsule 1 capsule  1 capsule Oral Daily Marcelino Dunaway MD       • methylPREDNISolone sodium succinate (SOLU-Medrol) injection 40 mg  40 mg Intravenous Q12H Marcelino Dunaway MD   40 mg at 03/18/20 0526   • metoprolol succinate XL (TOPROL-XL) 24 hr tablet 50 mg  50 mg Oral Daily Marcelino Dunaway MD   50 mg at 03/18/20 0836   • multivitamin (DAILY TERRI) tablet 1 tablet  1 tablet Oral Nightly Marcelino Dunaway MD       • nitroglycerin (NITROSTAT) SL tablet 0.4 mg  0.4 mg Sublingual Q5 Min PRN rBandy Solis PA       • [COMPLETED] oseltamivir (TAMIFLU) capsule 75 mg  75 mg Oral Once Quinton Deras PA   75 mg at 03/17/20 1106   • oseltamivir (TAMIFLU) capsule 75 mg  75 mg Oral Q12H Marcelino Dunaway MD   75 mg at 03/18/20 0835   • pantoprazole (PROTONIX) EC tablet 40 mg  40 mg Oral BID Marcelino Dunaway MD   40 mg at 03/18/20 0835   • Pharmacy Consult - Pharmacy to dose   Does not apply Continuous PRN Marcelino Dunaway MD       • Pharmacy Consult - Pharmacy to dose   Does not apply Continuous PRN Marcelino Dunaway MD       • pregabalin (LYRICA) capsule 150 mg  150 mg Oral Q12H Marcelino Dunaway MD   150 mg at 03/18/20 0836   • promethazine (PHENERGAN) tablet 6.25 mg  6.25 mg Oral Q6H PRN Manuel Laughlin MD        Or   • promethazine (PHENERGAN) 6.25 mg in sodium chloride 0.9 % 50 mL  6.25 mg Intravenous Q6H PRN Manuel Laughlin MD   6.25 mg at 03/17/20 2109   • QUEtiapine (SEROquel) tablet 200 mg  200 mg Oral Nightly Marcelino Dunaway MD       • ranolazine (RANEXA) 12 hr tablet 500 mg  500 mg Oral Q12H Marcelino Dunaway MD   500 mg at 03/18/20 0835   • sodium chloride 0.9 % flush 10 mL  10 mL Intravenous PRN Brandy Solis PA       • sodium chloride 0.9 % flush 10 mL  10 mL Intravenous Q12H Marcelino Dunaway MD   10 mL at 03/18/20 0837   • sodium chloride 0.9 % flush 10 mL  10 mL Intravenous PRN Marcelino Dunaway MD        • [COMPLETED] vancomycin (VANCOCIN) 1,750 mg in sodium chloride 0.9 % 500 mL IVPB  1,750 mg Intravenous Once Marcelino Dunaway MD   1,750 mg at 03/17/20 1724        Physician Progress Notes (all)      Marcelino Dunaway MD at 03/18/20 0841          Assisted By: Alize PATRICK    CC: Follow-up on influenza    Interview History/HPI: Patient states she is feeling a little better, less shortness of breath, headache is worse when she coughs but she only took 1 shot of Toradol last p.m.  Appetite is poor, no abdominal pain however she does not think she is having any edema, she does feel like the room is side and she is still having significant coughing paroxysms.      Vitals:    03/18/20 0835   BP: 134/68   Pulse: 90   Resp:    Temp:    SpO2:          Intake/Output Summary (Last 24 hours) at 3/18/2020 0841  Last data filed at 3/18/2020 0500  Gross per 24 hour   Intake 730 ml   Output 1000 ml   Net -270 ml       EXAM: She is now afebrile, T-max overnight 100.9, 20, 90, 98.5.  Overall she does appear to feel better hearing is intact pupils equal face symmetric speech normal lungs have bilateral breath sounds diminished bases but no wheezing heard today no crackles heart is a regular rate and rhythm without murmur rub or gallop abdomen is rounded soft nontender extremities are without edema skin warm and dry she can speak in full sentences without difficulty and even discussed going home.      Tele: Sinus rhythm    LABS:   Lab Results (last 48 hours)     Procedure Component Value Units Date/Time    Hemoglobin A1c [[id number]]  (Abnormal) Collected:  03/18/20 0407    Specimen:  Blood Updated:  03/18/20 0557     Hemoglobin A1C 7.50 %     Narrative:       Hemoglobin A1C Ranges:    Increased Risk for Diabetes  5.7% to 6.4%  Diabetes                     >= 6.5%  Diabetic Goal                < 7.0%    POC Glucose Once [[id number]]  (Abnormal) Collected:  03/18/20 0525    Specimen:  Blood Updated:  03/18/20 0553     Glucose 144 mg/dL      "Procalcitonin [[id number]]  (Normal) Collected:  03/17/20 1559    Specimen:  Blood Updated:  03/18/20 0550     Procalcitonin 0.12 ng/mL     Narrative:       As a Marker for Sepsis (Non-Neonates):   1. <0.5 ng/mL represents a low risk of severe sepsis and/or septic shock.  1. >2 ng/mL represents a high risk of severe sepsis and/or septic shock.    As a Marker for Lower Respiratory Tract Infections that require antibiotic therapy:  PCT on Admission     Antibiotic Therapy             6-12 Hrs later  > 0.5                Strongly Recommended            >0.25 - <0.5         Recommended  0.1 - 0.25           Discouraged                   Remeasure/reassess PCT  <0.1                 Strongly Discouraged          Remeasure/reassess PCT      As 28 day mortality risk marker: \"Change in Procalcitonin Result\" (> 80 % or <=80 %) if Day 0 (or Day 1) and Day 4 values are available. Refer to http://www.[employer]-pct-calculator.com/   Change in PCT <=80 %   A decrease of PCT levels below or equal to 80 % defines a positive change in PCT test result representing a higher risk for 28-day all-cause mortality of patients diagnosed with severe sepsis or septic shock.  Change in PCT > 80 %   A decrease of PCT levels of more than 80 % defines a negative change in PCT result representing a lower risk for 28-day all-cause mortality of patients diagnosed with severe sepsis or septic shock.                Results may be falsely decreased if patient taking Biotin.     Lactic Acid, Plasma [[id number]]  (Abnormal) Collected:  03/18/20 0407    Specimen:  Blood Updated:  03/18/20 0452     Lactate 2.1 mmol/L     Troponin [[id number]]  (Normal) Collected:  03/18/20 0407    Specimen:  Blood Updated:  03/18/20 0451     Troponin T <0.010 ng/mL     Narrative:       Troponin T Reference Range:  <= 0.03 ng/mL-   Negative for AMI  >0.03 ng/mL-     Abnormal for myocardial necrosis.  Clinicians would have to utilize clinical acumen, EKG, Troponin and serial changes " to determine if it is an Acute Myocardial Infarction or myocardial injury due to an underlying chronic condition.       Results may be falsely decreased if patient taking Biotin.      Comprehensive Metabolic Panel [[id number]]  (Abnormal) Collected:  03/18/20 0407    Specimen:  Blood Updated:  03/18/20 0448     Glucose 142 mg/dL      BUN 10 mg/dL      Creatinine 0.70 mg/dL      Sodium 137 mmol/L      Potassium 4.0 mmol/L      Chloride 100 mmol/L      CO2 22.5 mmol/L      Calcium 8.2 mg/dL      Total Protein 6.7 g/dL      Albumin 3.16 g/dL      ALT (SGPT) 38 U/L      AST (SGOT) 112 U/L      Alkaline Phosphatase 82 U/L      Total Bilirubin 0.5 mg/dL      eGFR Non African Amer 89 mL/min/1.73      Globulin 3.5 gm/dL      A/G Ratio 0.9 g/dL      BUN/Creatinine Ratio 14.3     Anion Gap 14.5 mmol/L     Narrative:       GFR Normal >60  Chronic Kidney Disease <60  Kidney Failure <15      C-reactive Protein [[id number]]  (Abnormal) Collected:  03/18/20 0407    Specimen:  Blood Updated:  03/18/20 0448     C-Reactive Protein 5.80 mg/dL     Protime-INR [[id number]]  (Abnormal) Collected:  03/18/20 0407    Specimen:  Blood Updated:  03/18/20 0446     Protime 15.3 Seconds      INR 1.15    Narrative:       Suggested INR therapeutic range for stable oral anticoagulant therapy:    Low Intensity therapy:   1.5-2.0  Moderate Intensity therapy:   2.0-3.0  High Intensity therapy:   2.5-4.0    CBC & Differential [[id number]] Collected:  03/18/20 0407    Specimen:  Blood Updated:  03/18/20 0430    Narrative:       The following orders were created for panel order CBC & Differential.  Procedure                               Abnormality         Status                     ---------                               -----------         ------                     CBC Auto Differential[[id number]]        Abnormal            Final result                 Please view results for these tests on the individual orders.    CBC Auto Differential [[id number]]   (Abnormal) Collected:  03/18/20 0407    Specimen:  Blood Updated:  03/18/20 0430     WBC 3.59 10*3/mm3      RBC 4.02 10*6/mm3      Hemoglobin 13.2 g/dL      Hematocrit 40.6 %      .0 fL      MCH 32.8 pg      MCHC 32.5 g/dL      RDW 13.4 %      RDW-SD 50.4 fl      MPV 12.0 fL      Platelets 60 10*3/mm3      Neutrophil % 71.6 %      Lymphocyte % 17.5 %      Monocyte % 10.3 %      Eosinophil % 0.0 %      Basophil % 0.3 %      Immature Grans % 0.3 %      Neutrophils, Absolute 2.57 10*3/mm3      Lymphocytes, Absolute 0.63 10*3/mm3      Monocytes, Absolute 0.37 10*3/mm3      Eosinophils, Absolute 0.00 10*3/mm3      Basophils, Absolute 0.01 10*3/mm3      Immature Grans, Absolute 0.01 10*3/mm3      nRBC 0.0 /100 WBC     Vitamin B12 [[id number]] Collected:  03/18/20 0407    Specimen:  Blood Updated:  03/18/20 0426    Folate [[id number]] Collected:  03/18/20 0407    Specimen:  Blood Updated:  03/18/20 0426    Troponin [[id number]]  (Normal) Collected:  03/17/20 2321    Specimen:  Blood Updated:  03/17/20 2351     Troponin T <0.010 ng/mL     Narrative:       Troponin T Reference Range:  <= 0.03 ng/mL-   Negative for AMI  >0.03 ng/mL-     Abnormal for myocardial necrosis.  Clinicians would have to utilize clinical acumen, EKG, Troponin and serial changes to determine if it is an Acute Myocardial Infarction or myocardial injury due to an underlying chronic condition.       Results may be falsely decreased if patient taking Biotin.      POC Glucose Once [[id number]]  (Abnormal) Collected:  03/17/20 2112    Specimen:  Blood Updated:  03/17/20 2121     Glucose 134 mg/dL     MRSA Screen, PCR - Swab, Nares [[id number]]  (Normal) Collected:  03/17/20 1623    Specimen:  Swab from Nares Updated:  03/17/20 1830     MRSA PCR Negative     Staph aureus by PCR Negative    Troponin [[id number]]  (Normal) Collected:  03/17/20 1648    Specimen:  Blood Updated:  03/17/20 1712     Troponin T <0.010 ng/mL     Narrative:       Troponin T Reference  Range:  <= 0.03 ng/mL-   Negative for AMI  >0.03 ng/mL-     Abnormal for myocardial necrosis.  Clinicians would have to utilize clinical acumen, EKG, Troponin and serial changes to determine if it is an Acute Myocardial Infarction or myocardial injury due to an underlying chronic condition.       Results may be falsely decreased if patient taking Biotin.      Legionella Antigen, Urine - Urine, Urine, Clean Catch [[id number]]  (Normal) Collected:  03/17/20 0949    Specimen:  Urine, Clean Catch Updated:  03/17/20 1647     LEGIONELLA ANTIGEN, URINE Negative    Narrative:       Presumptive negative for L. pneumophilia serogroup 1 antigen, suggesting no recent or current infection.    POC Glucose Once [[id number]]  (Normal) Collected:  03/17/20 1622    Specimen:  Blood Updated:  03/17/20 1641     Glucose 88 mg/dL     Pregnancy, Urine - Urine, Clean Catch [[id number]]  (Normal) Collected:  03/17/20 0949    Specimen:  Urine, Clean Catch Updated:  03/17/20 1616     HCG, Urine QL Negative    Narrative:       Diluted specimens may cause false negative results.    C-reactive Protein [[id number]]  (Abnormal) Collected:  03/17/20 0923    Specimen:  Blood from Arm, Left Updated:  03/17/20 1606     C-Reactive Protein 2.46 mg/dL     Lactic Acid, Reflex [[id number]]  (Abnormal) Collected:  03/17/20 1331    Specimen:  Blood from Arm, Right Updated:  03/17/20 1405     Lactate 2.2 mmol/L     Ferritin [[id number]]  (Normal) Collected:  03/17/20 0923    Specimen:  Blood from Arm, Left Updated:  03/17/20 1402     Ferritin 146.30 ng/mL     Narrative:       Results may be falsely decreased if patient taking Biotin.      Lactic Acid, Reflex Timer (This will reflex a repeat order 3-3:15 hours after ordered.) [[id number]] Collected:  03/17/20 0923    Specimen:  Blood from Arm, Left Updated:  03/17/20 1301     Hold Tube Hold for add-ons.     Comment: Auto resulted.       Respiratory Panel, PCR - Swab, Nasopharynx [[id number]]  (Abnormal) Collected:   03/17/20 0921    Specimen:  Swab from Nasopharynx Updated:  03/17/20 1050     ADENOVIRUS, PCR Not Detected     Coronavirus 229E Not Detected     Coronavirus HKU1 Not Detected     Coronavirus NL63 Not Detected     Coronavirus OC43 Not Detected     Human Metapneumovirus Not Detected     Human Rhinovirus/Enterovirus Not Detected     Influenza B PCR Not Detected     Parainfluenza Virus 1 Not Detected     Parainfluenza Virus 2 Not Detected     Parainfluenza Virus 3 Not Detected     Parainfluenza Virus 4 Not Detected     Bordetella pertussis pcr Not Detected     Influenza A H1 2009 PCR Detected     Chlamydophila pneumoniae PCR Not Detected     Mycoplasma pneumo by PCR Not Detected     Influenza A PCR Not Detected     Influenza A H3 Not Detected     Influenza A H1 Not Detected     RSV, PCR Not Detected    Narrative:       The coronavirus on the RVP is NOT COVID-19 and is NOT indicative of infection with COVID-19.     Urinalysis With Microscopic If Indicated (No Culture) - Urine, Clean Catch [[id number]]  (Abnormal) Collected:  03/17/20 0949    Specimen:  Urine, Clean Catch Updated:  03/17/20 1008     Color, UA Dark Yellow     Appearance, UA Clear     pH, UA 8.0     Specific Gravity, UA >=1.030     Glucose, UA Negative     Ketones, UA Trace     Bilirubin, UA Small (1+)     Blood, UA Large (3+)     Protein, UA Trace     Leuk Esterase, UA Trace     Nitrite, UA Negative     Urobilinogen, UA 4.0 E.U./dL    Urinalysis, Microscopic Only - Urine, Clean Catch [[id number]]  (Abnormal) Collected:  03/17/20 0949    Specimen:  Urine, Clean Catch Updated:  03/17/20 1008     RBC, UA Too Numerous to Count /HPF      WBC, UA 0-2 /HPF      Bacteria, UA None Seen /HPF      Squamous Epithelial Cells, UA 3-6 /HPF      Hyaline Casts, UA 3-6 /LPF      Methodology Automated Microscopy    Lactic Acid, Plasma [[id number]]  (Abnormal) Collected:  03/17/20 0923    Specimen:  Blood from Arm, Left Updated:  03/17/20 1000     Lactate 2.4 mmol/L     Blood  Culture - Blood, Arm, Right [[id number]] Collected:  03/17/20 0949    Specimen:  Blood from Arm, Right Updated:  03/17/20 0958    Comprehensive Metabolic Panel [[id number]]  (Abnormal) Collected:  03/17/20 0923    Specimen:  Blood from Arm, Left Updated:  03/17/20 0957     Glucose 167 mg/dL      BUN 8 mg/dL      Creatinine 0.77 mg/dL      Sodium 138 mmol/L      Potassium 4.1 mmol/L      Chloride 100 mmol/L      CO2 24.9 mmol/L      Calcium 8.5 mg/dL      Total Protein 7.1 g/dL      Albumin 3.59 g/dL      ALT (SGPT) 28 U/L      AST (SGOT) 63 U/L      Alkaline Phosphatase 90 U/L      Total Bilirubin 0.7 mg/dL      eGFR Non African Amer 80 mL/min/1.73      Globulin 3.5 gm/dL      A/G Ratio 1.0 g/dL      BUN/Creatinine Ratio 10.4     Anion Gap 13.1 mmol/L     Narrative:       GFR Normal >60  Chronic Kidney Disease <60  Kidney Failure <15      Troponin [[id number]]  (Normal) Collected:  03/17/20 0923    Specimen:  Blood from Arm, Left Updated:  03/17/20 0957     Troponin T <0.010 ng/mL     Narrative:       Troponin T Reference Range:  <= 0.03 ng/mL-   Negative for AMI  >0.03 ng/mL-     Abnormal for myocardial necrosis.  Clinicians would have to utilize clinical acumen, EKG, Troponin and serial changes to determine if it is an Acute Myocardial Infarction or myocardial injury due to an underlying chronic condition.       Results may be falsely decreased if patient taking Biotin.      BNP [[id number]]  (Normal) Collected:  03/17/20 0923    Specimen:  Blood from Arm, Left Updated:  03/17/20 0955     proBNP 203.1 pg/mL     Narrative:       Among patients with dyspnea, NT-proBNP is highly sensitive for the detection of acute congestive heart failure. In addition NT-proBNP of <300 pg/ml effectively rules out acute congestive heart failure with 99% negative predictive value.    Results may be falsely decreased if patient taking Biotin.      CBC & Differential [[id number]] Collected:  03/17/20 0923    Specimen:  Blood from Arm, Left  Updated:  03/17/20 0933    Narrative:       The following orders were created for panel order CBC & Differential.  Procedure                               Abnormality         Status                     ---------                               -----------         ------                     CBC Auto Differential[[id number]]        Abnormal            Final result                 Please view results for these tests on the individual orders.    CBC Auto Differential [[id number]]  (Abnormal) Collected:  03/17/20 0923    Specimen:  Blood from Arm, Left Updated:  03/17/20 0933     WBC 4.83 10*3/mm3      RBC 4.22 10*6/mm3      Hemoglobin 13.8 g/dL      Hematocrit 42.5 %      .7 fL      MCH 32.7 pg      MCHC 32.5 g/dL      RDW 13.3 %      RDW-SD 49.5 fl      MPV 11.6 fL      Platelets 79 10*3/mm3      Neutrophil % 74.2 %      Lymphocyte % 10.1 %      Monocyte % 14.9 %      Eosinophil % 0.2 %      Basophil % 0.4 %      Immature Grans % 0.2 %      Neutrophils, Absolute 3.58 10*3/mm3      Lymphocytes, Absolute 0.49 10*3/mm3      Monocytes, Absolute 0.72 10*3/mm3      Eosinophils, Absolute 0.01 10*3/mm3      Basophils, Absolute 0.02 10*3/mm3      Immature Grans, Absolute 0.01 10*3/mm3      nRBC 0.0 /100 WBC     Blood Culture - Blood, Arm, Left [[id number]] Collected:  03/17/20 0923    Specimen:  Blood from Arm, Left Updated:  03/17/20 0928    Blood Gas, Arterial With Co-Ox [[id number]]  (Abnormal) Collected:  03/17/20 0911    Specimen:  Arterial Blood Updated:  03/17/20 0917     Site Left Radial     Neal's Test Positive     pH, Arterial 7.385 pH units      pCO2, Arterial 46.7 mm Hg      pO2, Arterial 52.4 mm Hg      Comment: 85 Value below critical limit        HCO3, Arterial 27.9 mmol/L      Comment: 83 Value above reference range        Base Excess, Arterial 2.2 mmol/L      O2 Saturation, Arterial 87.8 %      Comment: 84 Value below reference range        Hemoglobin, Blood Gas 13.9 g/dL      Hematocrit, Blood Gas 42.6 %       Oxyhemoglobin 85.1 %      Comment: 84 Value below reference range        Methemoglobin 0.20 %      Carboxyhemoglobin 2.9 %      A-a Gradiant 39.0 mmHg      CO2 Content 29.3 mmol/L      Temperature 0.0 C      Barometric Pressure for Blood Gas 733 mmHg      Modality Room Air     FIO2 21 %      Ventilator Mode NA     Note --     Notified Jose BEAL NP AND ROBINSON PATRICK, ER     Notified By [id number]     Notified Time 03/17/2020 09:20     Collected by [id number]     Comment: Meter: [id number]     :  [id number]        pH, Temp Corrected --     pCO2, Temperature Corrected --     pO2, Temperature Corrected --          Radiology:    Imaging Results (Last 72 Hours)     Procedure Component Value Units Date/Time    CT Head Without Contrast [[id number]] Collected:  03/17/20 1642     Updated:  03/17/20 1645    Narrative:       EXAMINATION: CT HEAD WO CONTRAST-      CLINICAL INDICATION:     HA; J10.1-Influenza due to other identified  influenza virus with other respiratory manifestations; J18.9-Pneumonia,  unspecified organism     COMPARISON:    None     Technique: Multiple CT axial images were obtained through the level of  the brain without IV contrast administration. Reformatted images in the  coronal and/or sagittal plane(s) were generated from the axial data set  to facilitate diagnostic accuracy and/or surgical planning.     Radiation dose reduction techniques were utilized per ALARA protocol.  Automated exposure control was initiated through either or CareDose or  DoseRig[employer] software packages by  protocol.       DOSE (DLP mGy-cm):     FINDINGS:     Brain: Unremarkable. No parenchymal hemorrhage or mass. No white matter  abnormality. No areas of mass effect.  Ventricles: Unremarkable. No hydrocephalus.  Extra-axial spaces: Unremarkable. No extra-axial hemorrhage. No  extra-axial masses.  Bones: Unremarkable. No acute fracture identified.  Sinuses: Unremarkable as visualized. No air-fluid levels.  Mastoids:  Unremarkable. No mastoid effusions.  Soft Tissues: Unremarkable.          Impression:       No CT evidence of acute intracranial abnormality.     This report was finalized on 3/17/2020 4:43 PM by Dr. Mika Vasquez MD.       XR Chest 1 View [[id number]] Collected:  03/17/20 1011     Updated:  03/17/20 1156    Narrative:       EXAMINATION: XR CHEST 1 VW-      CLINICAL INDICATION:     cough, sob     TECHNIQUE:  XR CHEST 1 VW-      COMPARISON: 10/10/2015      FINDINGS:      Lungs are aerated.   Heart size, mediastinum, and pulmonary vascularity are unremarkable.   No pneumothorax.   No effusions.   No acute osseous findings.          Impression:       No radiographic evidence of acute cardiac or pulmonary  disease.     This report was finalized on 3/17/2020 10:19 AM by Dr. Mika Vasquez MD.       CT Abdomen Pelvis Stone Protocol [[id number]] Collected:  03/17/20 1150     Updated:  03/17/20 1156    Narrative:       EXAM: CT ABDOMEN PELVIS STONE PROTOCOL-            TECHNIQUE: Multiple axial CT images were obtained from lung bases  through pubic symphysis WITHOUT administration of IV contrast.  Reformatted images in the coronal and/or sagittal plane(s) were  generated from the axial data set to facilitate diagnostic accuracy  and/or surgical planning.  Oral Contrast:NONE.     Radiation dose reduction techniques were utilized per ALARA protocol.  Automated exposure control was initiated through either or [employer] or  DoseRig[employer]IPPATE software packages by  protocol.       DOSE:     Clinical information  Hematuria      Comparison  Comparison: 02/10/2020     Findings  LOWER THORAX: STABLE MILD CARDIOMEGALY. CENTRILOBULAR NODULES RIGHT  LOWER LOBE COMPATIBLE WITH ATYPICAL PNEUMONIA. NO CONSOLIDATIVE  PNEUMONIA IDENTIFIED.     ABDOMEN:        LIVER: LIVER CIRRHOSIS CHANGES ARE AGAIN NOTED WITH NO FOCAL LIVER  LESION OR PERIHEPATIC ASCITES IDENTIFIED.        GALLBLADDER: CHOLECYSTECTOMY.        PANCREAS: Unremarkable. No  mass or ductal dilatation.        SPLEEN: BORDERLINE ENLARGEMENT OF THE SPLEEN IS STABLE.        ADRENALS: No mass.        KIDNEYS/URETERS: NO RENAL OR URETERAL STONES OR HYDRONEPHROSIS NOTED.        GI TRACT: Non-dilated. No definite wall thickening.        PERITONEUM: No free air. No free fluid or loculated fluid  collections.        MESENTERY: Unremarkable.        LYMPH NODES: PROMINENT AND LIKELY REACTIVE MILDLY ENLARGED CIERA  HEPATIS AND UPPER ABDOMINAL LYMPH NODES.        VASCULATURE: STABLE MILD ATHEROSCLEROSIS.        ABDOMINAL WALL: No focal hernia or mass.           OTHER: None.     PELVIS:        BLADDER: URINARY BLADDER IS INCOMPLETELY DISTENDED BUT IS OTHERWISE  UNREMARKABLE IN APPEARANCE.        REPRODUCTIVE: HYSTERECTOMY.        APPENDIX: APPENDECTOMY.     BONES: DEGENERATIVE CHANGES LUMBAR SPINE BUT NO ACUTE BONY FINDINGS  IDENTIFIED.       Impression:       Impression:  1. Right lower lobe atypical pneumonia.  2. Liver cirrhosis. No significant ascites.  3. No renal or ureteral stones. No hydronephrosis.  4. Other nonacute/incidental findings as above.     This report was finalized on 3/17/2020 11:52 AM by Dr. Mika Vasquez MD.       CT Chest With Contrast [[id number]] Collected:  03/17/20 1152     Updated:  03/17/20 1156    Narrative:       EXAM: CT CHEST W CONTRAST-            CLINICAL INDICATION:cough, sob      COMPARISON: NONE.     TECHNIQUE: Multiple axial CT images were obtained from lung apex through  upper abdomen WITH administration of IV contrast. Reformatted images in  the coronal and/or sagittal plane(s) were generated from the axial data  set to facilitate diagnostic accuracy and/or surgical planning.     Radiation dose reduction techniques were utilized per ALARA protocol.  Automated exposure control was initiated through either or [employer]DoIn[employer] or  DoseRigSignifyd software packages by  protocol.    DOSE (DLP mGy-cm):        FINDINGS:     LUNGS: CENTRILOBULAR NODULES OF THE RIGHT  UPPER LOBE AND RIGHT LOWER  LOBE AND MINIMAL CENTRILOBULAR NODULES IN THE LEFT LOWER LOBE SUPERIOR  SEGMENT MOST CONSISTENT WITH RIGHT GREATER THAN LEFT BILATERAL ATYPICAL  PNEUMONIA. NO LOBAR CONSOLIDATION IDENTIFIED.     HEART: MILD CARDIOMEGALY IS NOTED.     PERICARDIUM: No effusion.     MEDIASTINUM: PROBABLE REACTIVE LYMPH NODES IN THE RIGHT HILUM AND  MEDIASTINUM BUT NO BULKY ADENOPATHY IDENTIFIED.     PLEURA: No pleural effusion. No pleural mass or abnormal calcification.  No pneumothorax.     MAJOR AIRWAYS: Clear. No intrinsic mass.     VASCULATURE: No evidence of aneurysm.     VISUALIZED UPPER ABDOMEN:LIVER CIRRHOSIS AND MILD SPLENOMEGALY. PRIOR  CHOLECYSTECTOMY.     OTHER: None.     BONES: DEGENERATIVE CHANGES THORACIC SPINE BUT NO ACUTE BONY FINDINGS  IDENTIFIED.       Impression:       1. Bilateral right greater than left centrilobular nodularity as  detailed above most consistent with bilateral atypical pneumonia.  2. Probable reactive borderline adenopathy of the right hilum and  mediastinum. No bulky adenopathy identified.  3. Mild cardiomegaly.  4. Liver cirrhosis. Other nonacute findings as above.     This report was finalized on 3/17/2020 11:53 AM by Dr. Mika Vasquez MD.                  Assessment/Plan:   Severe sepsis secondary to H1 N1 influenza.  She has associated pneumonia.  MRSA swab was negative, her platelets have dropped further, I am stopping vancomycin, I have added doxycycline continue Rocephin, CRP is drifted up slightly but will follow.  White count has gone down.  With this and the platelets going down however this may be secondary to splenomegaly or bone marrow suppression from her overall infectious process.      Hypoxic respiratory failure, appears to be stable, on 2 L nasal cannula with adequate saturations.  Wheezing is significantly improved.  Feel patient is stable to transfer to the floor.    Diabetes, patient has her insulin pump in place, I have stopped sliding scale  insulin but continue to monitor glucoses.  Glucoses are controlled, A1c acceptable/improved at 7.5.    Cirrhosis, can have outpatient work-up INR was almost normal    Morbid obesity by BMI, stable    Elevated transaminases, this is new for her although she has a history of cirrhosis, most likely related to viral illness.  I am holding her Lipitor.    DVT prophylaxis, SCUDs, not using anticoagulants secondary to thrombocytopenia    Noted repeat EKG yesterday was unremarkable troponins negative.  No chest pain.        Electronically signed by Marcelino Dunaway MD at 03/18/20 0854     Marcelino Dunaway MD at 03/17/20 1611          Assisted By: Kelley PATRICK    CC: Evaluate cough congestion and hypoxia    Interview History/HPI: This note is in conjunction with history and physical done by Kenyetta WAITE.  Patient States that this illness began fairly sudden onset yesterday.  She has had dry cough congestion headache fever to about 100 and headache.  Tylenol has not helped the headache, she would like something else for this, she is allergic to morphine however this causes nausea and hives.  She has had Toradol before but does not remember any other narcotic analgesia.  She has been short of breath, she was found to have pneumonia and influenza in the emergency room.  She was admitted for further evaluation treatment, she did have the Pneumovax as well as the flu vaccine this year.       Vitals:    03/17/20 1537   BP: 151/96   Pulse: 98   Resp: 23   Temp: 99.5 °F (37.5 °C)   SpO2: 94%         Intake/Output Summary (Last 24 hours) at 3/17/2020 1611  Last data filed at 3/17/2020 1210  Gross per 24 hour   Intake 200 ml   Output --   Net 200 ml       EXAM: She has a dry nagging cough, face redness when she coughs, morbidly obese by BMI, in no distress her saturation is 94 to 95% on 2 L nasal cannula.  Neck is supple pupils equal speech normal strength symmetric lungs have bilateral breath sounds diminished sounds throughout with some  end expiratory wheezing heard no definite crackles heart regular rate and rhythm without murmur rub or gallop, abdomen soft benign bowel sounds are active extremities are without significant edema, possibly trace      EKG: Nonspecific findings in the inferior leads predominantly lead III compared to old EKG        LABS:   Lab Results (last 48 hours)     Procedure Component Value Units Date/Time    C-reactive Protein [[id number]]  (Abnormal) Collected:  03/17/20 0923    Specimen:  Blood from Arm, Left Updated:  03/17/20 1606     C-Reactive Protein 2.46 mg/dL     Procalcitonin [[id number]] Collected:  03/17/20 1559    Specimen:  Blood Updated:  03/17/20 1603    Lactic Acid, Reflex [[id number]]  (Abnormal) Collected:  03/17/20 1331    Specimen:  Blood from Arm, Right Updated:  03/17/20 1405     Lactate 2.2 mmol/L     Ferritin [[id number]]  (Normal) Collected:  03/17/20 0923    Specimen:  Blood from Arm, Left Updated:  03/17/20 1402     Ferritin 146.30 ng/mL     Narrative:       Results may be falsely decreased if patient taking Biotin.      Lactic Acid, Reflex Timer (This will reflex a repeat order 3-3:15 hours after ordered.) [[id number]] Collected:  03/17/20 0923    Specimen:  Blood from Arm, Left Updated:  03/17/20 1301     Hold Tube Hold for add-ons.     Comment: Auto resulted.       Respiratory Panel, PCR - Swab, Nasopharynx [[id number]]  (Abnormal) Collected:  03/17/20 0921    Specimen:  Swab from Nasopharynx Updated:  03/17/20 1050     ADENOVIRUS, PCR Not Detected     Coronavirus 229E Not Detected     Coronavirus HKU1 Not Detected     Coronavirus NL63 Not Detected     Coronavirus OC43 Not Detected     Human Metapneumovirus Not Detected     Human Rhinovirus/Enterovirus Not Detected     Influenza B PCR Not Detected     Parainfluenza Virus 1 Not Detected     Parainfluenza Virus 2 Not Detected     Parainfluenza Virus 3 Not Detected     Parainfluenza Virus 4 Not Detected     Bordetella pertussis pcr Not Detected      Influenza A H1 2009 PCR Detected     Chlamydophila pneumoniae PCR Not Detected     Mycoplasma pneumo by PCR Not Detected     Influenza A PCR Not Detected     Influenza A H3 Not Detected     Influenza A H1 Not Detected     RSV, PCR Not Detected    Narrative:       The coronavirus on the RVP is NOT COVID-19 and is NOT indicative of infection with COVID-19.     Urinalysis With Microscopic If Indicated (No Culture) - Urine, Clean Catch [[id number]]  (Abnormal) Collected:  03/17/20 0949    Specimen:  Urine, Clean Catch Updated:  03/17/20 1008     Color, UA Dark Yellow     Appearance, UA Clear     pH, UA 8.0     Specific Gravity, UA >=1.030     Glucose, UA Negative     Ketones, UA Trace     Bilirubin, UA Small (1+)     Blood, UA Large (3+)     Protein, UA Trace     Leuk Esterase, UA Trace     Nitrite, UA Negative     Urobilinogen, UA 4.0 E.U./dL    Urinalysis, Microscopic Only - Urine, Clean Catch [[id number]]  (Abnormal) Collected:  03/17/20 0949    Specimen:  Urine, Clean Catch Updated:  03/17/20 1008     RBC, UA Too Numerous to Count /HPF      WBC, UA 0-2 /HPF      Bacteria, UA None Seen /HPF      Squamous Epithelial Cells, UA 3-6 /HPF      Hyaline Casts, UA 3-6 /LPF      Methodology Automated Microscopy    Lactic Acid, Plasma [[id number]]  (Abnormal) Collected:  03/17/20 0923    Specimen:  Blood from Arm, Left Updated:  03/17/20 1000     Lactate 2.4 mmol/L     Blood Culture - Blood, Arm, Right [[id number]] Collected:  03/17/20 0949    Specimen:  Blood from Arm, Right Updated:  03/17/20 0958    Comprehensive Metabolic Panel [[id number]]  (Abnormal) Collected:  03/17/20 0923    Specimen:  Blood from Arm, Left Updated:  03/17/20 0957     Glucose 167 mg/dL      BUN 8 mg/dL      Creatinine 0.77 mg/dL      Sodium 138 mmol/L      Potassium 4.1 mmol/L      Chloride 100 mmol/L      CO2 24.9 mmol/L      Calcium 8.5 mg/dL      Total Protein 7.1 g/dL      Albumin 3.59 g/dL      ALT (SGPT) 28 U/L      AST (SGOT) 63 U/L      Alkaline  Phosphatase 90 U/L      Total Bilirubin 0.7 mg/dL      eGFR Non African Amer 80 mL/min/1.73      Globulin 3.5 gm/dL      A/G Ratio 1.0 g/dL      BUN/Creatinine Ratio 10.4     Anion Gap 13.1 mmol/L     Narrative:       GFR Normal >60  Chronic Kidney Disease <60  Kidney Failure <15      Troponin [[id number]]  (Normal) Collected:  03/17/20 0923    Specimen:  Blood from Arm, Left Updated:  03/17/20 0957     Troponin T <0.010 ng/mL     Narrative:       Troponin T Reference Range:  <= 0.03 ng/mL-   Negative for AMI  >0.03 ng/mL-     Abnormal for myocardial necrosis.  Clinicians would have to utilize clinical acumen, EKG, Troponin and serial changes to determine if it is an Acute Myocardial Infarction or myocardial injury due to an underlying chronic condition.       Results may be falsely decreased if patient taking Biotin.      BNP [[id number]]  (Normal) Collected:  03/17/20 0923    Specimen:  Blood from Arm, Left Updated:  03/17/20 0955     proBNP 203.1 pg/mL     Narrative:       Among patients with dyspnea, NT-proBNP is highly sensitive for the detection of acute congestive heart failure. In addition NT-proBNP of <300 pg/ml effectively rules out acute congestive heart failure with 99% negative predictive value.    Results may be falsely decreased if patient taking Biotin.      CBC & Differential [[id number]] Collected:  03/17/20 0923    Specimen:  Blood from Arm, Left Updated:  03/17/20 0933    Narrative:       The following orders were created for panel order CBC & Differential.  Procedure                               Abnormality         Status                     ---------                               -----------         ------                     CBC Auto Differential[[id number]]        Abnormal            Final result                 Please view results for these tests on the individual orders.    CBC Auto Differential [[id number]]  (Abnormal) Collected:  03/17/20 0923    Specimen:  Blood from Arm, Left Updated:   03/17/20 0933     WBC 4.83 10*3/mm3      RBC 4.22 10*6/mm3      Hemoglobin 13.8 g/dL      Hematocrit 42.5 %      .7 fL      MCH 32.7 pg      MCHC 32.5 g/dL      RDW 13.3 %      RDW-SD 49.5 fl      MPV 11.6 fL      Platelets 79 10*3/mm3      Neutrophil % 74.2 %      Lymphocyte % 10.1 %      Monocyte % 14.9 %      Eosinophil % 0.2 %      Basophil % 0.4 %      Immature Grans % 0.2 %      Neutrophils, Absolute 3.58 10*3/mm3      Lymphocytes, Absolute 0.49 10*3/mm3      Monocytes, Absolute 0.72 10*3/mm3      Eosinophils, Absolute 0.01 10*3/mm3      Basophils, Absolute 0.02 10*3/mm3      Immature Grans, Absolute 0.01 10*3/mm3      nRBC 0.0 /100 WBC     Blood Culture - Blood, Arm, Left [[id number]] Collected:  03/17/20 0923    Specimen:  Blood from Arm, Left Updated:  03/17/20 0928    Blood Gas, Arterial With Co-Ox [[id number]]  (Abnormal) Collected:  03/17/20 0911    Specimen:  Arterial Blood Updated:  03/17/20 0917     Site Left Radial     Neal's Test Positive     pH, Arterial 7.385 pH units      pCO2, Arterial 46.7 mm Hg      pO2, Arterial 52.4 mm Hg      Comment: 85 Value below critical limit        HCO3, Arterial 27.9 mmol/L      Comment: 83 Value above reference range        Base Excess, Arterial 2.2 mmol/L      O2 Saturation, Arterial 87.8 %      Comment: 84 Value below reference range        Hemoglobin, Blood Gas 13.9 g/dL      Hematocrit, Blood Gas 42.6 %      Oxyhemoglobin 85.1 %      Comment: 84 Value below reference range        Methemoglobin 0.20 %      Carboxyhemoglobin 2.9 %      A-a Gradiant 39.0 mmHg      CO2 Content 29.3 mmol/L      Temperature 0.0 C      Barometric Pressure for Blood Gas 733 mmHg      Modality Room Air     FIO2 21 %      Ventilator Mode NA     Note --     Notified Jose BEAL NP AND ROBINSON PATRICK, ER     Notified By [id number]     Notified Time 03/17/2020 09:20     Collected by [id number]     Comment: Meter: [id number]     :  [id number]        pH, Temp Corrected --     pCO2,  Temperature Corrected --     pO2, Temperature Corrected --          Radiology:    Imaging Results (Last 72 Hours)     Procedure Component Value Units Date/Time    XR Chest 1 View [[id number]] Collected:  03/17/20 1011     Updated:  03/17/20 1156    Narrative:       EXAMINATION: XR CHEST 1 VW-      CLINICAL INDICATION:     cough, sob     TECHNIQUE:  XR CHEST 1 VW-      COMPARISON: 10/10/2015      FINDINGS:      Lungs are aerated.   Heart size, mediastinum, and pulmonary vascularity are unremarkable.   No pneumothorax.   No effusions.   No acute osseous findings.          Impression:       No radiographic evidence of acute cardiac or pulmonary  disease.     This report was finalized on 3/17/2020 10:19 AM by Dr. Mika Vasquez MD.       CT Abdomen Pelvis Stone Protocol [[id number]] Collected:  03/17/20 1150     Updated:  03/17/20 1156    Narrative:       EXAM: CT ABDOMEN PELVIS STONE PROTOCOL-            TECHNIQUE: Multiple axial CT images were obtained from lung bases  through pubic symphysis WITHOUT administration of IV contrast.  Reformatted images in the coronal and/or sagittal plane(s) were  generated from the axial data set to facilitate diagnostic accuracy  and/or surgical planning.  Oral Contrast:NONE.     Radiation dose reduction techniques were utilized per ALARA protocol.  Automated exposure control was initiated through either or CareDose or  [employer] software packages by  protocol.       DOSE:     Clinical information  Hematuria      Comparison  Comparison: 02/10/2020     Findings  LOWER THORAX: STABLE MILD CARDIOMEGALY. CENTRILOBULAR NODULES RIGHT  LOWER LOBE COMPATIBLE WITH ATYPICAL PNEUMONIA. NO CONSOLIDATIVE  PNEUMONIA IDENTIFIED.     ABDOMEN:        LIVER: LIVER CIRRHOSIS CHANGES ARE AGAIN NOTED WITH NO FOCAL LIVER  LESION OR PERIHEPATIC ASCITES IDENTIFIED.        GALLBLADDER: CHOLECYSTECTOMY.        PANCREAS: Unremarkable. No mass or ductal dilatation.        SPLEEN: BORDERLINE  ENLARGEMENT OF THE SPLEEN IS STABLE.        ADRENALS: No mass.        KIDNEYS/URETERS: NO RENAL OR URETERAL STONES OR HYDRONEPHROSIS NOTED.        GI TRACT: Non-dilated. No definite wall thickening.        PERITONEUM: No free air. No free fluid or loculated fluid  collections.        MESENTERY: Unremarkable.        LYMPH NODES: PROMINENT AND LIKELY REACTIVE MILDLY ENLARGED CIERA  HEPATIS AND UPPER ABDOMINAL LYMPH NODES.        VASCULATURE: STABLE MILD ATHEROSCLEROSIS.        ABDOMINAL WALL: No focal hernia or mass.           OTHER: None.     PELVIS:        BLADDER: URINARY BLADDER IS INCOMPLETELY DISTENDED BUT IS OTHERWISE  UNREMARKABLE IN APPEARANCE.        REPRODUCTIVE: HYSTERECTOMY.        APPENDIX: APPENDECTOMY.     BONES: DEGENERATIVE CHANGES LUMBAR SPINE BUT NO ACUTE BONY FINDINGS  IDENTIFIED.       Impression:       Impression:  1. Right lower lobe atypical pneumonia.  2. Liver cirrhosis. No significant ascites.  3. No renal or ureteral stones. No hydronephrosis.  4. Other nonacute/incidental findings as above.     This report was finalized on 3/17/2020 11:52 AM by Dr. Mika Vasquez MD.       CT Chest With Contrast [[id number]] Collected:  03/17/20 1152     Updated:  03/17/20 1156    Narrative:       EXAM: CT CHEST W CONTRAST-            CLINICAL INDICATION:cough, sob      COMPARISON: NONE.     TECHNIQUE: Multiple axial CT images were obtained from lung apex through  upper abdomen WITH administration of IV contrast. Reformatted images in  the coronal and/or sagittal plane(s) were generated from the axial data  set to facilitate diagnostic accuracy and/or surgical planning.     Radiation dose reduction techniques were utilized per ALARA protocol.  Automated exposure control was initiated through either or CareDose or  DoseRight software packages by  protocol.    DOSE (DLP mGy-cm):        FINDINGS:     LUNGS: CENTRILOBULAR NODULES OF THE RIGHT UPPER LOBE AND RIGHT LOWER  LOBE AND MINIMAL  CENTRILOBULAR NODULES IN THE LEFT LOWER LOBE SUPERIOR  SEGMENT MOST CONSISTENT WITH RIGHT GREATER THAN LEFT BILATERAL ATYPICAL  PNEUMONIA. NO LOBAR CONSOLIDATION IDENTIFIED.     HEART: MILD CARDIOMEGALY IS NOTED.     PERICARDIUM: No effusion.     MEDIASTINUM: PROBABLE REACTIVE LYMPH NODES IN THE RIGHT HILUM AND  MEDIASTINUM BUT NO BULKY ADENOPATHY IDENTIFIED.     PLEURA: No pleural effusion. No pleural mass or abnormal calcification.  No pneumothorax.     MAJOR AIRWAYS: Clear. No intrinsic mass.     VASCULATURE: No evidence of aneurysm.     VISUALIZED UPPER ABDOMEN:LIVER CIRRHOSIS AND MILD SPLENOMEGALY. PRIOR  CHOLECYSTECTOMY.     OTHER: None.     BONES: DEGENERATIVE CHANGES THORACIC SPINE BUT NO ACUTE BONY FINDINGS  IDENTIFIED.       Impression:       1. Bilateral right greater than left centrilobular nodularity as  detailed above most consistent with bilateral atypical pneumonia.  2. Probable reactive borderline adenopathy of the right hilum and  mediastinum. No bulky adenopathy identified.  3. Mild cardiomegaly.  4. Liver cirrhosis. Other nonacute findings as above.     This report was finalized on 3/17/2020 11:53 AM by Dr. Mika Vasquez MD.           CT reviewed, pneumonia component appears minimal       Assessment/Plan:   Severe sepsis secondary to influenza and associated pneumonia.  Patient has been started on Tamiflu as well as vancomycin and Rocephin.  MRSA nasal swab has been added, if negative possibly vancomycin could be stopped.  Will follow inflammatory markers.  Recheck lactic acid in the a.m.  Pulmonology has been consulted.     Acute hypoxic respiratory failure secondary to influenza, she also has some element of reactive airway disease with significant wheezing.  She may have COPD but I do not have PFTs to confirm this.  She quit smoking a month and half ago.  I have added nebulizer therapy as well as systemic steroids considering the wheezing and hypoxia.    Headache, neck is supple, will get CT  head, will try Demerol on a as needed basis.  Cannot use Toradol as her platelets are low.    Thrombocytopenia, chronic, she had splenomegaly previously.  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  This needs to be looked into as an outpatient.    DVT prophylaxis, with thrombocytopenia, patient has been placed on SCUDs.    Borderline EKG, repeat pending, will track troponins    Morbid obesity by BMI adversely affecting her health    Hematuria, uncertain etiology, no stone, will need to be followed up as an outpatient.  This is new from the February 10    Diabetes by previous A1c, monitor glucoses especially on steroids on low-dose sliding scale insulin.    Electronically signed by Marcelino Dunaway MD at 03/17/20 1631       Consult Notes (all)    No notes of this type exist for this encounter.

## 2020-03-18 NOTE — CONSULTS
Referring Provider: Dr. Dunaway  Reason for Consultation: atypical pneumonia, acute hypoxic respiratory failure      Chief complaint shortness of breath      History of present illness:      Mrs. Campos is a 50 year old female with past medical history of anxiety, depression, diabetes, hyperlipidemia, hypertension.  She initially presented to the ED yesterday due to shortness of breath, cough.  Symptoms had acutely started on the day prior to presentation.  She also admitted to headache, body aches, fever recorded at 101.  Symptoms were not improved with use of Tylenol.  Dyspnea is worsened by exertion, improved with rest.  She denies any recent travel or recently ill contacts, but had recently visited the hospital for x-rays on March 12.  ED evaluation revealed temperature of 100.3, tachycardia, tachypnea, initial saturation of 88% on room air.  Testing was ultimately significant for hypoxia on ABG with compensated respiratory acidosis and metabolic alkalosis.  CRP was elevated at 2.4 with a lactate noted at 2.4.  Procalcitonin was nonelevated.  No leukocytosis noted on CBC.  Urinalysis was only significant for trace ketones, 3+ blood, trace leukocytes, trace protein, small bilirubin, urobilinogen.  Blood cultures were obtained.  Respiratory PCR was significant for influenza A H1 2009.   CT chest with contrast showed right greater than left centrilobular nodularity consistent with bilateral atypical pneumonia, reactive borderline adenopathy of the right hilum and mediastinum, mild cardiomegaly, liver cirrhosis.  CT of the head without contrast was negative.  CT abdomen confirmed liver cirrhosis.  She was admitted for further treatment of acute hypoxic respiratory failure as well as bilateral atypical pneumonia.  Upon assessment today, she was at the bedside reported noticing interval improvement in shortness of breath and malaise.  She has remained afebrile since yesterday.  She is currently on supplemental  oxygen therapy of 2 L/min nasal cannula.  Details of recent history were confirmed.  She quit smoking approximately 1.5 months ago.  She reports that since admission, she has noticed some improvement of dyspnea with the nebulizer treatments, but awaited the last treatment as this caused some shakiness.      Review Of Systems:    Review of Systems - History obtained from chart review and the patient  General ROS: negative for - chills, fatigue or fever  Psychological ROS: negative for - anxiety or depression  ENT ROS: Positive-headaches.  Negative for - vocal changes  Allergy and Immunology ROS: negative for - nasal congestion or postnasal drip  Endocrine ROS: negative for - polydipsia/polyuria  Respiratory ROS: positive for - shortness of breath (improving), cough  Cardiovascular ROS: positive for - dyspnea on exertion  negative for - chest pain or edema  Gastrointestinal ROS: negative for - abdominal pain or change in bowel habits  Musculoskeletal ROS: negative for - joint pain or joint swelling  Neurological ROS: no TIA or stroke symptoms        History  Past Medical History:   Diagnosis Date   • Anxiety    • Depression    • Diabetes mellitus (CMS/HCC)    • Hyperlipidemia    • Hypertension    , Past Surgical History:   Procedure Laterality Date   • APPENDECTOMY     • CHOLECYSTECTOMY     • HYSTERECTOMY     • TONSILLECTOMY     , History reviewed. No pertinent family history., Social History     Tobacco Use   • Smoking status: Former Smoker     Packs/day: 0.50   Substance Use Topics   • Alcohol use: Not on file   • Drug use: Not on file   , Medications Prior to Admission   Medication Sig Dispense Refill Last Dose   • atorvastatin (LIPITOR) 40 MG tablet Take 40 mg by mouth Every Night.   3/16/2020 at Unknown time   • clonazePAM (KlonoPIN) 0.5 MG tablet Take 0.5 mg by mouth 3 (Three) Times a Day As Needed for Anxiety.   3/16/2020 at Unknown time   • coenzyme Q10 100 MG capsule Take 200 mg by mouth 2 (Two) Times a Day.    3/16/2020 at Unknown time   • DULoxetine (CYMBALTA) 60 MG capsule Take 60 mg by mouth 2 (Two) Times a Day.   3/16/2020 at Unknown time   • estrogens, conjugated, (PREMARIN) 0.625 MG tablet Take 0.625 mg by mouth Daily.   3/16/2020 at Unknown time   • lactobacillus acidophilus (RISAQUAD) capsule capsule Take 1 capsule by mouth Daily.   3/16/2020 at Unknown time   • metoprolol succinate XL (TOPROL-XL) 50 MG 24 hr tablet Take 50 mg by mouth Daily.   3/16/2020 at Unknown time   • multivitamin (DAILY TERRI) tablet tablet Take 1 tablet by mouth Every Night.   3/16/2020 at Unknown time   • pantoprazole (PROTONIX) 40 MG EC tablet Take 40 mg by mouth 2 (Two) Times a Day.   3/16/2020 at Unknown time   • pregabalin (LYRICA) 150 MG capsule Take 150 mg by mouth 2 (Two) Times a Day. Prior to [facility] Admission, Patient was on: pt states that she is supposed to be on 200 mg 3 times daily, I called and got a list from pharmacy and the only one they had filled for her was 150 mg 2 times daily.   3/16/2020 at Unknown time   • QUEtiapine (SEROquel) 200 MG tablet Take 200 mg by mouth Every Night.   3/16/2020 at Unknown time   • ranolazine (RANEXA) 500 MG 12 hr tablet Take 500 mg by mouth 2 (Two) Times a Day.   3/16/2020 at Unknown time   • albuterol sulfate  (90 Base) MCG/ACT inhaler Inhale 2 puffs Every 4 (Four) Hours As Needed for Wheezing.   Unknown at Unknown time   • insulin regular (HumuLIN R) 500 UNIT/ML patient supplied pump Inject  under the skin into the appropriate area as directed Continuous. Prior to [facility] Admission, Patient was on: basal 2.5u per hour      , Scheduled Meds:    budesonide-formoterol 2 puff Inhalation BID - RT   cefTRIAXone 2 g Intravenous Q24H   doxycycline 100 mg Intravenous Q12H   DULoxetine 60 mg Oral BID   estrogens (conjugated) 0.6 mg Oral Daily   ipratropium-albuterol 3 mL Nebulization 4x Daily - RT   lactobacillus acidophilus 1 capsule Oral Daily   methylPREDNISolone sodium succinate 40 mg  Intravenous Q12H   metoprolol succinate XL 50 mg Oral Daily   multivitamin 1 tablet Oral Nightly   oseltamivir 75 mg Oral Q12H   pantoprazole 40 mg Oral BID   pregabalin 150 mg Oral Q12H   QUEtiapine 200 mg Oral Nightly   ranolazine 500 mg Oral Q12H   sodium chloride 10 mL Intravenous Q12H   , Continuous Infusions:    insulin regular    Pharmacy Consult - Pharmacy to dose    Pharmacy Consult - Pharmacy to dose     and Allergies:  Morphine    Objective     Vital Signs   Temp:  [97.8 °F (36.6 °C)-100.9 °F (38.3 °C)] 98.6 °F (37 °C)  Heart Rate:  [] 92  Resp:  [12-26] 18  BP: (112-162)/(57-96) 127/73    Physical Exam:               GENERAL APPEARANCE: Well developed, well nourished, alert and cooperative, and appears to be in no acute distress. Sitting up at bedside.     HEAD: normocephalic. Atraumatic.     EYES: PERRL, EOMI. vision is grossly intact.    THROAT: Oral cavity and pharynx normal. No inflammation, swelling, exudate, or lesions.     NECK: Neck supple. No thyromegaly.     CARDIAC: Normal S1 and S2. No S3, S4 or murmurs. Rhythm is regular.     RESPIRATORY: Bilateral air entry positive. Bilateral crackles noted. Wheezing present. No rhonchi. Normal rate, effort.     GI: Positive bowel sounds. Soft, nondistended, nontender.     MUSCULOSKELETAL: No significant deformity or joint abnormality. No edema.     NEUROLOGICAL: Strength and sensation symmetric and intact throughout.     PSYCHIATRIC: The mental examination revealed the patient was oriented to person, place, and time.                 Results Review:    LABS:    Lab Results   Component Value Date    GLUCOSE 142 (H) 03/18/2020    BUN 10 03/18/2020    CREATININE 0.70 03/18/2020    EGFRIFNONA 89 03/18/2020    BCR 14.3 03/18/2020    CO2 22.5 03/18/2020    CALCIUM 8.2 (L) 03/18/2020    ALBUMIN 3.16 (L) 03/18/2020     (H) 03/18/2020    ALT 38 (H) 03/18/2020    WBC 3.59 03/18/2020    HGB 13.2 03/18/2020    HCT 40.6 03/18/2020    .0 (H)  03/18/2020    PLT 60 (L) 03/18/2020     03/18/2020    K 4.0 03/18/2020     03/18/2020    ANIONGAP 14.5 03/18/2020       Lab Results   Component Value Date    INR 1.15 (H) 03/18/2020    PROTIME 15.3 03/18/2020       Results from last 7 days   Lab Units 03/18/20  0407   INR  1.15*                     I reviewed the patient's new clinical results.  I reviewed the patient's new imaging results and agree with the interpretation.        Erika London PA-C  03/18/20  14:23      Scribed for Dr. Luciano by Erika London PA-C      I have reviewed the past medical history, past surgical history, family history and social history the patient.    I have reviewed the labs.  ABG showed pH of 7.38, PCO2 of 46 and PO2 of 52 on room air.  Cardiac troponin normal.  Elevated serum lactate level.  C-reactive protein elevated.  Uptrending liver enzymes.  Thrombocytopenia with lymphopenia.  Blood cultures are negative till date.  Legionella urine antigen negative.  MRSA DNA probe negative.  Respiratory viral panel positive for influenza H1.  I have reviewed the chest x-ray me that was performed on 3/17/2020 which showed no pneumothorax.  No effusion.  I have reviewed the images of the CT scan of the chest with contrast that was performed on 3/17/2020 which showed centrilobular nodules of right upper lobe and right lower lobe with minimal centrilobular nodule in the left lower lobe consistent with atypical pneumonia.  Reactive lymphadenopathy in the right hilum.  Liver cirrhosis noted.  I have reviewed the EKG report that was performed on 3/17/2020 normal sinus rhythm.  QTC of 459 ms.      Assessment and plan  · Hypoxia requiring supplemental oxygen  · Bilateral pneumonia, right lung greater than left due to influenza  · Acute exacerbation of COPD        Ordered ABG  Titrate FiO2 to keep SPO2 around 90%  On IV ceftriaxone and IV doxycycline  On p.o. Tamiflu  Patient is currently on SCDs as per primary team.  DVT prophylaxis  as per primary team  On IV steroids with duo nebs.  I started the patient on Symbicort nebs.  In case of worsening of clinical condition, I recommend escalating the antibiotics to include coverage for MRSA and Pseudomonas.  In that situation due to thrombocytopenia, I recommend infectious disease team opinion over antibiotic choice.        VTE prophylaxis  Mechanical Order History:      Ordered        03/17/20 1559  Place Sequential Compression Device  Once         03/17/20 1559  Maintain Sequential Compression Device  Continuous                 Pharmalogical Order History:     Ordered     Dose Route Frequency Stop    03/17/20 1540  heparin (porcine) 5000 UNIT/ML injection 5,000 Units  Status:  Discontinued      5,000 Units SC Every 12 Hours Scheduled 03/17/20 1559                 IAndry M.D. attest that the above note accurately reflects the work and decisions made by me.  Patient was seen and evaluated by me, including history of present illness, physical exam, assessment, and treatment plan.  The above note was reviewed and edited by me.    Thank you for involving us in the care of the patient.  Andry Luciano M.D  Pulmonary and Critical Care Medicine

## 2020-03-18 NOTE — PLAN OF CARE
Problem: Fall Risk (Adult)  Goal: Identify Related Risk Factors and Signs and Symptoms  Outcome: Ongoing (interventions implemented as appropriate)  Flowsheets (Taken 3/17/2020 1558 by Sonja Gresham RN)  Related Risk Factors (Fall Risk): environment unfamiliar  Signs and Symptoms (Fall Risk): presence of risk factors  Goal: Absence of Fall  Outcome: Ongoing (interventions implemented as appropriate)  Flowsheets (Taken 3/17/2020 1558 by Sonja Gresham RN)  Absence of Fall: making progress toward outcome     Problem: Patient Care Overview  Goal: Plan of Care Review  Outcome: Ongoing (interventions implemented as appropriate)  Goal: Individualization and Mutuality  Outcome: Ongoing (interventions implemented as appropriate)  Goal: Discharge Needs Assessment  Outcome: Ongoing (interventions implemented as appropriate)  Flowsheets (Taken 3/17/2020 2788 by Sonja Gresham, RN)  Equipment Currently Used at Home: cane, straight  Transportation Anticipated: family or friend will provide  Transportation Concerns: car, none  Patient/Family Anticipated Services at Transition: none  Patient/Family Anticipates Transition to: home with family  Goal: Interprofessional Rounds/Family Conf  Outcome: Ongoing (interventions implemented as appropriate)     Problem: Pneumonia (Adult)  Goal: Signs and Symptoms of Listed Potential Problems Will be Absent, Minimized or Managed (Pneumonia)  Outcome: Ongoing (interventions implemented as appropriate)  Flowsheets (Taken 3/17/2020 8868 by Sonja Gresham, RN)  Problems Assessed (Pneumonia): all  Problems Present (Pneumonia): infection progression;respiratory compromise     Problem: Breathing Pattern Ineffective (Adult)  Goal: Identify Related Risk Factors and Signs and Symptoms  Outcome: Ongoing (interventions implemented as appropriate)  Flowsheets (Taken 3/17/2020 1558 by Sonja Gresham RN)  Related Risk Factors (Breathing Pattern Ineffective):  fatigue;infection;obesity;smoking;underlying condition  Signs and Symptoms (Breathing Pattern Ineffective): accessory muscle use;breath sounds abnormal;breathing pattern altered;activity intolerance;breathlessness  Goal: Effective Oxygenation/Ventilation  Outcome: Ongoing (interventions implemented as appropriate)  Flowsheets (Taken 3/17/2020 7608 by Sonja Gresham, RN)  Effective Oxygenation/Ventilation: making progress toward outcome  Goal: Anxiety/Fear Reduction  Outcome: Ongoing (interventions implemented as appropriate)  Flowsheets (Taken 3/17/2020 1552 by Sonja Gresham, RN)  Anxiety/Fear Reduction: making progress toward outcome

## 2020-03-18 NOTE — PROGRESS NOTES
Discharge Planning Assessment   [place]     Patient Name: Tiki Campos  MRN: [id number]  Today's Date: 3/18/2020    Admit Date: 3/17/2020    Discharge Needs Assessment     Row Name 03/18/20 0900       Living Environment    Lives With  alone    Provides Primary Care For  no one    Family Caregiver if Needed  sibling(s) Sister.       Resource/Environmental Concerns    Transportation Concerns  car, none Her sister provides transportation.       Transition Planning    Patient/Family Anticipates Transition to  home       Discharge Needs Assessment    Readmission Within the Last 30 Days  no previous admission in last 30 days    Equipment Currently Used at Home  bipap/cpap;wheelchair;walker, rolling DME per unknown provider.    Equipment Needed After Discharge  -- May need O2 at dc.        Discharge Plan     Row Name 03/18/20 0904       Plan    Plan  Patient came to ED from home where she lives alone. Her sister provides transportation at dc and provides transportation to her MD appts. She has a CPAP, wheelchair and a rolling walker at home. She doesn't remember name of DME provider. If home O2 is needed it can be arranged with MD order if pt qualifies. She has [employer] PPO coverage and does not have copay concerns. Dr. Recinos is her PCP. She utilizes [facility] to obtain her medications. Pneumonia edu and Diab edu have been consulted. She plans to return home alone with her sister assisting as needed. CM will follow and assist as needed.    Patient/Family in Agreement with Plan  yes        Destination      Coordination has not been started for this encounter.      Durable Medical Equipment      Coordination has not been started for this encounter.      Dialysis/Infusion      Coordination has not been started for this encounter.      Home Medical Care      Coordination has not been started for this encounter.      Therapy      Coordination has not been started for this encounter.      [facility]       Coordination has not been started for this encounter.          Demographic Summary     Row Name 03/18/20 0859       General Information    Admission Type  inpatient    Preferred Language  English     Used During This Interaction  no        Functional Status    No documentation.       Psychosocial    No documentation.       Abuse/Neglect    No documentation.       Legal    No documentation.       Substance Abuse    No documentation.       Patient Forms    No documentation.           Acacia Dee RN

## 2020-03-18 NOTE — PROGRESS NOTES
I have reviewed the past medical history, past surgical history, family history and social history the patient.    I have reviewed the labs.  ABG showed pH of 7.38, PCO2 of 46 and PO2 of 52 on room air.  Cardiac troponin normal.  Elevated serum lactate level.  C-reactive protein elevated.  Uptrending liver enzymes.  Thrombocytopenia with lymphopenia.  Blood cultures are negative till date.  Legionella urine antigen negative.  MRSA DNA probe negative.  Respiratory viral panel positive for influenza H1.  I have reviewed the chest x-ray me that was performed on 3/17/2020 which showed no pneumothorax.  No effusion.  I have reviewed the images of the CT scan of the chest with contrast that was performed on 3/17/2020 which showed centrilobular nodules of right upper lobe and right lower lobe with minimal centrilobular nodule in the left lower lobe consistent with atypical pneumonia.  Reactive lymphadenopathy in the right hilum.  Liver cirrhosis noted.  I have reviewed the EKG report that was performed on 3/17/2020 normal sinus rhythm.  QTC of 459 ms.      Assessment and plan  · Hypoxia requiring supplemental oxygen  · Bilateral pneumonia, right lung greater than left due to influenza  · Acute exacerbation of COPD        Ordered ABG  Titrate FiO2 to keep SPO2 around 90%  On IV ceftriaxone and IV doxycycline  On p.o. Tamiflu  Patient is currently on SCDs as per primary team.  DVT prophylaxis as per primary team  On IV steroids with duo nebs.  I started the patient on Symbicort nebs.  In case of worsening of clinical condition, I recommend escalating the antibiotics to include coverage for MRSA and Pseudomonas.  In that situation due to thrombocytopenia, I recommend infectious disease team opinion over antibiotic choice.  Thrombocytopenia management as per primary team     VTE prophylaxis  Mechanical Order History:      Ordered        03/17/20 0361  Place Sequential Compression Device  Once         03/17/20 2444  Maintain  Sequential Compression Device  Continuous                 Pharmalogical Order History:     Ordered     Dose Route Frequency Stop    03/17/20 1540  heparin (porcine) 5000 UNIT/ML injection 5,000 Units  Status:  Discontinued      5,000 Units SC Every 12 Hours Scheduled 03/17/20 5152                 Andry FELICIANO M.D. attest that the above note accurately reflects the work and decisions made by me.  Patient was seen and evaluated by me, including history of present illness, physical exam, assessment, and treatment plan.  The above note was reviewed and edited by me.    Thank you for involving us in the care of the patient.  Andry Luciano M.D  Pulmonary and Critical Care Medicine

## 2020-03-18 NOTE — PROGRESS NOTES
Assisted By: Alize PATRICK    CC: Follow-up on influenza    Interview History/HPI: Patient states she is feeling a little better, less shortness of breath, headache is worse when she coughs but she only took 1 shot of Toradol last p.m.  Appetite is poor, no abdominal pain however she does not think she is having any edema, she does feel like the room is side and she is still having significant coughing paroxysms.      Vitals:    03/18/20 0835   BP: 134/68   Pulse: 90   Resp:    Temp:    SpO2:          Intake/Output Summary (Last 24 hours) at 3/18/2020 0841  Last data filed at 3/18/2020 0500  Gross per 24 hour   Intake 730 ml   Output 1000 ml   Net -270 ml       EXAM: She is now afebrile, T-max overnight 100.9, 20, 90, 98.5.  Overall she does appear to feel better hearing is intact pupils equal face symmetric speech normal lungs have bilateral breath sounds diminished bases but no wheezing heard today no crackles heart is a regular rate and rhythm without murmur rub or gallop abdomen is rounded soft nontender extremities are without edema skin warm and dry she can speak in full sentences without difficulty and even discussed going home.      Tele: Sinus rhythm    LABS:   Lab Results (last 48 hours)     Procedure Component Value Units Date/Time    Hemoglobin A1c [[id number]]  (Abnormal) Collected:  03/18/20 0407    Specimen:  Blood Updated:  03/18/20 0557     Hemoglobin A1C 7.50 %     Narrative:       Hemoglobin A1C Ranges:    Increased Risk for Diabetes  5.7% to 6.4%  Diabetes                     >= 6.5%  Diabetic Goal                < 7.0%    POC Glucose Once [[id number]]  (Abnormal) Collected:  03/18/20 0525    Specimen:  Blood Updated:  03/18/20 0553     Glucose 144 mg/dL     Procalcitonin [[id number]]  (Normal) Collected:  03/17/20 1559    Specimen:  Blood Updated:  03/18/20 0550     Procalcitonin 0.12 ng/mL     Narrative:       As a Marker for Sepsis (Non-Neonates):   1. <0.5 ng/mL represents a low risk of severe sepsis  "and/or septic shock.  1. >2 ng/mL represents a high risk of severe sepsis and/or septic shock.    As a Marker for Lower Respiratory Tract Infections that require antibiotic therapy:  PCT on Admission     Antibiotic Therapy             6-12 Hrs later  > 0.5                Strongly Recommended            >0.25 - <0.5         Recommended  0.1 - 0.25           Discouraged                   Remeasure/reassess PCT  <0.1                 Strongly Discouraged          Remeasure/reassess PCT      As 28 day mortality risk marker: \"Change in Procalcitonin Result\" (> 80 % or <=80 %) if Day 0 (or Day 1) and Day 4 values are available. Refer to http://[email or web address]-[email or web address]/   Change in PCT <=80 %   A decrease of PCT levels below or equal to 80 % defines a positive change in PCT test result representing a higher risk for 28-day all-cause mortality of patients diagnosed with severe sepsis or septic shock.  Change in PCT > 80 %   A decrease of PCT levels of more than 80 % defines a negative change in PCT result representing a lower risk for 28-day all-cause mortality of patients diagnosed with severe sepsis or septic shock.                Results may be falsely decreased if patient taking Biotin.     Lactic Acid, Plasma [[id number]]  (Abnormal) Collected:  03/18/20 0407    Specimen:  Blood Updated:  03/18/20 0452     Lactate 2.1 mmol/L     Troponin [[id number]]  (Normal) Collected:  03/18/20 0407    Specimen:  Blood Updated:  03/18/20 0451     Troponin T <0.010 ng/mL     Narrative:       Troponin T Reference Range:  <= 0.03 ng/mL-   Negative for AMI  >0.03 ng/mL-     Abnormal for myocardial necrosis.  Clinicians would have to utilize clinical acumen, EKG, Troponin and serial changes to determine if it is an Acute Myocardial Infarction or myocardial injury due to an underlying chronic condition.       Results may be falsely decreased if patient taking Biotin.      Comprehensive Metabolic Panel [[id number]]  (Abnormal) Collected:  " 03/18/20 0407    Specimen:  Blood Updated:  03/18/20 0448     Glucose 142 mg/dL      BUN 10 mg/dL      Creatinine 0.70 mg/dL      Sodium 137 mmol/L      Potassium 4.0 mmol/L      Chloride 100 mmol/L      CO2 22.5 mmol/L      Calcium 8.2 mg/dL      Total Protein 6.7 g/dL      Albumin 3.16 g/dL      ALT (SGPT) 38 U/L      AST (SGOT) 112 U/L      Alkaline Phosphatase 82 U/L      Total Bilirubin 0.5 mg/dL      eGFR Non African Amer 89 mL/min/1.73      Globulin 3.5 gm/dL      A/G Ratio 0.9 g/dL      BUN/Creatinine Ratio 14.3     Anion Gap 14.5 mmol/L     Narrative:       GFR Normal >60  Chronic Kidney Disease <60  Kidney Failure <15      C-reactive Protein [[id number]]  (Abnormal) Collected:  03/18/20 0407    Specimen:  Blood Updated:  03/18/20 0448     C-Reactive Protein 5.80 mg/dL     Protime-INR [[id number]]  (Abnormal) Collected:  03/18/20 0407    Specimen:  Blood Updated:  03/18/20 0446     Protime 15.3 Seconds      INR 1.15    Narrative:       Suggested INR therapeutic range for stable oral anticoagulant therapy:    Low Intensity therapy:   1.5-2.0  Moderate Intensity therapy:   2.0-3.0  High Intensity therapy:   2.5-4.0    CBC & Differential [[id number]] Collected:  03/18/20 0407    Specimen:  Blood Updated:  03/18/20 0430    Narrative:       The following orders were created for panel order CBC & Differential.  Procedure                               Abnormality         Status                     ---------                               -----------         ------                     CBC Auto Differential[[id number]]        Abnormal            Final result                 Please view results for these tests on the individual orders.    CBC Auto Differential [[id number]]  (Abnormal) Collected:  03/18/20 0407    Specimen:  Blood Updated:  03/18/20 0430     WBC 3.59 10*3/mm3      RBC 4.02 10*6/mm3      Hemoglobin 13.2 g/dL      Hematocrit 40.6 %      .0 fL      MCH 32.8 pg      MCHC 32.5 g/dL      RDW 13.4 %       RDW-SD 50.4 fl      MPV 12.0 fL      Platelets 60 10*3/mm3      Neutrophil % 71.6 %      Lymphocyte % 17.5 %      Monocyte % 10.3 %      Eosinophil % 0.0 %      Basophil % 0.3 %      Immature Grans % 0.3 %      Neutrophils, Absolute 2.57 10*3/mm3      Lymphocytes, Absolute 0.63 10*3/mm3      Monocytes, Absolute 0.37 10*3/mm3      Eosinophils, Absolute 0.00 10*3/mm3      Basophils, Absolute 0.01 10*3/mm3      Immature Grans, Absolute 0.01 10*3/mm3      nRBC 0.0 /100 WBC     Vitamin B12 [[id number]] Collected:  03/18/20 0407    Specimen:  Blood Updated:  03/18/20 0426    Folate [[id number]] Collected:  03/18/20 0407    Specimen:  Blood Updated:  03/18/20 0426    Troponin [[id number]]  (Normal) Collected:  03/17/20 2321    Specimen:  Blood Updated:  03/17/20 2351     Troponin T <0.010 ng/mL     Narrative:       Troponin T Reference Range:  <= 0.03 ng/mL-   Negative for AMI  >0.03 ng/mL-     Abnormal for myocardial necrosis.  Clinicians would have to utilize clinical acumen, EKG, Troponin and serial changes to determine if it is an Acute Myocardial Infarction or myocardial injury due to an underlying chronic condition.       Results may be falsely decreased if patient taking Biotin.      POC Glucose Once [[id number]]  (Abnormal) Collected:  03/17/20 2112    Specimen:  Blood Updated:  03/17/20 2121     Glucose 134 mg/dL     MRSA Screen, PCR - Swab, Nares [[id number]]  (Normal) Collected:  03/17/20 1623    Specimen:  Swab from Nares Updated:  03/17/20 1830     MRSA PCR Negative     Staph aureus by PCR Negative    Troponin [[id number]]  (Normal) Collected:  03/17/20 1648    Specimen:  Blood Updated:  03/17/20 1712     Troponin T <0.010 ng/mL     Narrative:       Troponin T Reference Range:  <= 0.03 ng/mL-   Negative for AMI  >0.03 ng/mL-     Abnormal for myocardial necrosis.  Clinicians would have to utilize clinical acumen, EKG, Troponin and serial changes to determine if it is an Acute Myocardial Infarction or myocardial  injury due to an underlying chronic condition.       Results may be falsely decreased if patient taking Biotin.      Legionella Antigen, Urine - Urine, Urine, Clean Catch [[id number]]  (Normal) Collected:  03/17/20 0949    Specimen:  Urine, Clean Catch Updated:  03/17/20 1647     LEGIONELLA ANTIGEN, URINE Negative    Narrative:       Presumptive negative for L. pneumophilia serogroup 1 antigen, suggesting no recent or current infection.    POC Glucose Once [[id number]]  (Normal) Collected:  03/17/20 1622    Specimen:  Blood Updated:  03/17/20 1641     Glucose 88 mg/dL     Pregnancy, Urine - Urine, Clean Catch [[id number]]  (Normal) Collected:  03/17/20 0949    Specimen:  Urine, Clean Catch Updated:  03/17/20 1616     HCG, Urine QL Negative    Narrative:       Diluted specimens may cause false negative results.    C-reactive Protein [[id number]]  (Abnormal) Collected:  03/17/20 0923    Specimen:  Blood from Arm, Left Updated:  03/17/20 1606     C-Reactive Protein 2.46 mg/dL     Lactic Acid, Reflex [[id number]]  (Abnormal) Collected:  03/17/20 1331    Specimen:  Blood from Arm, Right Updated:  03/17/20 1405     Lactate 2.2 mmol/L     Ferritin [[id number]]  (Normal) Collected:  03/17/20 0923    Specimen:  Blood from Arm, Left Updated:  03/17/20 1402     Ferritin 146.30 ng/mL     Narrative:       Results may be falsely decreased if patient taking Biotin.      Lactic Acid, Reflex Timer (This will reflex a repeat order 3-3:15 hours after ordered.) [[id number]] Collected:  03/17/20 0923    Specimen:  Blood from Arm, Left Updated:  03/17/20 1301     Hold Tube Hold for add-ons.     Comment: Auto resulted.       Respiratory Panel, PCR - Swab, Nasopharynx [[id number]]  (Abnormal) Collected:  03/17/20 0921    Specimen:  Swab from Nasopharynx Updated:  03/17/20 1050     ADENOVIRUS, PCR Not Detected     Coronavirus 229E Not Detected     Coronavirus HKU1 Not Detected     Coronavirus NL63 Not Detected     Coronavirus OC43 Not Detected      Human Metapneumovirus Not Detected     Human Rhinovirus/Enterovirus Not Detected     Influenza B PCR Not Detected     Parainfluenza Virus 1 Not Detected     Parainfluenza Virus 2 Not Detected     Parainfluenza Virus 3 Not Detected     Parainfluenza Virus 4 Not Detected     Bordetella pertussis pcr Not Detected     Influenza A H1 2009 PCR Detected     Chlamydophila pneumoniae PCR Not Detected     Mycoplasma pneumo by PCR Not Detected     Influenza A PCR Not Detected     Influenza A H3 Not Detected     Influenza A H1 Not Detected     RSV, PCR Not Detected    Narrative:       The coronavirus on the RVP is NOT COVID-19 and is NOT indicative of infection with COVID-19.     Urinalysis With Microscopic If Indicated (No Culture) - Urine, Clean Catch [[id number]]  (Abnormal) Collected:  03/17/20 0949    Specimen:  Urine, Clean Catch Updated:  03/17/20 1008     Color, UA Dark Yellow     Appearance, UA Clear     pH, UA 8.0     Specific Gravity, UA >=1.030     Glucose, UA Negative     Ketones, UA Trace     Bilirubin, UA Small (1+)     Blood, UA Large (3+)     Protein, UA Trace     Leuk Esterase, UA Trace     Nitrite, UA Negative     Urobilinogen, UA 4.0 E.U./dL    Urinalysis, Microscopic Only - Urine, Clean Catch [[id number]]  (Abnormal) Collected:  03/17/20 0949    Specimen:  Urine, Clean Catch Updated:  03/17/20 1008     RBC, UA Too Numerous to Count /HPF      WBC, UA 0-2 /HPF      Bacteria, UA None Seen /HPF      Squamous Epithelial Cells, UA 3-6 /HPF      Hyaline Casts, UA 3-6 /LPF      Methodology Automated Microscopy    Lactic Acid, Plasma [[id number]]  (Abnormal) Collected:  03/17/20 0923    Specimen:  Blood from Arm, Left Updated:  03/17/20 1000     Lactate 2.4 mmol/L     Blood Culture - Blood, Arm, Right [[id number]] Collected:  03/17/20 0949    Specimen:  Blood from Arm, Right Updated:  03/17/20 0958    Comprehensive Metabolic Panel [[id number]]  (Abnormal) Collected:  03/17/20 0923    Specimen:  Blood from Arm, Left  Updated:  03/17/20 0957     Glucose 167 mg/dL      BUN 8 mg/dL      Creatinine 0.77 mg/dL      Sodium 138 mmol/L      Potassium 4.1 mmol/L      Chloride 100 mmol/L      CO2 24.9 mmol/L      Calcium 8.5 mg/dL      Total Protein 7.1 g/dL      Albumin 3.59 g/dL      ALT (SGPT) 28 U/L      AST (SGOT) 63 U/L      Alkaline Phosphatase 90 U/L      Total Bilirubin 0.7 mg/dL      eGFR Non African Amer 80 mL/min/1.73      Globulin 3.5 gm/dL      A/G Ratio 1.0 g/dL      BUN/Creatinine Ratio 10.4     Anion Gap 13.1 mmol/L     Narrative:       GFR Normal >60  Chronic Kidney Disease <60  Kidney Failure <15      Troponin [[id number]]  (Normal) Collected:  03/17/20 0923    Specimen:  Blood from Arm, Left Updated:  03/17/20 0957     Troponin T <0.010 ng/mL     Narrative:       Troponin T Reference Range:  <= 0.03 ng/mL-   Negative for AMI  >0.03 ng/mL-     Abnormal for myocardial necrosis.  Clinicians would have to utilize clinical acumen, EKG, Troponin and serial changes to determine if it is an Acute Myocardial Infarction or myocardial injury due to an underlying chronic condition.       Results may be falsely decreased if patient taking Biotin.      BNP [[id number]]  (Normal) Collected:  03/17/20 0923    Specimen:  Blood from Arm, Left Updated:  03/17/20 0955     proBNP 203.1 pg/mL     Narrative:       Among patients with dyspnea, NT-proBNP is highly sensitive for the detection of acute congestive heart failure. In addition NT-proBNP of <300 pg/ml effectively rules out acute congestive heart failure with 99% negative predictive value.    Results may be falsely decreased if patient taking Biotin.      CBC & Differential [[id number]] Collected:  03/17/20 0923    Specimen:  Blood from Arm, Left Updated:  03/17/20 0933    Narrative:       The following orders were created for panel order CBC & Differential.  Procedure                               Abnormality         Status                     ---------                                -----------         ------                     CBC Auto Differential[[id number]]        Abnormal            Final result                 Please view results for these tests on the individual orders.    CBC Auto Differential [[id number]]  (Abnormal) Collected:  03/17/20 0923    Specimen:  Blood from Arm, Left Updated:  03/17/20 0933     WBC 4.83 10*3/mm3      RBC 4.22 10*6/mm3      Hemoglobin 13.8 g/dL      Hematocrit 42.5 %      .7 fL      MCH 32.7 pg      MCHC 32.5 g/dL      RDW 13.3 %      RDW-SD 49.5 fl      MPV 11.6 fL      Platelets 79 10*3/mm3      Neutrophil % 74.2 %      Lymphocyte % 10.1 %      Monocyte % 14.9 %      Eosinophil % 0.2 %      Basophil % 0.4 %      Immature Grans % 0.2 %      Neutrophils, Absolute 3.58 10*3/mm3      Lymphocytes, Absolute 0.49 10*3/mm3      Monocytes, Absolute 0.72 10*3/mm3      Eosinophils, Absolute 0.01 10*3/mm3      Basophils, Absolute 0.02 10*3/mm3      Immature Grans, Absolute 0.01 10*3/mm3      nRBC 0.0 /100 WBC     Blood Culture - Blood, Arm, Left [[id number]] Collected:  03/17/20 0923    Specimen:  Blood from Arm, Left Updated:  03/17/20 0928    Blood Gas, Arterial With Co-Ox [[id number]]  (Abnormal) Collected:  03/17/20 0911    Specimen:  Arterial Blood Updated:  03/17/20 0917     Site Left Radial     Neal's Test Positive     pH, Arterial 7.385 pH units      pCO2, Arterial 46.7 mm Hg      pO2, Arterial 52.4 mm Hg      Comment: 85 Value below critical limit        HCO3, Arterial 27.9 mmol/L      Comment: 83 Value above reference range        Base Excess, Arterial 2.2 mmol/L      O2 Saturation, Arterial 87.8 %      Comment: 84 Value below reference range        Hemoglobin, Blood Gas 13.9 g/dL      Hematocrit, Blood Gas 42.6 %      Oxyhemoglobin 85.1 %      Comment: 84 Value below reference range        Methemoglobin 0.20 %      Carboxyhemoglobin 2.9 %      A-a Gradiant 39.0 mmHg      CO2 Content 29.3 mmol/L      Temperature 0.0 C      Barometric Pressure for Blood  Gas 733 mmHg      Modality Room Air     FIO2 21 %      Ventilator Mode NA     Note --     Notified Jose BEAL NP AND ROBINSON PATRICK, ER     Notified By [id number]     Notified Time 03/17/2020 09:20     Collected by [id number]     Comment: Meter: [id number]     :  [id number]        pH, Temp Corrected --     pCO2, Temperature Corrected --     pO2, Temperature Corrected --          Radiology:    Imaging Results (Last 72 Hours)     Procedure Component Value Units Date/Time    CT Head Without Contrast [[id number]] Collected:  03/17/20 1642     Updated:  03/17/20 1645    Narrative:       EXAMINATION: CT HEAD WO CONTRAST-      CLINICAL INDICATION:     HA; J10.1-Influenza due to other identified  influenza virus with other respiratory manifestations; J18.9-Pneumonia,  unspecified organism     COMPARISON:    None     Technique: Multiple CT axial images were obtained through the level of  the brain without IV contrast administration. Reformatted images in the  coronal and/or sagittal plane(s) were generated from the axial data set  to facilitate diagnostic accuracy and/or surgical planning.     Radiation dose reduction techniques were utilized per ALARA protocol.  Automated exposure control was initiated through either or CareDose or  DoseRight software packages by  protocol.       DOSE (DLP mGy-cm):     FINDINGS:     Brain: Unremarkable. No parenchymal hemorrhage or mass. No white matter  abnormality. No areas of mass effect.  Ventricles: Unremarkable. No hydrocephalus.  Extra-axial spaces: Unremarkable. No extra-axial hemorrhage. No  extra-axial masses.  Bones: Unremarkable. No acute fracture identified.  Sinuses: Unremarkable as visualized. No air-fluid levels.  Mastoids: Unremarkable. No mastoid effusions.  Soft Tissues: Unremarkable.          Impression:       No CT evidence of acute intracranial abnormality.     This report was finalized on 3/17/2020 4:43 PM by Dr. Mika Vasquez MD.       XR Chest 1 View  [[id number]] Collected:  03/17/20 1011     Updated:  03/17/20 1156    Narrative:       EXAMINATION: XR CHEST 1 VW-      CLINICAL INDICATION:     cough, sob     TECHNIQUE:  XR CHEST 1 VW-      COMPARISON: 10/10/2015      FINDINGS:      Lungs are aerated.   Heart size, mediastinum, and pulmonary vascularity are unremarkable.   No pneumothorax.   No effusions.   No acute osseous findings.          Impression:       No radiographic evidence of acute cardiac or pulmonary  disease.     This report was finalized on 3/17/2020 10:19 AM by Dr. Mika Vasquez MD.       CT Abdomen Pelvis Stone Protocol [[id number]] Collected:  03/17/20 1150     Updated:  03/17/20 1156    Narrative:       EXAM: CT ABDOMEN PELVIS STONE PROTOCOL-            TECHNIQUE: Multiple axial CT images were obtained from lung bases  through pubic symphysis WITHOUT administration of IV contrast.  Reformatted images in the coronal and/or sagittal plane(s) were  generated from the axial data set to facilitate diagnostic accuracy  and/or surgical planning.  Oral Contrast:NONE.     Radiation dose reduction techniques were utilized per ALARA protocol.  Automated exposure control was initiated through either or [employer] or  [employer] software packages by  protocol.       DOSE:     Clinical information  Hematuria      Comparison  Comparison: 02/10/2020     Findings  LOWER THORAX: STABLE MILD CARDIOMEGALY. CENTRILOBULAR NODULES RIGHT  LOWER LOBE COMPATIBLE WITH ATYPICAL PNEUMONIA. NO CONSOLIDATIVE  PNEUMONIA IDENTIFIED.     ABDOMEN:        LIVER: LIVER CIRRHOSIS CHANGES ARE AGAIN NOTED WITH NO FOCAL LIVER  LESION OR PERIHEPATIC ASCITES IDENTIFIED.        GALLBLADDER: CHOLECYSTECTOMY.        PANCREAS: Unremarkable. No mass or ductal dilatation.        SPLEEN: BORDERLINE ENLARGEMENT OF THE SPLEEN IS STABLE.        ADRENALS: No mass.        KIDNEYS/URETERS: NO RENAL OR URETERAL STONES OR HYDRONEPHROSIS NOTED.        GI TRACT: Non-dilated. No definite wall  thickening.        PERITONEUM: No free air. No free fluid or loculated fluid  collections.        MESENTERY: Unremarkable.        LYMPH NODES: PROMINENT AND LIKELY REACTIVE MILDLY ENLARGED CIERA  HEPATIS AND UPPER ABDOMINAL LYMPH NODES.        VASCULATURE: STABLE MILD ATHEROSCLEROSIS.        ABDOMINAL WALL: No focal hernia or mass.           OTHER: None.     PELVIS:        BLADDER: URINARY BLADDER IS INCOMPLETELY DISTENDED BUT IS OTHERWISE  UNREMARKABLE IN APPEARANCE.        REPRODUCTIVE: HYSTERECTOMY.        APPENDIX: APPENDECTOMY.     BONES: DEGENERATIVE CHANGES LUMBAR SPINE BUT NO ACUTE BONY FINDINGS  IDENTIFIED.       Impression:       Impression:  1. Right lower lobe atypical pneumonia.  2. Liver cirrhosis. No significant ascites.  3. No renal or ureteral stones. No hydronephrosis.  4. Other nonacute/incidental findings as above.     This report was finalized on 3/17/2020 11:52 AM by Dr. Mika Vasquez MD.       CT Chest With Contrast [[id number]] Collected:  03/17/20 1152     Updated:  03/17/20 1156    Narrative:       EXAM: CT CHEST W CONTRAST-            CLINICAL INDICATION:cough, sob      COMPARISON: NONE.     TECHNIQUE: Multiple axial CT images were obtained from lung apex through  upper abdomen WITH administration of IV contrast. Reformatted images in  the coronal and/or sagittal plane(s) were generated from the axial data  set to facilitate diagnostic accuracy and/or surgical planning.     Radiation dose reduction techniques were utilized per ALARA protocol.  Automated exposure control was initiated through either or CareDose or  Dose[employer] software packages by  protocol.    DOSE (DLP mGy-cm):        FINDINGS:     LUNGS: CENTRILOBULAR NODULES OF THE RIGHT UPPER LOBE AND RIGHT LOWER  LOBE AND MINIMAL CENTRILOBULAR NODULES IN THE LEFT LOWER LOBE SUPERIOR  SEGMENT MOST CONSISTENT WITH RIGHT GREATER THAN LEFT BILATERAL ATYPICAL  PNEUMONIA. NO LOBAR CONSOLIDATION IDENTIFIED.     HEART: MILD  CARDIOMEGALY IS NOTED.     PERICARDIUM: No effusion.     MEDIASTINUM: PROBABLE REACTIVE LYMPH NODES IN THE RIGHT HILUM AND  MEDIASTINUM BUT NO BULKY ADENOPATHY IDENTIFIED.     PLEURA: No pleural effusion. No pleural mass or abnormal calcification.  No pneumothorax.     MAJOR AIRWAYS: Clear. No intrinsic mass.     VASCULATURE: No evidence of aneurysm.     VISUALIZED UPPER ABDOMEN:LIVER CIRRHOSIS AND MILD SPLENOMEGALY. PRIOR  CHOLECYSTECTOMY.     OTHER: None.     BONES: DEGENERATIVE CHANGES THORACIC SPINE BUT NO ACUTE BONY FINDINGS  IDENTIFIED.       Impression:       1. Bilateral right greater than left centrilobular nodularity as  detailed above most consistent with bilateral atypical pneumonia.  2. Probable reactive borderline adenopathy of the right hilum and  mediastinum. No bulky adenopathy identified.  3. Mild cardiomegaly.  4. Liver cirrhosis. Other nonacute findings as above.     This report was finalized on 3/17/2020 11:53 AM by Dr. Mika Vasquez MD.                  Assessment/Plan:   Severe sepsis secondary to H1 N1 influenza.  She has associated pneumonia.  MRSA swab was negative, her platelets have dropped further, I am stopping vancomycin, I have added doxycycline continue Rocephin, CRP is drifted up slightly but will follow.  White count has gone down.  With this and the platelets going down however this may be secondary to splenomegaly or bone marrow suppression from her overall infectious process.      Hypoxic respiratory failure, appears to be stable, on 2 L nasal cannula with adequate saturations.  Wheezing is significantly improved.  Feel patient is stable to transfer to the floor.    Diabetes, patient has her insulin pump in place, I have stopped sliding scale insulin but continue to monitor glucoses.  Glucoses are controlled, A1c acceptable/improved at 7.5.    Cirrhosis, can have outpatient work-up INR was almost normal    Morbid obesity by BMI, stable    Elevated transaminases, this is new  for her although she has a history of cirrhosis, most likely related to viral illness.  I am holding her Lipitor.    DVT prophylaxis, SCUDs, not using anticoagulants secondary to thrombocytopenia    Noted repeat EKG yesterday was unremarkable troponins negative.  No chest pain.

## 2020-03-19 LAB
ALBUMIN SERPL-MCNC: 3.1 G/DL (ref 3.5–5.2)
ALBUMIN/GLOB SERPL: 0.9 G/DL
ALP SERPL-CCNC: 78 U/L (ref 39–117)
ALT SERPL W P-5'-P-CCNC: 38 U/L (ref 1–33)
ANION GAP SERPL CALCULATED.3IONS-SCNC: 11.4 MMOL/L (ref 5–15)
AST SERPL-CCNC: 101 U/L (ref 1–32)
BASOPHILS # BLD AUTO: 0 10*3/MM3 (ref 0–0.2)
BASOPHILS NFR BLD AUTO: 0 % (ref 0–1.5)
BILIRUB SERPL-MCNC: 0.4 MG/DL (ref 0.2–1.2)
BUN BLD-MCNC: 14 MG/DL (ref 6–20)
BUN/CREAT SERPL: 17.5 (ref 7–25)
CALCIUM SPEC-SCNC: 8.4 MG/DL (ref 8.6–10.5)
CHLORIDE SERPL-SCNC: 100 MMOL/L (ref 98–107)
CO2 SERPL-SCNC: 25.6 MMOL/L (ref 22–29)
CREAT BLD-MCNC: 0.8 MG/DL (ref 0.57–1)
CRP SERPL-MCNC: 2.55 MG/DL (ref 0–0.5)
DEPRECATED RDW RBC AUTO: 50.7 FL (ref 37–54)
EOSINOPHIL # BLD AUTO: 0 10*3/MM3 (ref 0–0.4)
EOSINOPHIL NFR BLD AUTO: 0 % (ref 0.3–6.2)
ERYTHROCYTE [DISTWIDTH] IN BLOOD BY AUTOMATED COUNT: 13.3 % (ref 12.3–15.4)
GFR SERPL CREATININE-BSD FRML MDRD: 76 ML/MIN/1.73
GLOBULIN UR ELPH-MCNC: 3.5 GM/DL
GLUCOSE BLD-MCNC: 102 MG/DL (ref 65–99)
GLUCOSE BLDC GLUCOMTR-MCNC: 118 MG/DL (ref 70–130)
GLUCOSE BLDC GLUCOMTR-MCNC: 135 MG/DL (ref 70–130)
GLUCOSE BLDC GLUCOMTR-MCNC: 91 MG/DL (ref 70–130)
GLUCOSE BLDC GLUCOMTR-MCNC: 97 MG/DL (ref 70–130)
HCT VFR BLD AUTO: 41.6 % (ref 34–46.6)
HGB BLD-MCNC: 13.2 G/DL (ref 12–15.9)
IMM GRANULOCYTES # BLD AUTO: 0.02 10*3/MM3 (ref 0–0.05)
IMM GRANULOCYTES NFR BLD AUTO: 0.4 % (ref 0–0.5)
LYMPHOCYTES # BLD AUTO: 1.27 10*3/MM3 (ref 0.7–3.1)
LYMPHOCYTES NFR BLD AUTO: 28.3 % (ref 19.6–45.3)
MCH RBC QN AUTO: 32.6 PG (ref 26.6–33)
MCHC RBC AUTO-ENTMCNC: 31.7 G/DL (ref 31.5–35.7)
MCV RBC AUTO: 102.7 FL (ref 79–97)
MONOCYTES # BLD AUTO: 0.59 10*3/MM3 (ref 0.1–0.9)
MONOCYTES NFR BLD AUTO: 13.2 % (ref 5–12)
NEUTROPHILS # BLD AUTO: 2.6 10*3/MM3 (ref 1.7–7)
NEUTROPHILS NFR BLD AUTO: 58.1 % (ref 42.7–76)
NRBC BLD AUTO-RTO: 0 /100 WBC (ref 0–0.2)
PHOSPHATE SERPL-MCNC: 4.1 MG/DL (ref 2.5–4.5)
PLATELET # BLD AUTO: 79 10*3/MM3 (ref 140–450)
PMV BLD AUTO: 12.6 FL (ref 6–12)
POTASSIUM BLD-SCNC: 4 MMOL/L (ref 3.5–5.2)
PROT SERPL-MCNC: 6.6 G/DL (ref 6–8.5)
RBC # BLD AUTO: 4.05 10*6/MM3 (ref 3.77–5.28)
SODIUM BLD-SCNC: 137 MMOL/L (ref 136–145)
WBC NRBC COR # BLD: 4.48 10*3/MM3 (ref 3.4–10.8)

## 2020-03-19 PROCEDURE — 80053 COMPREHEN METABOLIC PANEL: CPT | Performed by: INTERNAL MEDICINE

## 2020-03-19 PROCEDURE — 82962 GLUCOSE BLOOD TEST: CPT

## 2020-03-19 PROCEDURE — G0108 DIAB MANAGE TRN  PER INDIV: HCPCS

## 2020-03-19 PROCEDURE — 94799 UNLISTED PULMONARY SVC/PX: CPT

## 2020-03-19 PROCEDURE — 99232 SBSQ HOSP IP/OBS MODERATE 35: CPT | Performed by: INTERNAL MEDICINE

## 2020-03-19 PROCEDURE — 86140 C-REACTIVE PROTEIN: CPT | Performed by: INTERNAL MEDICINE

## 2020-03-19 PROCEDURE — 85025 COMPLETE CBC W/AUTO DIFF WBC: CPT | Performed by: INTERNAL MEDICINE

## 2020-03-19 PROCEDURE — 25010000002 METHYLPREDNISOLONE PER 40 MG: Performed by: INTERNAL MEDICINE

## 2020-03-19 PROCEDURE — 25010000002 CEFTRIAXONE: Performed by: INTERNAL MEDICINE

## 2020-03-19 PROCEDURE — 84100 ASSAY OF PHOSPHORUS: CPT | Performed by: INTERNAL MEDICINE

## 2020-03-19 RX ORDER — METHYLPREDNISOLONE SODIUM SUCCINATE 40 MG/ML
20 INJECTION, POWDER, LYOPHILIZED, FOR SOLUTION INTRAMUSCULAR; INTRAVENOUS EVERY 12 HOURS
Status: DISCONTINUED | OUTPATIENT
Start: 2020-03-19 | End: 2020-03-20

## 2020-03-19 RX ADMIN — IPRATROPIUM BROMIDE 0.5 MG: 0.5 SOLUTION RESPIRATORY (INHALATION) at 13:25

## 2020-03-19 RX ADMIN — ACETAMINOPHEN 650 MG: 325 TABLET ORAL at 23:09

## 2020-03-19 RX ADMIN — SODIUM CHLORIDE, PRESERVATIVE FREE 10 ML: 5 INJECTION INTRAVENOUS at 23:09

## 2020-03-19 RX ADMIN — DULOXETINE HYDROCHLORIDE 60 MG: 60 CAPSULE, DELAYED RELEASE ORAL at 10:14

## 2020-03-19 RX ADMIN — CLONAZEPAM 0.5 MG: 0.5 TABLET ORAL at 10:29

## 2020-03-19 RX ADMIN — CEFTRIAXONE 2 G: 2 INJECTION, POWDER, FOR SOLUTION INTRAMUSCULAR; INTRAVENOUS at 10:13

## 2020-03-19 RX ADMIN — OSELTAMIVIR PHOSPHATE 75 MG: 75 CAPSULE ORAL at 10:14

## 2020-03-19 RX ADMIN — IPRATROPIUM BROMIDE 0.5 MG: 0.5 SOLUTION RESPIRATORY (INHALATION) at 19:32

## 2020-03-19 RX ADMIN — BENZONATATE 200 MG: 100 CAPSULE ORAL at 10:29

## 2020-03-19 RX ADMIN — ACETAMINOPHEN 650 MG: 325 TABLET ORAL at 18:20

## 2020-03-19 RX ADMIN — SODIUM CHLORIDE, PRESERVATIVE FREE 10 ML: 5 INJECTION INTRAVENOUS at 10:15

## 2020-03-19 RX ADMIN — ALBUTEROL SULFATE 2.5 MG: 2.5 SOLUTION RESPIRATORY (INHALATION) at 07:14

## 2020-03-19 RX ADMIN — DULOXETINE HYDROCHLORIDE 60 MG: 60 CAPSULE, DELAYED RELEASE ORAL at 20:49

## 2020-03-19 RX ADMIN — Medication 1 CAPSULE: at 10:14

## 2020-03-19 RX ADMIN — PREGABALIN 150 MG: 75 CAPSULE ORAL at 10:29

## 2020-03-19 RX ADMIN — PANTOPRAZOLE SODIUM 40 MG: 40 TABLET, DELAYED RELEASE ORAL at 20:49

## 2020-03-19 RX ADMIN — METHYLPREDNISOLONE SODIUM SUCCINATE 20 MG: 40 INJECTION, POWDER, FOR SOLUTION INTRAMUSCULAR; INTRAVENOUS at 18:06

## 2020-03-19 RX ADMIN — BUDESONIDE AND FORMOTEROL FUMARATE DIHYDRATE 2 PUFF: 160; 4.5 AEROSOL RESPIRATORY (INHALATION) at 07:12

## 2020-03-19 RX ADMIN — OSELTAMIVIR PHOSPHATE 75 MG: 75 CAPSULE ORAL at 20:49

## 2020-03-19 RX ADMIN — METOPROLOL SUCCINATE 50 MG: 50 TABLET, EXTENDED RELEASE ORAL at 10:14

## 2020-03-19 RX ADMIN — PREGABALIN 150 MG: 75 CAPSULE ORAL at 20:49

## 2020-03-19 RX ADMIN — ESTROGENS, CONJUGATED 0.6 MG: 0.3 TABLET, FILM COATED ORAL at 10:14

## 2020-03-19 RX ADMIN — DOXYCYCLINE 100 MG: 100 INJECTION, POWDER, LYOPHILIZED, FOR SOLUTION INTRAVENOUS at 11:37

## 2020-03-19 RX ADMIN — IPRATROPIUM BROMIDE 0.5 MG: 0.5 SOLUTION RESPIRATORY (INHALATION) at 00:06

## 2020-03-19 RX ADMIN — QUETIAPINE FUMARATE 200 MG: 100 TABLET ORAL at 23:09

## 2020-03-19 RX ADMIN — CLONAZEPAM 0.5 MG: 0.5 TABLET ORAL at 23:09

## 2020-03-19 RX ADMIN — PANTOPRAZOLE SODIUM 40 MG: 40 TABLET, DELAYED RELEASE ORAL at 10:14

## 2020-03-19 RX ADMIN — RANOLAZINE 500 MG: 500 TABLET, FILM COATED, EXTENDED RELEASE ORAL at 20:49

## 2020-03-19 RX ADMIN — ACETAMINOPHEN 650 MG: 325 TABLET ORAL at 10:29

## 2020-03-19 RX ADMIN — Medication 1 TABLET: at 20:48

## 2020-03-19 RX ADMIN — BUDESONIDE AND FORMOTEROL FUMARATE DIHYDRATE 2 PUFF: 160; 4.5 AEROSOL RESPIRATORY (INHALATION) at 19:33

## 2020-03-19 RX ADMIN — RANOLAZINE 500 MG: 500 TABLET, FILM COATED, EXTENDED RELEASE ORAL at 10:14

## 2020-03-19 RX ADMIN — BENZONATATE 200 MG: 100 CAPSULE ORAL at 18:20

## 2020-03-19 NOTE — PLAN OF CARE
Patient is resting quietly. No complaints. No distress noted. VS stable. Will continue to monitor and follow plan of care.

## 2020-03-19 NOTE — PROGRESS NOTES
LOS: 2 days     Chief Complaint:  Pulmonology is following for shortness of breath    Subjective     Interval History: Ms. Campos is resting in bed.  She states that her breathing feels a little more tight today.  She states that she is coughing but is having difficulty coughing up secretions.  She is currently on 2 L/min of supplemental oxygen via nasal cannula saturating 96%.    History taken from: patient chart RN    Review of Systems - History obtained from chart review and the patient  General ROS: negative for - chills, fatigue or fever  Psychological ROS: negative for - anxiety or depression  ENT ROS: negative for - headaches or hearing change  Allergy and Immunology ROS: negative for - nasal congestion, postnasal drip or seasonal allergies  Endocrine ROS: negative for - polydipsia/polyuria  Respiratory ROS: Positive cough and shortness of breath  Cardiovascular ROS: positive for - dyspnea on exertion  Gastrointestinal ROS: no abdominal pain, change in bowel habits, or black or bloody stools  Musculoskeletal ROS: negative for - joint pain, joint stiffness or joint swelling  Neurological ROS: no TIA or stroke symptoms      Objective     Vital Signs  Temp:  [97.5 °F (36.4 °C)-98.9 °F (37.2 °C)] 98 °F (36.7 °C)  Heart Rate:  [74-92] 80  Resp:  [18-20] 18  BP: (101-150)/(56-74) 130/64  Body mass index is 54.81 kg/m².    Intake/Output Summary (Last 24 hours) at 3/19/2020 1019  Last data filed at 3/19/2020 0845  Gross per 24 hour   Intake 1005 ml   Output 1500 ml   Net -495 ml     I/O this shift:  In: 240 [P.O.:240]  Out: -     Physical Exam:  GENERAL APPEARANCE: Well developed, well nourished, alert and cooperative, and appears to be in no acute distress.    HEAD: normocephalic. atraumatic.    EYES: PERRL, EOMI. Vision is grossly intact.    THROAT: Oral cavity and pharynx normal. No inflammation, swelling, exudate, or lesions.     NECK: Neck supple. No thyromegaly.    CARDIAC: Normal S1 and S2. No S3, S4 or  murmurs. Rhythm is regular.     RESPIRATORY: Bilateral air entry positive. Diminished breath sounds at the lung bases.  Bilateral wheezing noted     GI: Positive bowel sounds. Soft, nondistended, nontender.     MUSCULOSKELETAL: No significant deformity or joint abnormality. No edema. Peripheral pulses intact. No varicosities.    NEUROLOGICAL: Strength and sensation symmetric and intact throughout.     PSYCHIATRIC: The mental examination revealed the patient was oriented to person, place, and time.                 Results Review:                I reviewed the patient's new clinical results.  I reviewed the patient's new imaging results and agree with the interpretation.  Results from last 7 days   Lab Units 03/19/20  0516 03/18/20  0407 03/17/20  0923   WBC 10*3/mm3 4.48 3.59 4.83   HEMOGLOBIN g/dL 13.2 13.2 13.8   PLATELETS 10*3/mm3 79* 60* 79*     Results from last 7 days   Lab Units 03/19/20  0516 03/18/20  0407 03/17/20  0923   SODIUM mmol/L 137 137 138   POTASSIUM mmol/L 4.0 4.0 4.1   CHLORIDE mmol/L 100 100 100   CO2 mmol/L 25.6 22.5 24.9   BUN mg/dL 14 10 8   CREATININE mg/dL 0.80 0.70 0.77   CALCIUM mg/dL 8.4* 8.2* 8.5*   GLUCOSE mg/dL 102* 142* 167*     Lab Results   Component Value Date    INR 1.15 (H) 03/18/2020    PROTIME 15.3 03/18/2020     Results from last 7 days   Lab Units 03/19/20  0516 03/18/20  0407 03/17/20  0923   ALK PHOS U/L 78 82 90   BILIRUBIN mg/dL 0.4 0.5 0.7   ALT (SGPT) U/L 38* 38* 28   AST (SGOT) U/L 101* 112* 63*     Results from last 7 days   Lab Units 03/18/20  1357   PH, ARTERIAL pH units 7.392   PO2 ART mm Hg 79.8*   PCO2, ARTERIAL mm Hg 44.1   HCO3 ART mmol/L 26.8*     Imaging Results (Last 24 Hours)     ** No results found for the last 24 hours. **             Medication Review:   Scheduled Medications:    budesonide-formoterol 2 puff Inhalation BID - RT   cefTRIAXone 2 g Intravenous Q24H   doxycycline 100 mg Intravenous Q12H   DULoxetine 60 mg Oral BID   estrogens (conjugated)  0.6 mg Oral Daily   ipratropium 0.5 mg Nebulization 4x Daily - RT   lactobacillus acidophilus 1 capsule Oral Daily   methylPREDNISolone sodium succinate 40 mg Intravenous Q12H   metoprolol succinate XL 50 mg Oral Daily   multivitamin 1 tablet Oral Nightly   oseltamivir 75 mg Oral Q12H   pantoprazole 40 mg Oral BID   pregabalin 150 mg Oral Q12H   QUEtiapine 200 mg Oral Nightly   ranolazine 500 mg Oral Q12H   sodium chloride 10 mL Intravenous Q12H   sodium chloride 10 mL Intravenous Q12H     Continuous infusions:    insulin regular    Pharmacy Consult - Pharmacy to dose    Pharmacy Consult - Pharmacy to dose        SOFY Rangel  03/19/20  10:19          I have seen and examined the patient personally and performed a face-to-face diagnostic evaluation. The plan of care was reviewed and developed with APRN and nursing staff. I have addended and/or modified the above history of present illness, physical examination, and assessment/plan to reflect my findings and impressions. Essential elements of the care plan were discussed with APRN above.  I agree with the findings and assessment/plan as documented below.        I have reviewed the labs.  Showed pH of 7.39, PCO2 44 and PO2 of 79.8.  Serum creatinine normal.  C-reactive protein trending down.  Leukocytosis.  ANC normal.       Assessment and plan:  · Hypoxia requiring supplemental oxygen  · Bilateral pneumonia, right lung greater than left due to influenza  · Acute exacerbation of COPD    Titrate FiO2 to keep SPO2 around 90%.  On IV ceftriaxone and IV doxycycline  On p.o. Tamiflu  On Symbicort nebs, duo nebs IV steroids  In case of worsening of clinical condition, I recommend escalating the antibiotics to include coverage for MRSA and Pseudomonas.  In that situation due to thrombocytopenia, I recommend infectious disease team opinion over antibiotic choice.                         VTE prophylaxis  Mechanical Order History:      Ordered        03/17/20  1559  Place Sequential Compression Device  Once         03/17/20 1559  Maintain Sequential Compression Device  Continuous                 Pharmalogical Order History:     Ordered     Dose Route Frequency Stop    03/17/20 1540  heparin (porcine) 5000 UNIT/ML injection 5,000 Units  Status:  Discontinued      5,000 Units SC Every 12 Hours Scheduled 03/17/20 1554            Thank you for involving us in the care of the patient.  Andry Luciano M.D  Pulmonary and Critical Care Medicine

## 2020-03-19 NOTE — CONSULTS
Performed diabetes educator consult, patient reports she has all supplies needed to manage diabetes at home.  Discussed importance of reducing risks.  See diabetes history and assessment topics.    Pt was in yesterday and saw Dr Sheldon Reynaga. Dr Sheldon Reynaga advised and pt is to have stool studies done.  taras

## 2020-03-20 VITALS
WEIGHT: 293 LBS | HEIGHT: 63 IN | HEART RATE: 73 BPM | SYSTOLIC BLOOD PRESSURE: 118 MMHG | OXYGEN SATURATION: 92 % | RESPIRATION RATE: 24 BRPM | TEMPERATURE: 97.6 F | BODY MASS INDEX: 51.91 KG/M2 | DIASTOLIC BLOOD PRESSURE: 78 MMHG

## 2020-03-20 LAB
ALBUMIN SERPL-MCNC: 2.65 G/DL (ref 3.5–5.2)
ALBUMIN/GLOB SERPL: 0.7 G/DL
ALP SERPL-CCNC: 76 U/L (ref 39–117)
ALT SERPL W P-5'-P-CCNC: 35 U/L (ref 1–33)
ANION GAP SERPL CALCULATED.3IONS-SCNC: 10.8 MMOL/L (ref 5–15)
AST SERPL-CCNC: 79 U/L (ref 1–32)
BASOPHILS # BLD AUTO: 0.01 10*3/MM3 (ref 0–0.2)
BASOPHILS NFR BLD AUTO: 0.2 % (ref 0–1.5)
BILIRUB SERPL-MCNC: 0.3 MG/DL (ref 0.2–1.2)
BUN BLD-MCNC: 17 MG/DL (ref 6–20)
BUN/CREAT SERPL: 23.3 (ref 7–25)
CALCIUM SPEC-SCNC: 8.6 MG/DL (ref 8.6–10.5)
CHLORIDE SERPL-SCNC: 106 MMOL/L (ref 98–107)
CO2 SERPL-SCNC: 24.2 MMOL/L (ref 22–29)
CREAT BLD-MCNC: 0.73 MG/DL (ref 0.57–1)
CRP SERPL-MCNC: 1.05 MG/DL (ref 0–0.5)
DEPRECATED RDW RBC AUTO: 50.7 FL (ref 37–54)
EOSINOPHIL # BLD AUTO: 0 10*3/MM3 (ref 0–0.4)
EOSINOPHIL NFR BLD AUTO: 0 % (ref 0.3–6.2)
ERYTHROCYTE [DISTWIDTH] IN BLOOD BY AUTOMATED COUNT: 13.4 % (ref 12.3–15.4)
GFR SERPL CREATININE-BSD FRML MDRD: 85 ML/MIN/1.73
GLOBULIN UR ELPH-MCNC: 3.9 GM/DL
GLUCOSE BLD-MCNC: 158 MG/DL (ref 65–99)
GLUCOSE BLDC GLUCOMTR-MCNC: 191 MG/DL (ref 70–130)
GLUCOSE BLDC GLUCOMTR-MCNC: 85 MG/DL (ref 70–130)
GLUCOSE BLDC GLUCOMTR-MCNC: 94 MG/DL (ref 70–130)
HCT VFR BLD AUTO: 41.3 % (ref 34–46.6)
HGB BLD-MCNC: 13.2 G/DL (ref 12–15.9)
IMM GRANULOCYTES # BLD AUTO: 0 10*3/MM3 (ref 0–0.05)
IMM GRANULOCYTES NFR BLD AUTO: 0 % (ref 0–0.5)
LYMPHOCYTES # BLD AUTO: 1.7 10*3/MM3 (ref 0.7–3.1)
LYMPHOCYTES NFR BLD AUTO: 34.3 % (ref 19.6–45.3)
MCH RBC QN AUTO: 32.5 PG (ref 26.6–33)
MCHC RBC AUTO-ENTMCNC: 32 G/DL (ref 31.5–35.7)
MCV RBC AUTO: 101.7 FL (ref 79–97)
MONOCYTES # BLD AUTO: 0.46 10*3/MM3 (ref 0.1–0.9)
MONOCYTES NFR BLD AUTO: 9.3 % (ref 5–12)
NEUTROPHILS # BLD AUTO: 2.79 10*3/MM3 (ref 1.7–7)
NEUTROPHILS NFR BLD AUTO: 56.2 % (ref 42.7–76)
NRBC BLD AUTO-RTO: 0 /100 WBC (ref 0–0.2)
PLATELET # BLD AUTO: 85 10*3/MM3 (ref 140–450)
PMV BLD AUTO: 12.1 FL (ref 6–12)
POTASSIUM BLD-SCNC: 4 MMOL/L (ref 3.5–5.2)
PROT SERPL-MCNC: 6.5 G/DL (ref 6–8.5)
RBC # BLD AUTO: 4.06 10*6/MM3 (ref 3.77–5.28)
SODIUM BLD-SCNC: 141 MMOL/L (ref 136–145)
WBC NRBC COR # BLD: 4.96 10*3/MM3 (ref 3.4–10.8)

## 2020-03-20 PROCEDURE — 25010000002 METHYLPREDNISOLONE PER 40 MG: Performed by: INTERNAL MEDICINE

## 2020-03-20 PROCEDURE — 99232 SBSQ HOSP IP/OBS MODERATE 35: CPT | Performed by: INTERNAL MEDICINE

## 2020-03-20 PROCEDURE — 86140 C-REACTIVE PROTEIN: CPT | Performed by: INTERNAL MEDICINE

## 2020-03-20 PROCEDURE — 94799 UNLISTED PULMONARY SVC/PX: CPT

## 2020-03-20 PROCEDURE — 25010000002 CEFTRIAXONE PER 250 MG: Performed by: INTERNAL MEDICINE

## 2020-03-20 PROCEDURE — 80053 COMPREHEN METABOLIC PANEL: CPT | Performed by: INTERNAL MEDICINE

## 2020-03-20 PROCEDURE — 82962 GLUCOSE BLOOD TEST: CPT

## 2020-03-20 PROCEDURE — 85025 COMPLETE CBC W/AUTO DIFF WBC: CPT | Performed by: INTERNAL MEDICINE

## 2020-03-20 PROCEDURE — 99239 HOSP IP/OBS DSCHRG MGMT >30: CPT | Performed by: INTERNAL MEDICINE

## 2020-03-20 RX ORDER — BUDESONIDE AND FORMOTEROL FUMARATE DIHYDRATE 160; 4.5 UG/1; UG/1
2 AEROSOL RESPIRATORY (INHALATION)
Qty: 1 INHALER | Refills: 0 | Status: SHIPPED | OUTPATIENT
Start: 2020-03-20 | End: 2020-05-28

## 2020-03-20 RX ORDER — ALBUTEROL SULFATE 90 UG/1
2 AEROSOL, METERED RESPIRATORY (INHALATION) EVERY 4 HOURS PRN
Qty: 1 INHALER | Refills: 1 | Status: SHIPPED | OUTPATIENT
Start: 2020-03-20 | End: 2020-11-13 | Stop reason: SDUPTHER

## 2020-03-20 RX ORDER — CEFDINIR 300 MG/1
300 CAPSULE ORAL 2 TIMES DAILY
Qty: 14 CAPSULE | Refills: 0 | Status: SHIPPED | OUTPATIENT
Start: 2020-03-20 | End: 2020-03-27

## 2020-03-20 RX ORDER — PREDNISONE 10 MG/1
TABLET ORAL
Qty: 10 TABLET | Refills: 0 | Status: SHIPPED | OUTPATIENT
Start: 2020-03-20 | End: 2020-05-01

## 2020-03-20 RX ORDER — OSELTAMIVIR PHOSPHATE 75 MG/1
75 CAPSULE ORAL EVERY 12 HOURS SCHEDULED
Qty: 4 CAPSULE | Refills: 0 | Status: SHIPPED | OUTPATIENT
Start: 2020-03-20 | End: 2020-03-22

## 2020-03-20 RX ORDER — PREDNISONE 10 MG/1
TABLET ORAL
Qty: 10 TABLET | Refills: 0 | Status: SHIPPED | OUTPATIENT
Start: 2020-03-20 | End: 2020-03-20 | Stop reason: SDUPTHER

## 2020-03-20 RX ORDER — PREDNISONE 20 MG/1
40 TABLET ORAL
Status: DISCONTINUED | OUTPATIENT
Start: 2020-03-21 | End: 2020-03-20 | Stop reason: HOSPADM

## 2020-03-20 RX ADMIN — ACETAMINOPHEN 650 MG: 325 TABLET ORAL at 09:41

## 2020-03-20 RX ADMIN — IPRATROPIUM BROMIDE 0.5 MG: 0.5 SOLUTION RESPIRATORY (INHALATION) at 07:26

## 2020-03-20 RX ADMIN — CEFTRIAXONE 2 G: 2 INJECTION, POWDER, FOR SOLUTION INTRAMUSCULAR; INTRAVENOUS at 09:27

## 2020-03-20 RX ADMIN — OSELTAMIVIR PHOSPHATE 75 MG: 75 CAPSULE ORAL at 09:27

## 2020-03-20 RX ADMIN — Medication 1 CAPSULE: at 09:27

## 2020-03-20 RX ADMIN — IPRATROPIUM BROMIDE 0.5 MG: 0.5 SOLUTION RESPIRATORY (INHALATION) at 02:29

## 2020-03-20 RX ADMIN — METOPROLOL SUCCINATE 50 MG: 50 TABLET, EXTENDED RELEASE ORAL at 09:24

## 2020-03-20 RX ADMIN — RANOLAZINE 500 MG: 500 TABLET, FILM COATED, EXTENDED RELEASE ORAL at 09:27

## 2020-03-20 RX ADMIN — PREGABALIN 150 MG: 75 CAPSULE ORAL at 09:23

## 2020-03-20 RX ADMIN — PANTOPRAZOLE SODIUM 40 MG: 40 TABLET, DELAYED RELEASE ORAL at 09:23

## 2020-03-20 RX ADMIN — BENZONATATE 200 MG: 100 CAPSULE ORAL at 09:42

## 2020-03-20 RX ADMIN — BUDESONIDE AND FORMOTEROL FUMARATE DIHYDRATE 2 PUFF: 160; 4.5 AEROSOL RESPIRATORY (INHALATION) at 07:26

## 2020-03-20 RX ADMIN — IPRATROPIUM BROMIDE 0.5 MG: 0.5 SOLUTION RESPIRATORY (INHALATION) at 13:25

## 2020-03-20 RX ADMIN — DULOXETINE HYDROCHLORIDE 60 MG: 60 CAPSULE, DELAYED RELEASE ORAL at 09:27

## 2020-03-20 RX ADMIN — METHYLPREDNISOLONE SODIUM SUCCINATE 20 MG: 40 INJECTION, POWDER, FOR SOLUTION INTRAMUSCULAR; INTRAVENOUS at 05:05

## 2020-03-20 RX ADMIN — CLONAZEPAM 0.5 MG: 0.5 TABLET ORAL at 09:42

## 2020-03-20 NOTE — NURSING NOTE
[facility] wouldn't accept home o2 order. [facility] faxed paper work to [phone] brought o2 to hospital.

## 2020-03-20 NOTE — PLAN OF CARE
Patient resting quietly this morning with eyes closed she is lying on her right side and snoring very loudly.  Patient has a fan blowing in toward her face to assist with feeling secure about getting her breath.  Patient ambulated in room with out oxygen and pulse ox showing oxygen level at 83. Patient displays shortness of air with minimum exertion.  Takes the patient about 3 to 5 minutes to recovery.  Patient states she is ready to go home.  Mother will be picking her up this afternoon. Reviewed discharge instructions and patient states she I has no questions at this time. Continue to follow plan of care

## 2020-03-20 NOTE — PROGRESS NOTES
Continued Stay Note  [facility]I [facility]     Patient Name: Tiki Campos  MRN: [id number]  Today's Date: 3/20/2020    Admit Date: 3/17/2020    Discharge Plan     Row Name 03/20/20 1025       Plan    Plan Pt still plans to return home alone at discharge. She says her PCP, Dr Bartolome Recinos, has called her and arranged a f/u phone call for Monday, 3/23/20 @ 1:30 pm to f/u on her progress at home after discharge.  She has CPAP, RW, WC, glucometer and insulin pump/supplies at home.  Pt is currently using O2 @ 2 lpm PRN and if she qualifies for O2 @ d/c this will be arranged with MD order at discharge.  She does not have HH services.  Her family will provide transportation home at discharge.    Patient/Family in Agreement with Plan  yes    Plan Comments CM spoke with pt for f/u visit and she says she is anxious to go home.  She is on IV abx, Solu Medrol and Tamiflu.  She anticipates being d/c home today and denies any needs other than possibly needing O2.  CM will follow.     Danitza Pascual RN

## 2020-03-20 NOTE — DISCHARGE PLACEMENT REQUEST
"Tiki Campos (50 y.o. Female)     Date of Birth Social Security Number Address Home Phone MRN    1970  [id number]   [place] [address] [phone] [id number]    Church Marital Status          Spiritism        Admission Date Admission Type Admitting Provider Attending Provider Department, Room/Bed    3/17/20 Emergency Marcelino Dunaway MD Heinss, Karl F, MD [address], [address]/    Discharge Date Discharge Disposition Discharge Destination         Home or Self Care              Attending Provider:  Marcelino Dunaway MD    Allergies:  Morphine    Isolation:  Droplet   Infection:  Influenza (03/17/20)   Code Status:  CPR    Ht:  160 cm (63\")   Wt:  140 kg (309 lb)    Admission Cmt:  None   Principal Problem:  None                Active Insurance as of 3/17/2020     Primary Coverage     Payor Plan Insurance Group Employer/Plan Group    [employer]HEM BLUE CROSS AN[employer]M [employer]UE [employer]OSS BLUE [employer]IELD PPO [id number]     Payor Plan Address Payor Plan Phone Number Payor Plan Fax Number Effective Dates    [address] [phone]  1/1/2019 - None Entered    [place] [address]       Subscriber Name Subscriber Birth Date Member ID       MANE CAMPOS 1970 [id number]                 Emergency Contacts      (Rel.) Home Phone Work Phone Mobile Phone    LORE KEARNS () [phone] -- [phone]            Emergency Contact Information     Name Relation Home Work Mobile    LORE KEARNS [phone]  [phone]          Insurance Information                [employer]HEM BLUE CROSS/[employer]HE[employer]UE CROSS BLUE SHIELD PPO Phone: [phone]    Subscriber: Mane Campos Subscriber#: [id number]    Group#: [id number] Precert#:             Oxygen Therapy (last 2 days)     Date/Time   SpO2   Device (Oxygen Therapy)   Flow (L/min)   Oxygen Concentration (%)   ETCO2 (mmHg)    03/20/20 1506   92   --   --   --   --    03/20/20 1325   97   nasal cannula   2   --   --    " 03/20/20 1026   97   --   --   --   --    03/20/20 0726   96   nasal cannula   2   --   --    03/20/20 0645   94   --   --   --   --    03/20/20 0342   96   nasal cannula   2   --   --    03/20/20 0229   92   room air   --   --   --    03/19/20 1932   97   room air   --   --   --    03/19/20 1836   93   room air   --   --   --    03/19/20 1536   95   --   --   --   --    03/19/20 1325   98   nasal cannula   2   --   --    03/19/20 1049   98   --   --   --   --    03/19/20 0750   --   nasal cannula   2   --   --    03/19/20 0714   94   nasal cannula   2   --   --    03/19/20 0700   98   --   --   --   --    03/19/20 0300   97   room air   --   --   --    03/19/20 0016   96   nasal cannula   2   --   --    03/19/20 0006   95   nasal cannula   2   --   --    03/18/20 2213   95   nasal cannula   2   --   --    03/18/20 1945   --   nasal cannula   2   --   --    03/18/20 1853   98   nasal cannula   2   --   --    03/18/20 1843   98   nasal cannula   2   --   --    03/18/20 1730   95   --   --   --   --    03/18/20 1402   98   --   --   --   --    03/18/20 1315   95   nasal cannula   2   --   --    03/18/20 1102   97   --   --   --   --    03/18/20 0800   --   nasal cannula   2   --   --    03/18/20 0627   96   nasal cannula   2   --   --    03/18/20 0300   97   nasal cannula   --   --   --    03/18/20 0205   97   --   --   --   --    03/18/20 0100   95   --   --   --   --    03/18/20 0008   95   nasal cannula   2   --   --    03/18/20 0005   95   --   --   --   --              Lab Results (last 24 hours)     Procedure Component Value Units Date/Time    POC Glucose Once [[id number]]  (Abnormal) Collected:  03/20/20 1024    Specimen:  Blood Updated:  03/20/20 1035     Glucose 191 mg/dL     Blood Culture - Blood, Arm, Right [[id number]] Collected:  03/17/20 0949    Specimen:  Blood from Arm, Right Updated:  03/20/20 1000     Blood Culture No growth at 3 days    Blood Culture - Blood, Arm, Left [[id number]] Collected:  03/17/20  0923    Specimen:  Blood from Arm, Left Updated:  03/20/20 0930     Blood Culture No growth at 3 days    POC Glucose Once [[id number]]  (Normal) Collected:  03/20/20 0644    Specimen:  Blood Updated:  03/20/20 0700     Glucose 94 mg/dL     Comprehensive Metabolic Panel [[id number]]  (Abnormal) Collected:  03/20/20 0425    Specimen:  Blood Updated:  03/20/20 0531     Glucose 158 mg/dL      BUN 17 mg/dL      Creatinine 0.73 mg/dL      Sodium 141 mmol/L      Potassium 4.0 mmol/L      Chloride 106 mmol/L      CO2 24.2 mmol/L      Calcium 8.6 mg/dL      Total Protein 6.5 g/dL      Albumin 2.65 g/dL      ALT (SGPT) 35 U/L      AST (SGOT) 79 U/L      Alkaline Phosphatase 76 U/L      Total Bilirubin 0.3 mg/dL      eGFR Non African Amer 85 mL/min/1.73      Globulin 3.9 gm/dL      A/G Ratio 0.7 g/dL      BUN/Creatinine Ratio 23.3     Anion Gap 10.8 mmol/L     Narrative:       GFR Normal >60  Chronic Kidney Disease <60  Kidney Failure <15      C-reactive Protein [[id number]]  (Abnormal) Collected:  03/20/20 0425    Specimen:  Blood Updated:  03/20/20 0531     C-Reactive Protein 1.05 mg/dL     CBC & Differential [[id number]] Collected:  03/20/20 0425    Specimen:  Blood Updated:  03/20/20 0512    Narrative:       The following orders were created for panel order CBC & Differential.  Procedure                               Abnormality         Status                     ---------                               -----------         ------                     CBC Auto Differential[[id number]]        Abnormal            Final result                 Please view results for these tests on the individual orders.    CBC Auto Differential [[id number]]  (Abnormal) Collected:  03/20/20 0425    Specimen:  Blood Updated:  03/20/20 0512     WBC 4.96 10*3/mm3      RBC 4.06 10*6/mm3      Hemoglobin 13.2 g/dL      Hematocrit 41.3 %      .7 fL      MCH 32.5 pg      MCHC 32.0 g/dL      RDW 13.4 %      RDW-SD 50.7 fl      MPV 12.1 fL      Platelets  85 10*3/mm3      Neutrophil % 56.2 %      Lymphocyte % 34.3 %      Monocyte % 9.3 %      Eosinophil % 0.0 %      Basophil % 0.2 %      Immature Grans % 0.0 %      Neutrophils, Absolute 2.79 10*3/mm3      Lymphocytes, Absolute 1.70 10*3/mm3      Monocytes, Absolute 0.46 10*3/mm3      Eosinophils, Absolute 0.00 10*3/mm3      Basophils, Absolute 0.01 10*3/mm3      Immature Grans, Absolute 0.00 10*3/mm3      nRBC 0.0 /100 WBC     POC Glucose Once [[id number]]  (Normal) Collected:  03/19/20 1920    Specimen:  Blood Updated:  03/19/20 1927     Glucose 91 mg/dL     POC Glucose Once [[id number]]  (Normal) Collected:  03/19/20 1538    Specimen:  Blood Updated:  03/19/20 1600     Glucose 118 mg/dL         Referral Orders (last 24 hours) (24h ago, onward)    None        Consult Orders (last 24 hours) (24h ago, onward)    None             Physician Progress Notes (last 24 hours) (Notes from 03/19/20 1518 through 03/20/20 1518)      Adelaida Pugh, SOFY at 03/20/20 1045           LOS: 3 days     Chief Complaint:  Pulmonology is following for shortness of breath    Subjective     Interval History: Ms. Campos is resting in bed. She states that she is feeling about the same today.  She reports continued coughing.  She is currently on 2 liters/minute of supplemental oxygen via nasal cannula.      History taken from: patient chart RN    Review of Systems - History obtained from chart review and the patient  General ROS: negative for - chills, fatigue or fever  Psychological ROS: negative for - anxiety or depression  ENT ROS: negative for - headaches or hearing change  Allergy and Immunology ROS: negative for - nasal congestion, postnasal drip or seasonal allergies  Endocrine ROS: negative for - polydipsia/polyuria  Respiratory ROS: positive for - cough and shortness of breath  Cardiovascular ROS: positive for - dyspnea on exertion  negative for - chest pain  Gastrointestinal ROS: negative for abdominal pain or  nausesa  Musculoskeletal ROS: negative for - joint pain, joint stiffness or joint swelling  Neurological ROS: no TIA or stroke symptoms      Objective     Vital Signs  Temp:  [96.2 °F (35.7 °C)-98 °F (36.7 °C)] 97.2 °F (36.2 °C)  Heart Rate:  [63-87] 73  Resp:  [16-24] 24  BP: (110-128)/(59-82) 117/59  Body mass index is 54.74 kg/m².    Intake/Output Summary (Last 24 hours) at 3/20/2020 1045  Last data filed at 3/20/2020 1026  Gross per 24 hour   Intake 960 ml   Output 3050 ml   Net -2090 ml     I/O this shift:  In: 240 [P.O.:240]  Out: 600 [Urine:600]    Physical Exam:  GENERAL APPEARANCE: Well developed, well nourished, alert and cooperative, and appears to be in no acute distress.    HEAD: normocephalic. atraumatic.    EYES: PERRL, EOMI. Vision is grossly intact.    THROAT: Oral cavity and pharynx normal. No inflammation, swelling, exudate, or lesions.     NECK: Neck supple. No thyromegaly.    CARDIAC: Normal S1 and S2. No S3, S4 or murmurs. Rhythm is regular.     RESPIRATORY: Bilateral air entry positive. Diminished breath sounds at the lung bases.   improvement noted in wheezing.     GI: Positive bowel sounds. Soft, nondistended, nontender.     MUSCULOSKELETAL: No significant deformity or joint abnormality. No edema. Peripheral pulses intact. No varicosities.    NEUROLOGICAL: Strength and sensation symmetric and intact throughout.     PSYCHIATRIC: The mental examination revealed the patient was oriented to person, place, and time.                 Results Review:                I reviewed the patient's new clinical results.  I reviewed the patient's new imaging results and agree with the interpretation.  Results from last 7 days   Lab Units 03/20/20  0425 03/19/20  0516 03/18/20  0407   WBC 10*3/mm3 4.96 4.48 3.59   HEMOGLOBIN g/dL 13.2 13.2 13.2   PLATELETS 10*3/mm3 85* 79* 60*     Results from last 7 days   Lab Units 03/20/20  0425 03/19/20  0516 03/18/20  0407   SODIUM mmol/L 141 137 137   POTASSIUM mmol/L  4.0 4.0 4.0   CHLORIDE mmol/L 106 100 100   CO2 mmol/L 24.2 25.6 22.5   BUN mg/dL 17 14 10   CREATININE mg/dL 0.73 0.80 0.70   CALCIUM mg/dL 8.6 8.4* 8.2*   GLUCOSE mg/dL 158* 102* 142*     Lab Results   Component Value Date    INR 1.15 (H) 03/18/2020    PROTIME 15.3 03/18/2020     Results from last 7 days   Lab Units 03/20/20  0425 03/19/20  0516 03/18/20  0407   ALK PHOS U/L 76 78 82   BILIRUBIN mg/dL 0.3 0.4 0.5   ALT (SGPT) U/L 35* 38* 38*   AST (SGOT) U/L 79* 101* 112*     Results from last 7 days   Lab Units 03/18/20  1357   PH, ARTERIAL pH units 7.392   PO2 ART mm Hg 79.8*   PCO2, ARTERIAL mm Hg 44.1   HCO3 ART mmol/L 26.8*     Imaging Results (Last 24 Hours)     ** No results found for the last 24 hours. **             Medication Review:   Scheduled Medications:    budesonide-formoterol 2 puff Inhalation BID - RT   cefTRIAXone 2 g Intravenous Q24H   DULoxetine 60 mg Oral BID   estrogens (conjugated) 0.6 mg Oral Daily   ipratropium 0.5 mg Nebulization 4x Daily - RT   lactobacillus acidophilus 1 capsule Oral Daily   methylPREDNISolone sodium succinate 20 mg Intravenous Q12H   metoprolol succinate XL 50 mg Oral Daily   multivitamin 1 tablet Oral Nightly   oseltamivir 75 mg Oral Q12H   pantoprazole 40 mg Oral BID   pregabalin 150 mg Oral Q12H   QUEtiapine 200 mg Oral Nightly   ranolazine 500 mg Oral Q12H   sodium chloride 10 mL Intravenous Q12H   sodium chloride 10 mL Intravenous Q12H     Continuous infusions:    insulin regular        SOFY Rangel  03/20/20  10:45              Electronically signed by Adelaida Pugh APRN at 03/20/20 1105          Consult Notes (most recent note)      Andry Luciano MD at 03/18/20 1422      Consult Orders    1. Inpatient Pulmonology Consult [[id number]] ordered by Marcelino Dunaway MD at 03/17/20 1215                  Referring Provider: Dr. Heinss  Reason for Consultation: atypical pneumonia, acute hypoxic respiratory failure      Chief complaint  shortness of breath      History of present illness:      Mrs. Campos is a 50 year old female with past medical history of anxiety, depression, diabetes, hyperlipidemia, hypertension.  She initially presented to the ED yesterday due to shortness of breath, cough.  Symptoms had acutely started on the day prior to presentation.  She also admitted to headache, body aches, fever recorded at 101.  Symptoms were not improved with use of Tylenol.  Dyspnea is worsened by exertion, improved with rest.  She denies any recent travel or recently ill contacts, but had recently visited the hospital for x-rays on March 12.  ED evaluation revealed temperature of 100.3, tachycardia, tachypnea, initial saturation of 88% on room air.  Testing was ultimately significant for hypoxia on ABG with compensated respiratory acidosis and metabolic alkalosis.  CRP was elevated at 2.4 with a lactate noted at 2.4.  Procalcitonin was nonelevated.  No leukocytosis noted on CBC.  Urinalysis was only significant for trace ketones, 3+ blood, trace leukocytes, trace protein, small bilirubin, urobilinogen.  Blood cultures were obtained.  Respiratory PCR was significant for influenza A H1 2009.   CT chest with contrast showed right greater than left centrilobular nodularity consistent with bilateral atypical pneumonia, reactive borderline adenopathy of the right hilum and mediastinum, mild cardiomegaly, liver cirrhosis.  CT of the head without contrast was negative.  CT abdomen confirmed liver cirrhosis.  She was admitted for further treatment of acute hypoxic respiratory failure as well as bilateral atypical pneumonia.  Upon assessment today, she was at the bedside reported noticing interval improvement in shortness of breath and malaise.  She has remained afebrile since yesterday.  She is currently on supplemental oxygen therapy of 2 L/min nasal cannula.  Details of recent history were confirmed.  She quit smoking approximately 1.5 months ago.  She  reports that since admission, she has noticed some improvement of dyspnea with the nebulizer treatments, but awaited the last treatment as this caused some shakiness.      Review Of Systems:    Review of Systems - History obtained from chart review and the patient  General ROS: negative for - chills, fatigue or fever  Psychological ROS: negative for - anxiety or depression  ENT ROS: Positive-headaches.  Negative for - vocal changes  Allergy and Immunology ROS: negative for - nasal congestion or postnasal drip  Endocrine ROS: negative for - polydipsia/polyuria  Respiratory ROS: positive for - shortness of breath (improving), cough  Cardiovascular ROS: positive for - dyspnea on exertion  negative for - chest pain or edema  Gastrointestinal ROS: negative for - abdominal pain or change in bowel habits  Musculoskeletal ROS: negative for - joint pain or joint swelling  Neurological ROS: no TIA or stroke symptoms        History  Past Medical History:   Diagnosis Date   • Anxiety    • Depression    • Diabetes mellitus (CMS/HCC)    • Hyperlipidemia    • Hypertension    , Past Surgical History:   Procedure Laterality Date   • APPENDECTOMY     • CHOLECYSTECTOMY     • HYSTERECTOMY     • TONSILLECTOMY     , History reviewed. No pertinent family history., Social History     Tobacco Use   • Smoking status: Former Smoker     Packs/day: 0.50   Substance Use Topics   • Alcohol use: Not on file   • Drug use: Not on file   , Medications Prior to Admission   Medication Sig Dispense Refill Last Dose   • atorvastatin (LIPITOR) 40 MG tablet Take 40 mg by mouth Every Night.   3/16/2020 at Unknown time   • clonazePAM (KlonoPIN) 0.5 MG tablet Take 0.5 mg by mouth 3 (Three) Times a Day As Needed for Anxiety.   3/16/2020 at Unknown time   • coenzyme Q10 100 MG capsule Take 200 mg by mouth 2 (Two) Times a Day.   3/16/2020 at Unknown time   • DULoxetine (CYMBALTA) 60 MG capsule Take 60 mg by mouth 2 (Two) Times a Day.   3/16/2020 at Unknown time    • estrogens, conjugated, (PREMARIN) 0.625 MG tablet Take 0.625 mg by mouth Daily.   3/16/2020 at Unknown time   • lactobacillus acidophilus (RISAQUAD) capsule capsule Take 1 capsule by mouth Daily.   3/16/2020 at Unknown time   • metoprolol succinate XL (TOPROL-XL) 50 MG 24 hr tablet Take 50 mg by mouth Daily.   3/16/2020 at Unknown time   • multivitamin (DAILY TERRI) tablet tablet Take 1 tablet by mouth Every Night.   3/16/2020 at Unknown time   • pantoprazole (PROTONIX) 40 MG EC tablet Take 40 mg by mouth 2 (Two) Times a Day.   3/16/2020 at Unknown time   • pregabalin (LYRICA) 150 MG capsule Take 150 mg by mouth 2 (Two) Times a Day. Prior to [facility] Admission, Patient was on: pt states that she is supposed to be on 200 mg 3 times daily, I called and got a list from pharmacy and the only one they had filled for her was 150 mg 2 times daily.   3/16/2020 at Unknown time   • QUEtiapine (SEROquel) 200 MG tablet Take 200 mg by mouth Every Night.   3/16/2020 at Unknown time   • ranolazine (RANEXA) 500 MG 12 hr tablet Take 500 mg by mouth 2 (Two) Times a Day.   3/16/2020 at Unknown time   • albuterol sulfate  (90 Base) MCG/ACT inhaler Inhale 2 puffs Every 4 (Four) Hours As Needed for Wheezing.   Unknown at Unknown time   • insulin regular (HumuLIN R) 500 UNIT/ML patient supplied pump Inject  under the skin into the appropriate area as directed Continuous. Prior to [facility] Admission, Patient was on: basal 2.5u per hour      , Scheduled Meds:    budesonide-formoterol 2 puff Inhalation BID - RT   cefTRIAXone 2 g Intravenous Q24H   doxycycline 100 mg Intravenous Q12H   DULoxetine 60 mg Oral BID   estrogens (conjugated) 0.6 mg Oral Daily   ipratropium-albuterol 3 mL Nebulization 4x Daily - RT   lactobacillus acidophilus 1 capsule Oral Daily   methylPREDNISolone sodium succinate 40 mg Intravenous Q12H   metoprolol succinate XL 50 mg Oral Daily   multivitamin 1 tablet Oral Nightly   oseltamivir 75 mg Oral Q12H    pantoprazole 40 mg Oral BID   pregabalin 150 mg Oral Q12H   QUEtiapine 200 mg Oral Nightly   ranolazine 500 mg Oral Q12H   sodium chloride 10 mL Intravenous Q12H   , Continuous Infusions:    insulin regular    Pharmacy Consult - Pharmacy to dose    Pharmacy Consult - Pharmacy to dose     and Allergies:  Morphine    Objective     Vital Signs   Temp:  [97.8 °F (36.6 °C)-100.9 °F (38.3 °C)] 98.6 °F (37 °C)  Heart Rate:  [] 92  Resp:  [12-26] 18  BP: (112-162)/(57-96) 127/73    Physical Exam:               GENERAL APPEARANCE: Well developed, well nourished, alert and cooperative, and appears to be in no acute distress. Sitting up at bedside.     HEAD: normocephalic. Atraumatic.     EYES: PERRL, EOMI. vision is grossly intact.    THROAT: Oral cavity and pharynx normal. No inflammation, swelling, exudate, or lesions.     NECK: Neck supple. No thyromegaly.     CARDIAC: Normal S1 and S2. No S3, S4 or murmurs. Rhythm is regular.     RESPIRATORY: Bilateral air entry positive. Bilateral crackles noted. Wheezing present. No rhonchi. Normal rate, effort.     GI: Positive bowel sounds. Soft, nondistended, nontender.     MUSCULOSKELETAL: No significant deformity or joint abnormality. No edema.     NEUROLOGICAL: Strength and sensation symmetric and intact throughout.     PSYCHIATRIC: The mental examination revealed the patient was oriented to person, place, and time.                 Results Review:    LABS:    Lab Results   Component Value Date    GLUCOSE 142 (H) 03/18/2020    BUN 10 03/18/2020    CREATININE 0.70 03/18/2020    EGFRIFNONA 89 03/18/2020    BCR 14.3 03/18/2020    CO2 22.5 03/18/2020    CALCIUM 8.2 (L) 03/18/2020    ALBUMIN 3.16 (L) 03/18/2020     (H) 03/18/2020    ALT 38 (H) 03/18/2020    WBC 3.59 03/18/2020    HGB 13.2 03/18/2020    HCT 40.6 03/18/2020    .0 (H) 03/18/2020    PLT 60 (L) 03/18/2020     03/18/2020    K 4.0 03/18/2020     03/18/2020    ANIONGAP 14.5 03/18/2020       Lab  Results   Component Value Date    INR 1.15 (H) 03/18/2020    PROTIME 15.3 03/18/2020       Results from last 7 days   Lab Units 03/18/20  0407   INR  1.15*                     I reviewed the patient's new clinical results.  I reviewed the patient's new imaging results and agree with the interpretation.        Erika London PA-C  03/18/20  14:23      Scribed for Dr. Luciano by Erika London PA-C      I have reviewed the past medical history, past surgical history, family history and social history the patient.    I have reviewed the labs.  ABG showed pH of 7.38, PCO2 of 46 and PO2 of 52 on room air.  Cardiac troponin normal.  Elevated serum lactate level.  C-reactive protein elevated.  Uptrending liver enzymes.  Thrombocytopenia with lymphopenia.  Blood cultures are negative till date.  Legionella urine antigen negative.  MRSA DNA probe negative.  Respiratory viral panel positive for influenza H1.  I have reviewed the chest x-ray me that was performed on 3/17/2020 which showed no pneumothorax.  No effusion.  I have reviewed the images of the CT scan of the chest with contrast that was performed on 3/17/2020 which showed centrilobular nodules of right upper lobe and right lower lobe with minimal centrilobular nodule in the left lower lobe consistent with atypical pneumonia.  Reactive lymphadenopathy in the right hilum.  Liver cirrhosis noted.  I have reviewed the EKG report that was performed on 3/17/2020 normal sinus rhythm.  QTC of 459 ms.      Assessment and plan  · Hypoxia requiring supplemental oxygen  · Bilateral pneumonia, right lung greater than left due to influenza  · Acute exacerbation of COPD        Ordered ABG  Titrate FiO2 to keep SPO2 around 90%  On IV ceftriaxone and IV doxycycline  On p.o. Tamiflu  Patient is currently on SCDs as per primary team.  DVT prophylaxis as per primary team  On IV steroids with duo nebs.  I started the patient on Symbicort nebs.  In case of worsening of clinical  condition, I recommend escalating the antibiotics to include coverage for MRSA and Pseudomonas.  In that situation due to thrombocytopenia, I recommend infectious disease team opinion over antibiotic choice.        VTE prophylaxis  Mechanical Order History:      Ordered        03/17/20 1559  Place Sequential Compression Device  Once         03/17/20 1559  Maintain Sequential Compression Device  Continuous                 Pharmalogical Order History:     Ordered     Dose Route Frequency Stop    03/17/20 1540  heparin (porcine) 5000 UNIT/ML injection 5,000 Units  Status:  Discontinued      5,000 Units SC Every 12 Hours Scheduled 03/17/20 1559                 IAndry M.D. attest that the above note accurately reflects the work and decisions made by me.  Patient was seen and evaluated by me, including history of present illness, physical exam, assessment, and treatment plan.  The above note was reviewed and edited by me.    Thank you for involving us in the care of the patient.  Andry Luciano M.D  Pulmonary and Critical Care Medicine                Electronically signed by Andry Luciano MD at 03/19/20 1409             Discharge Order (From admission, onward)     Start     Ordered    03/20/20 1318  Discharge patient  Once     Expected Discharge Date:  03/20/20    Discharge Disposition:  Home or Self Care    Physician of Record for Attribution - Please select from Treatment Team:  CIERRA BARLOW [[id number]]    Review needed by CMO to determine Physician of Record:  No       Question Answer Comment   Physician of Record for Attribution - Please select from Treatment Team CIERRA BARLOW    Review needed by CMO to determine Physician of Record No        03/20/20 9685

## 2020-03-20 NOTE — DISCHARGE INSTR - APPOINTMENTS
Dr Bartolome Recinos  Will  Call   Pt  With  Follow  Up  On  Pt  Discharge  On  March 23  At  1:30  At  Home  And  Pt  Has  An  Apt  Has  An  Apt  With  Dr mortensen  For  April 29  At  3:15

## 2020-03-20 NOTE — PROGRESS NOTES
Discharge Planning Assessment  DEVI Vivar     Patient Name: Tiki Campos  MRN: [id number]  Today's Date: 3/20/2020    Admit Date: 3/17/2020      Discharge Plan     Row Name 03/20/20 1351       Plan    Final Discharge Disposition Code  01 - home or self-care    Final Note  Pt is being discharged home on this date. There are no other needs identified at this time.          Expected Discharge Date and Time     Expected Discharge Date Expected Discharge Time    Mar 20, 2020               Ivone Adamson

## 2020-03-20 NOTE — DISCHARGE SUMMARY
Date of admission: 3/17/2020  Date of discharge: 3/20/2020     Principal diagnosis: Acute hypoxic respiratory failure secondary to influenza H1 N1  Secondary diagnoses:  -Morbid obesity by BMI  -Obstructive sleep apnea  -Diabetes insulin requiring on insulin pump  -Cirrhosis possibly Pickard  -Chronic thrombocytopenia probably related to cirrhosis  -Atypical pneumonia by CT scan with reactive borderline adenopathy on the right hilum mediastinum  -Preserved ejection fraction by echocardiogram this admission  -Hypoxia with ambulation, home O2 being arranged 83% room air with ambulation day of discharge  -Ongoing tobacco use which she was strongly counseled to quit but unfortunately I am not optimistic she will at least at this time.  -Severe sepsis present on admission secondary to influenza     Consultants:  Pulmonology      Procedures:  2D echocardiogram  CT chest with contrast  CT abdomen/pelvis without contrast     Exam: Assisted by, Elena PATRICK.  Patient states she feels much better than admission, still some cough but much improved headache is improved no chest pain tolerating her diet.  She states when she ambulated to the bathroom her saturations did drop on room air.  Nursing staff did walk the patient and with minimal ambulation her saturation did drop to 83% on room air.  Mood is good no distress, I have discussed this case with pulmonology, lungs have bilateral breath sounds no wheezing no rhonchi diminished at the bases heart regular rate and rhythm without murmur rub or gallop abdomen is rounded soft benign extremities no edema no fluid wave, vital signs 118/78, respiratory rate was 20 to my exam with pulse 73 monitor sinus, temp 97.6 saturation on room air laying in the bed was 94%     Hospital course: Patient was admitted with a hypoxic respiratory failure and tested positive for influenza H1.  Patient was treated with Tamiflu, she also had evidence of an atypical pneumonia by CT chest although  this may be related to influenza certainly she was treated with antibiotics while here.  Legionella antigen was negative, PCR for mycoplasma negative, MRSA screen was also negative.  Blood cultures remain negative and her respiratory PCR was only positive for the influenza A.  She was also seen by pulmonology.  Patient improved significantly over the course of treatment and she did have some initial wheezing, she may have an element of COPD and strongly encouraged to quit smoking, she was treated with steroids and wheezing has stopped, will taper steroids as an outpatient.  Patient is diabetic but on her insulin pump glucose have been controlled while here.  Patient does have cirrhosis by imaging, her liver function although appears fairly good, this being the case she sees her primary Dr. Recinos for this and he is already referred her to gastroenterologist.  Her hepatitis profile was negative and this is most likely related to ARCINIEGA.  She still is having some desaturation when walking although her overall oxygenation is much better, she was arranged home oxygen at 2 L nasal cannula.  Condition on discharge stable improved, I discussed this discharge with pulmonology.  Troponins are negative, proBNP normal.  EKG was essentially normal.  With her transaminase elevation I have held her statin.     Diet: Constant carbohydrate     Follow-up:  -Dr. Luciano 3 weeks  -Dr. Recinos, his office is going to call her is due to the viral outbreak they have limited people they are seeing and may be doing telemedicine.     Activity: As tolerated     Medications:  Albuterol HFA 2 puffs every 4 hours as needed  Symbicort 162 puffs twice daily  Omnicef 3 mg twice daily for 7 days  Klonopin 0.5 mg 3 times a day as needed  Cymbalta 60 mg twice daily  Premarin home dose'   insulin pump per previous dosing  Lactobacillus daily  Metoprolol XL 50 mg daily  Multivitamin a day  Tamiflu 75 mg twice daily for 4 doses  Protonix 40 mg a  day  Prednisone taper  Lyrica home dose  Seroquel 200 mg every night  Ranexa 500 mg twice daily    Greater than 30-minute discharge

## 2020-03-20 NOTE — PROGRESS NOTES
Discharge Planning Assessment   [facility]     Patient Name: Tiki Campos  MRN: [id number]  Today's Date: 3/20/2020    Admit Date: 3/17/2020    Discharge Plan     Row Name 03/20/20 1534       Plan    Final Note  Pt is being discharged home and has orders placed for Oxygen therapy. Pt preferred [facility]. SS faxed order to [facility] and spoke with Rosa. No other needs identified.     Row Name 03/20/20 1351       Plan    Final Discharge Disposition Code  01 - home or self-care    Final Note  Pt is being discharged home on this date. There are no other needs identified at this time.          Ivone Adamson

## 2020-03-20 NOTE — PROGRESS NOTES
LOS: 3 days     Chief Complaint:  Pulmonology is following for shortness of breath    Subjective     Interval History: Ms. Campos is resting in bed. She states that she is feeling about the same today.  She reports continued coughing.    She is currently on 2 L/M of supplemental oxygen via nasal cannula and saturating 92%.    History taken from: patient chart RN    Review of Systems - History obtained from chart review and the patient  General ROS: negative for - chills, fatigue or fever  Psychological ROS: negative for - anxiety or depression  ENT ROS: negative for - headaches or hearing change  Allergy and Immunology ROS: negative for - nasal congestion, postnasal drip or seasonal allergies  Endocrine ROS: negative for - polydipsia/polyuria  Respiratory ROS improvement in cough and shortness of breath.  Cardiovascular ROS: positive for - dyspnea on exertion  negative for - chest pain  Gastrointestinal ROS: negative for abdominal pain or nausesa  Musculoskeletal ROS: negative for - joint pain, joint stiffness or joint swelling  Neurological ROS: no TIA or stroke symptoms      Objective     Vital Signs  Temp:  [96.2 °F (35.7 °C)-98 °F (36.7 °C)] 97.2 °F (36.2 °C)  Heart Rate:  [63-87] 73  Resp:  [16-24] 24  BP: (110-128)/(59-82) 117/59  Body mass index is 54.74 kg/m².    Intake/Output Summary (Last 24 hours) at 3/20/2020 1045  Last data filed at 3/20/2020 1026  Gross per 24 hour   Intake 960 ml   Output 3050 ml   Net -2090 ml     I/O this shift:  In: 240 [P.O.:240]  Out: 600 [Urine:600]    Physical Exam:  GENERAL APPEARANCE: Well developed, well nourished, alert and cooperative, and appears to be in no acute distress.    HEAD: normocephalic. atraumatic.    EYES: PERRL, EOMI. Vision is grossly intact.    THROAT: Oral cavity and pharynx normal. No inflammation, swelling, exudate, or lesions.     NECK: Neck supple. No thyromegaly.    CARDIAC: Normal S1 and S2. No S3, S4 or murmurs. Rhythm is regular.     RESPIRATORY:  Bilateral air entry positive. Diminished breath sounds at the lung bases improvement in wheezing.     GI: Positive bowel sounds. Soft, nondistended, nontender.     MUSCULOSKELETAL: No significant deformity or joint abnormality. No edema. Peripheral pulses intact. No varicosities.    NEUROLOGICAL: Strength and sensation symmetric and intact throughout.     PSYCHIATRIC: The mental examination revealed the patient was oriented to person, place, and time.                 Results Review:                I reviewed the patient's new clinical results.  I reviewed the patient's new imaging results and agree with the interpretation.  Results from last 7 days   Lab Units 03/20/20  0425 03/19/20  0516 03/18/20  0407   WBC 10*3/mm3 4.96 4.48 3.59   HEMOGLOBIN g/dL 13.2 13.2 13.2   PLATELETS 10*3/mm3 85* 79* 60*     Results from last 7 days   Lab Units 03/20/20  0425 03/19/20  0516 03/18/20  0407   SODIUM mmol/L 141 137 137   POTASSIUM mmol/L 4.0 4.0 4.0   CHLORIDE mmol/L 106 100 100   CO2 mmol/L 24.2 25.6 22.5   BUN mg/dL 17 14 10   CREATININE mg/dL 0.73 0.80 0.70   CALCIUM mg/dL 8.6 8.4* 8.2*   GLUCOSE mg/dL 158* 102* 142*     Lab Results   Component Value Date    INR 1.15 (H) 03/18/2020    PROTIME 15.3 03/18/2020     Results from last 7 days   Lab Units 03/20/20  0425 03/19/20  0516 03/18/20  0407   ALK PHOS U/L 76 78 82   BILIRUBIN mg/dL 0.3 0.4 0.5   ALT (SGPT) U/L 35* 38* 38*   AST (SGOT) U/L 79* 101* 112*     Results from last 7 days   Lab Units 03/18/20  1357   PH, ARTERIAL pH units 7.392   PO2 ART mm Hg 79.8*   PCO2, ARTERIAL mm Hg 44.1   HCO3 ART mmol/L 26.8*     Imaging Results (Last 24 Hours)     ** No results found for the last 24 hours. **             Medication Review:   Scheduled Medications:    budesonide-formoterol 2 puff Inhalation BID - RT   cefTRIAXone 2 g Intravenous Q24H   DULoxetine 60 mg Oral BID   estrogens (conjugated) 0.6 mg Oral Daily   ipratropium 0.5 mg Nebulization 4x Daily - RT   lactobacillus  acidophilus 1 capsule Oral Daily   methylPREDNISolone sodium succinate 20 mg Intravenous Q12H   metoprolol succinate XL 50 mg Oral Daily   multivitamin 1 tablet Oral Nightly   oseltamivir 75 mg Oral Q12H   pantoprazole 40 mg Oral BID   pregabalin 150 mg Oral Q12H   QUEtiapine 200 mg Oral Nightly   ranolazine 500 mg Oral Q12H   sodium chloride 10 mL Intravenous Q12H   sodium chloride 10 mL Intravenous Q12H     Continuous infusions:    insulin SOFY Edgar  03/20/20  10:45          I have seen and examined the patient personally and performed a face-to-face diagnostic evaluation. The plan of care was reviewed and developed with APRN and nursing staff. I have addended and/or modified the above history of present illness, physical examination, and assessment/plan to reflect my findings and impressions. Essential elements of the care plan were discussed with APRN above.  I agree with the findings and assessment/plan as documented below.             I have reviewed the labs.  No leukocytosis.  Serum creatinine normal    Assessment and plan:  · Hypoxia requiring supplemental oxygen  · Bilateral pneumonia, right lung greater than left due to influenza  · Acute exacerbation of COPD  · GIBRAN/OHS     Titrate FiO2 to keep SPO2 around 90%  On p.o. Tamiflu  On Symbicort nebs, duo nebs and p.o. prednisone  On positive pressure therapy with HS and PRN.  .              VTE prophylaxis  Mechanical Order History:      Ordered        03/17/20 1559  Place Sequential Compression Device  Once         03/17/20 1559  Maintain Sequential Compression Device  Continuous                 Pharmalogical Order History:     Ordered     Dose Route Frequency Stop    03/17/20 1540  heparin (porcine) 5000 UNIT/ML injection 5,000 Units  Status:  Discontinued      5,000 Units SC Every 12 Hours Scheduled 03/17/20 1559              I have discussed the clinical plan with Dr. Dunaway.  Thank you for involving us in the care of the  patient.  Andry Luciano M.D  Pulmonary and Critical Care Medicine

## 2020-03-20 NOTE — PLAN OF CARE
Pt resting with no complaints. In no acute distress. Will continue to monitor and follow plan of care.

## 2020-03-20 NOTE — NURSING NOTE
Taking over care at this time. Agree with previous nurse assessment. Resting comfortably. No signs or symptoms of distress.

## 2020-03-20 NOTE — PROGRESS NOTES
Date of admission: 3/17/2020  Date of discharge: 3/20/2020    Principal diagnosis: Acute hypoxic respiratory failure secondary to influenza H1 N1  Secondary diagnoses:  -Morbid obesity by BMI  -Obstructive sleep apnea  -Diabetes insulin requiring on insulin pump  -Cirrhosis possibly Pickard  -Chronic thrombocytopenia probably related to cirrhosis  -Atypical pneumonia by CT scan with reactive borderline adenopathy on the right hilum mediastinum  -Preserved ejection fraction by echocardiogram this admission  -Hypoxia with ambulation, home O2 being arranged 83% room air with ambulation day of discharge  -Ongoing tobacco use which she was strongly counseled to quit but unfortunately I am not optimistic she will at least at this time.  -Severe sepsis present on admission secondary to influenza    Consultants:  Pulmonology     Procedures:  2D echocardiogram  CT chest with contrast  CT abdomen/pelvis without contrast    Exam: Assisted by, Elena PATRICK.  Patient states she feels much better than admission, still some cough but much improved headache is improved no chest pain tolerating her diet.  She states when she ambulated to the bathroom her saturations did drop on room air.  Nursing staff did walk the patient and with minimal ambulation her saturation did drop to 83% on room air.  Mood is good no distress, I have discussed this case with pulmonology, lungs have bilateral breath sounds no wheezing no rhonchi diminished at the bases heart regular rate and rhythm without murmur rub or gallop abdomen is rounded soft benign extremities no edema no fluid wave, vital signs 118/78, respiratory rate was 20 to my exam with pulse 73 monitor sinus, temp 97.6 saturation on room air laying in the bed was 94%    Hospital course: Patient was admitted with a hypoxic respiratory failure and tested positive for influenza H1.  Patient was treated with Tamiflu, she also had evidence of an atypical pneumonia by CT chest although this  may be related to influenza certainly she was treated with antibiotics while here.  Legionella antigen was negative, PCR for mycoplasma negative, MRSA screen was also negative.  Blood cultures remain negative and her respiratory PCR was only positive for the influenza A.  She was also seen by pulmonology.  Patient improved significantly over the course of treatment and she did have some initial wheezing, she may have an element of COPD and strongly encouraged to quit smoking, she was treated with steroids and wheezing has stopped, will taper steroids as an outpatient.  Patient is diabetic but on her insulin pump glucose have been controlled while here.  Patient does have cirrhosis by imaging, her liver function although appears fairly good, this being the case she sees her primary Dr. Recinos for this and he is already referred her to gastroenterologist.  Her hepatitis profile was negative and this is most likely related to ARCINIEGA.  She still is having some desaturation when walking although her overall oxygenation is much better, she was arranged home oxygen at 2 L nasal cannula.  Condition on discharge stable improved, I discussed this discharge with pulmonology.  Troponins are negative, proBNP normal.  EKG was essentially normal.  With her transaminase elevation I have held her statin.    Diet: Constant carbohydrate    Follow-up:  -Dr. Luciano 3 weeks  -Dr. Recinos, his office is going to call her is due to the viral outbreak they have limited people they are seeing and may be doing telemedicine.    Activity: As tolerated    Medications:  Albuterol HFA 2 puffs every 4 hours as needed  Symbicort 162 puffs twice daily  Omnicef 3 mg twice daily for 7 days  Klonopin 0.5 mg 3 times a day as needed  Cymbalta 60 mg twice daily  Premarin home dose'   insulin pump per previous dosing  Lactobacillus daily  Metoprolol XL 50 mg daily  Multivitamin a day  Tamiflu 75 mg twice daily for 4 doses  Protonix 40 mg a day  Prednisone  taper  Lyrica home dose  Seroquel 200 mg every night  Ranexa 500 mg twice daily

## 2020-03-21 ENCOUNTER — READMISSION MANAGEMENT (OUTPATIENT)
Dept: CALL CENTER | Facility: HOSPITAL | Age: 50
End: 2020-03-21

## 2020-03-21 NOTE — OUTREACH NOTE
Prep Survey      Responses   [facility] patient [facility] from?  [facility]   Is LACE score < 7 ?  No   Eligibility  Readm Mgmt   Discharge diagnosis  Acute hypoxic resp. failure d/t influenza H1N1. morbid obesity, GIBRAN, IDDM on Insulin pump. Cirrhosis Poss ARCINIEGA, Atypical Pneumonia, ongoing tobacco use, Severe sepsis POA   Does the patient have one of the following disease processes/diagnoses(primary or secondary)?  COPD/Pneumonia   Does the patient have Home health ordered?  No   Is there a DME ordered?  Yes   What DME was ordered?  [facility] for home O2   Comments regarding appointments  Office will call   Prep survey completed?  Yes          Venus Zapien RN

## 2020-03-21 NOTE — PAYOR COMM NOTE
"[facility]  JULIO BUSH  PHONE  [phone]  FAX  [phone]  NPI:  [id number]    PATIENT  D/C 3/20/2020      Tiki Campos (50 y.o. Female)     -D-ate of Birth Social Security Number Address Home Phone MRN    1970  [address]  [place] [address] [phone] [id number]    Holiness Marital Status          Baptism        Admission Date Admission Type Admitting Provider Attending Provider Department, Room/Bed    3/17/20 Emergency Marcelino Dunaway MD  [facility], 3323/1P    Discharge Date Discharge Disposition Discharge Destination        3/20/2020 Home or Self Care              Attending Provider:  (none)   Allergies:  Morphine    Isolation:  Droplet   Infection:  Influenza (03/17/20)   Code Status:  Prior    Ht:  160 cm (63\")   Wt:  140 kg (309 lb)    Admission Cmt:  None   Principal Problem:  None                Active Insurance as of 3/17/2020     Primary Coverage     Payor Plan Insurance Group Employer/Plan Group    [employer] [facility] [employer] BLUE SHIELD PPO [id number]     Payor Plan Address Payor Plan Phone Number Payor Plan Fax Number Effective Dates    [address] [phone]  1/1/2019 - None Entered    [facility] [address]       Subscriber Name Subscriber Birth Date Member ID       MANE CAMPOS 1970 [id number]                 Emergency Contacts      (Rel.) Home Phone Work Phone Mobile Phone    LORE KEARNS (Sister) [phone] -- [phone]              "

## 2020-03-22 LAB
BACTERIA SPEC AEROBE CULT: NORMAL
BACTERIA SPEC AEROBE CULT: NORMAL

## 2020-03-24 ENCOUNTER — READMISSION MANAGEMENT (OUTPATIENT)
Dept: CALL CENTER | Facility: HOSPITAL | Age: 50
End: 2020-03-24

## 2020-03-24 NOTE — OUTREACH NOTE
COPD/PN Week 1 Survey      Responses   [facility] patient discharged from?  [facility]   Does the patient have one of the following disease processes/diagnoses(primary or secondary)?  COPD/Pneumonia   Is there a successful TCM telephone encounter documented?  No   Was the primary reason for admission:  Pneumonia   Week 1 attempt successful?  Yes   Call start time  1708   Rescheduled  Rescheduled-pt requested [Sister reports a call back is best for an update. ]   Call end time  1709   Discharge diagnosis  Acute hypoxic resp. failure d/t influenza H1N1. morbid obesity, GIBRAN, IDDM on Insulin pump. Cirrhosis Poss ARCINIEGA, Atypical Pneumonia, ongoing tobacco use, Severe sepsis POA   Person spoke with today (if not patient) and relationship  Rosita-sister          Saba Mancia RN

## 2020-03-25 NOTE — PAYOR COMM NOTE
"[facility]  NPI:[id number]    Utilization Review  Contact: Geneva Bee RN  Phone: [phone]  Fax:[phone]    CLINICAL UPDATE    ADDITIONAL DAY FOR 3/19/2020  AUTH # [id number]  ATTENTION: Tiki Ch (50 y.o. Female)     Date of Birth Social Security Number Address Home Phone MRN    1970  [address]  [place] [address] [phone] [id number]    Mandaeism Marital Status          Yazidi        Admission Date Admission Type Admitting Provider Attending Provider Department, Room/Bed    3/17/20 Emergency Marcelino Dunaway MD  [address], [address]/    Discharge Date Discharge Disposition Discharge Destination        3/20/2020 Home or Self Care              Attending Provider:  (none)   Allergies:  Morphine    Isolation:  Droplet   Infection:  Influenza (03/17/20)   Code Status:  Prior    Ht:  160 cm (63\")   Wt:  140 kg (309 lb)    Admission Cmt:  None   Principal Problem:  None                Active Insurance as of 3/17/2020     Primary Coverage     Payor Plan Insurance Group Employer/Plan Group    [employer]HEM BLUE CROSS ANTHEM BLUE CROSS BLUE SHIELD PPO [id number]     Payor Plan Address Payor Plan Phone Number Payor Plan Fax Number Effective Dates    [address] [phone]  1/1/2019 - None Entered    [place] [address]       Subscriber Name Subscriber Birth Date Member ID       MANE PERSAUD 1970 [id number]                 Emergency Contacts      (Rel.) Home Phone Work Phone Mobile Phone    LORE KEARNS (Sister) [phone] -- [phone]            Andry Luciano MD   Physician   Pulmonology   Progress Notes   Signed   Date of Service:  03/19/20 1019   Creation Time:  03/19/20 1019            Signed        Expand All Collapse All     Show:Clear all  [x]Manual[x]Template[x]Copied    Added by:  [x]Adelaida Pugh APRN[x]Andry Luciano MD    []Hover for details   LOS: 2 days      Chief Complaint:  " Pulmonology is following for shortness of breath        Subjective         Interval History: Ms. Campos is resting in bed.  She states that her breathing feels a little more tight today.  She states that she is coughing but is having difficulty coughing up secretions.  She is currently on 2 L/min of supplemental oxygen via nasal cannula saturating 96%.     History taken from: patient chart RN     Review of Systems - History obtained from chart review and the patient  General ROS: negative for - chills, fatigue or fever  Psychological ROS: negative for - anxiety or depression  ENT ROS: negative for - headaches or hearing change  Allergy and Immunology ROS: negative for - nasal congestion, postnasal drip or seasonal allergies  Endocrine ROS: negative for - polydipsia/polyuria  Respiratory ROS: Positive cough and shortness of breath  Cardiovascular ROS: positive for - dyspnea on exertion  Gastrointestinal ROS: no abdominal pain, change in bowel habits, or black or bloody stools  Musculoskeletal ROS: negative for - joint pain, joint stiffness or joint swelling  Neurological ROS: no TIA or stroke symptoms           Objective         Vital Signs  Temp:  [97.5 °F (36.4 °C)-98.9 °F (37.2 °C)] 98 °F (36.7 °C)  Heart Rate:  [74-92] 80  Resp:  [18-20] 18  BP: (101-150)/(56-74) 130/64  Body mass index is 54.81 kg/m².     Intake/Output Summary (Last 24 hours) at 3/19/2020 1019  Last data filed at 3/19/2020 0845      Gross per 24 hour   Intake 1005 ml   Output 1500 ml   Net -495 ml      I/O this shift:  In: 240 [P.O.:240]  Out: -      Physical Exam:  GENERAL APPEARANCE: Well developed, well nourished, alert and cooperative, and appears to be in no acute distress.     HEAD: normocephalic. atraumatic.     EYES: PERRL, EOMI. Vision is grossly intact.     THROAT: Oral cavity and pharynx normal. No inflammation, swelling, exudate, or lesions.      NECK: Neck supple. No thyromegaly.     CARDIAC: Normal S1 and S2. No S3, S4 or murmurs.  Rhythm is regular.      RESPIRATORY: Bilateral air entry positive. Diminished breath sounds at the lung bases.  Bilateral wheezing noted     GI: Positive bowel sounds. Soft, nondistended, nontender.      MUSCULOSKELETAL: No significant deformity or joint abnormality. No edema. Peripheral pulses intact. No varicosities.     NEUROLOGICAL: Strength and sensation symmetric and intact throughout.      PSYCHIATRIC: The mental examination revealed the patient was oriented to person, place, and time.                 Results Review:                I reviewed the patient's new clinical results.  I reviewed the patient's new imaging results and agree with the interpretation.         Results from last 7 days   Lab Units 03/19/20  0516 03/18/20  0407 03/17/20  0923   WBC 10*3/mm3 4.48 3.59 4.83   HEMOGLOBIN g/dL 13.2 13.2 13.8   PLATELETS 10*3/mm3 79* 60* 79*             Results from last 7 days   Lab Units 03/19/20  0516 03/18/20  0407 03/17/20  0923   SODIUM mmol/L 137 137 138   POTASSIUM mmol/L 4.0 4.0 4.1   CHLORIDE mmol/L 100 100 100   CO2 mmol/L 25.6 22.5 24.9   BUN mg/dL 14 10 8   CREATININE mg/dL 0.80 0.70 0.77   CALCIUM mg/dL 8.4* 8.2* 8.5*   GLUCOSE mg/dL 102* 142* 167*            Lab Results   Component Value Date     INR 1.15 (H) 03/18/2020     PROTIME 15.3 03/18/2020             Results from last 7 days   Lab Units 03/19/20  0516 03/18/20  0407 03/17/20  0923   ALK PHOS U/L 78 82 90   BILIRUBIN mg/dL 0.4 0.5 0.7   ALT (SGPT) U/L 38* 38* 28   AST (SGOT) U/L 101* 112* 63*           Results from last 7 days   Lab Units 03/18/20  1357   PH, ARTERIAL pH units 7.392   PO2 ART mm Hg 79.8*   PCO2, ARTERIAL mm Hg 44.1   HCO3 ART mmol/L 26.8*          Imaging Results (Last 24 Hours)      ** No results found for the last 24 hours. **                Medication Review:   Scheduled Medications:     budesonide-formoterol 2 puff Inhalation BID - RT   cefTRIAXone 2 g Intravenous Q24H   doxycycline 100 mg Intravenous Q12H      DULoxetine 60 mg Oral BID   estrogens (conjugated) 0.6 mg Oral Daily   ipratropium 0.5 mg Nebulization 4x Daily - RT   lactobacillus acidophilus 1 capsule Oral Daily   methylPREDNISolone sodium succinate 40 mg Intravenous Q12H   metoprolol succinate XL 50 mg Oral Daily   multivitamin 1 tablet Oral Nightly   oseltamivir 75 mg Oral Q12H   pantoprazole 40 mg Oral BID   pregabalin 150 mg Oral Q12H   QUEtiapine 200 mg Oral Nightly   ranolazine 500 mg Oral Q12H   sodium chloride 10 mL Intravenous Q12H   sodium chloride 10 mL Intravenous Q12H      Continuous infusions:     insulin regular     Pharmacy Consult - Pharmacy to dose     Pharmacy Consult - Pharmacy to dose           SOFY Rangel  03/19/20  10:19              I have seen and examined the patient personally and performed a face-to-face diagnostic evaluation. The plan of care was reviewed and developed with APRN and nursing staff. I have addended and/or modified the above history of present illness, physical examination, and assessment/plan to reflect my findings and impressions. Essential elements of the care plan were discussed with APRN above.  I agree with the findings and assessment/plan as documented below.        I have reviewed the labs.  Showed pH of 7.39, PCO2 44 and PO2 of 79.8.  Serum creatinine normal.  C-reactive protein trending down.  Leukocytosis.  ANC normal.         Assessment and plan:  · Hypoxia requiring supplemental oxygen  · Bilateral pneumonia, right lung greater than left due to influenza  · Acute exacerbation of COPD     Titrate FiO2 to keep SPO2 around 90%.  On IV ceftriaxone and IV doxycycline  On p.o. Tamiflu  On Symbicort nebs, duo nebs IV steroids  In case of worsening of clinical condition, I recommend escalating the antibiotics to include coverage for MRSA and Pseudomonas.  In that situation due to thrombocytopenia, I recommend infectious disease team opinion over antibiotic choice.                                    VTE prophylaxis             Mechanical Order History:                          Ordered          03/17/20 1559   Place Sequential Compression Device  Once           03/17/20 1559   Maintain Sequential Compression Device  Continuous                                          Pharmalogical Order History:      Ordered     Dose Route Frequency Stop     03/17/20 1540   heparin (porcine) 5000 UNIT/ML injection 5,000 Units  Status:  Discontinued      5,000 Units SC Every 12 Hours Scheduled 03/17/20 1559                Thank you for involving us in the care of the patient.  Andry Luciano M.D  Pulmonary and Critical Care Medicine                           Revision History                                   Marcelino Dunaway MD   Physician   Medicine   Progress Notes   Signed   Date of Service:  03/19/20 1454   Creation Time:  03/19/20 1454            Signed            Show:Clear all  [x]Manual[x]Template[]Copied    Added by:  [x]Marcelino Dunaway MD    []Elizabeth for details  Assisted By: Kacie PATRICK     CC: Follow-up respiratory failure, flu, pneumonia.     Interview History/HPI: Patient states she is feeling better still cough but improved, minimal phlegm production.  She is tolerating her diet, no chest pain.  Less shortness of breath.  She was still smoking at home but has agreed to leave to try to quit.            Vitals:     03/19/20 1325   BP:     Pulse: 63   Resp: 22   Temp:     SpO2: 98%            Intake/Output Summary (Last 24 hours) at 3/19/2020 1454  Last data filed at 3/19/2020 1325      Gross per 24 hour   Intake 920 ml   Output 2450 ml   Net -1530 ml         EXAM: 110/80, temperature is 96.8.  Morbidly obese by BMI, she appears overall to feel better, her oxygen was actually to the side of her face, saturation was 95 to 96%.  She is awake alert and in no distress, no retractions, lungs have bilateral breath sounds are clear no rhonchi rales or wheezing heard, heart regular rate and rhythm without murmur rub or  gallop.  Abdomen rounded soft benign extremities are without significant edema strength is symmetric skin warm and dry     Tele: Sinus, reviewed     LABS:            Lab Results (last 48 hours)      Procedure Component Value Units Date/Time     POC Glucose Once [[id number]]  (Abnormal) Collected:  03/19/20 1054     Specimen:  Blood Updated:  03/19/20 1111       Glucose 135 mg/dL       Blood Culture - Blood, Arm, Right [[id number]] Collected:  03/17/20 0949     Specimen:  Blood from Arm, Right Updated:  03/19/20 1000       Blood Culture No growth at 2 days     Blood Culture - Blood, Arm, Left [[id number]] Collected:  03/17/20 0923     Specimen:  Blood from Arm, Left Updated:  03/19/20 0930       Blood Culture No growth at 2 days     POC Glucose Once [[id number]]  (Normal) Collected:  03/19/20 0747     Specimen:  Blood Updated:  03/19/20 0756       Glucose 97 mg/dL       Comprehensive Metabolic Panel [[id number]]  (Abnormal) Collected:  03/19/20 0516     Specimen:  Blood Updated:  03/19/20 0726       Glucose 102 mg/dL         BUN 14 mg/dL         Creatinine 0.80 mg/dL         Sodium 137 mmol/L         Potassium 4.0 mmol/L         Chloride 100 mmol/L         CO2 25.6 mmol/L         Calcium 8.4 mg/dL         Total Protein 6.6 g/dL         Albumin 3.10 g/dL         ALT (SGPT) 38 U/L         AST (SGOT) 101 U/L         Alkaline Phosphatase 78 U/L         Total Bilirubin 0.4 mg/dL         eGFR Non African Amer 76 mL/min/1.73         Globulin 3.5 gm/dL         A/G Ratio 0.9 g/dL         BUN/Creatinine Ratio 17.5       Anion Gap 11.4 mmol/L       Narrative:        GFR Normal >60  Chronic Kidney Disease <60  Kidney Failure <15        C-reactive Protein [[id number]]  (Abnormal) Collected:  03/19/20 0516     Specimen:  Blood Updated:  03/19/20 0726       C-Reactive Protein 2.55 mg/dL       Phosphorus [[id number]]  (Normal) Collected:  03/19/20 0516     Specimen:  Blood Updated:  03/19/20 0726       Phosphorus 4.1 mg/dL       CBC &  Differential [[id number]] Collected:  03/19/20 0516     Specimen:  Blood Updated:  03/19/20 0636     Narrative:        The following orders were created for panel order CBC & Differential.  Procedure                               Abnormality         Status                     ---------                               -----------         ------                     CBC Auto Differential[[id number]]        Abnormal            Final result                  Please view results for these tests on the individual orders.     CBC Auto Differential [[id number]]  (Abnormal) Collected:  03/19/20 0516     Specimen:  Blood Updated:  03/19/20 0636       WBC 4.48 10*3/mm3         RBC 4.05 10*6/mm3         Hemoglobin 13.2 g/dL         Hematocrit 41.6 %         .7 fL         MCH 32.6 pg         MCHC 31.7 g/dL         RDW 13.3 %         RDW-SD 50.7 fl         MPV 12.6 fL         Platelets 79 10*3/mm3         Neutrophil % 58.1 %         Lymphocyte % 28.3 %         Monocyte % 13.2 %         Eosinophil % 0.0 %         Basophil % 0.0 %         Immature Grans % 0.4 %         Neutrophils, Absolute 2.60 10*3/mm3         Lymphocytes, Absolute 1.27 10*3/mm3         Monocytes, Absolute 0.59 10*3/mm3         Eosinophils, Absolute 0.00 10*3/mm3         Basophils, Absolute 0.00 10*3/mm3         Immature Grans, Absolute 0.02 10*3/mm3         nRBC 0.0 /100 WBC       POC Glucose Once [[id number]]  (Abnormal) Collected:  03/18/20 1820     Specimen:  Blood Updated:  03/18/20 1826       Glucose 229 mg/dL       POC Glucose Once [[id number]]  (Abnormal) Collected:  03/18/20 1521     Specimen:  Blood Updated:  03/18/20 1602       Glucose 267 mg/dL       Blood Gas, Arterial With Co-Ox [[id number]]  (Abnormal) Collected:  03/18/20 1357     Specimen:  Arterial Blood Updated:  03/18/20 1413       Site Left Radial       Neal's Test Positive       pH, Arterial 7.392 pH units         pCO2, Arterial 44.1 mm Hg         pO2, Arterial 79.8 mm Hg         Comment: 84  Value below reference range          HCO3, Arterial 26.8 mmol/L         Comment: 83 Value above reference range          Base Excess, Arterial 1.4 mmol/L         O2 Saturation, Arterial 96.4 %         Hemoglobin, Blood Gas 14.0 g/dL         Hematocrit, Blood Gas 43.0 %         Oxyhemoglobin 95.1 %         Methemoglobin 0.00 %         Carboxyhemoglobin 1.3 %         A-a Gradiant 62.5 mmHg         CO2 Content 28.2 mmol/L         Temperature 0.0 C         Barometric Pressure for Blood Gas 731 mmHg         Modality Nasal Cannula       FIO2 28 %         Ventilator Mode NA       Note --       Collected by [id number]       Comment: Meter: [id number]     :  [id number]          pH, Temp Corrected --       pCO2, Temperature Corrected --       pO2, Temperature Corrected --     Folate [[id number]]  (Normal) Collected:  03/18/20 0407     Specimen:  Blood Updated:  03/18/20 1223       Folate >20.00 ng/mL       Narrative:        Results may be falsely increased if patient taking Biotin.        Vitamin B12 [[id number]]  (Normal) Collected:  03/18/20 0407     Specimen:  Blood Updated:  03/18/20 1223       Vitamin B-12 705 pg/mL       Narrative:        Results may be falsely increased if patient taking Biotin.        POC Glucose Once [[id number]]  (Abnormal) Collected:  03/18/20 1150     Specimen:  Blood Updated:  03/18/20 1158       Glucose 164 mg/dL       Hemoglobin A1c [[id number]]  (Abnormal) Collected:  03/18/20 0407     Specimen:  Blood Updated:  03/18/20 0557       Hemoglobin A1C 7.50 %       Narrative:        Hemoglobin A1C Ranges:     Increased Risk for Diabetes  5.7% to 6.4%  Diabetes                     >= 6.5%  Diabetic Goal                < 7.0%     POC Glucose Once [[id number]]  (Abnormal) Collected:  03/18/20 0525     Specimen:  Blood Updated:  03/18/20 0553       Glucose 144 mg/dL       Procalcitonin [[id number]]  (Normal) Collected:  03/17/20 1559     Specimen:  Blood Updated:  03/18/20 0550       Procalcitonin  "0.12 ng/mL       Narrative:        As a Marker for Sepsis (Non-Neonates):   1. <0.5 ng/mL represents a low risk of severe sepsis and/or septic shock.  1. >2 ng/mL represents a high risk of severe sepsis and/or septic shock.     As a Marker for Lower Respiratory Tract Infections that require antibiotic therapy:  PCT on Admission     Antibiotic Therapy             6-12 Hrs later  > 0.5                Strongly Recommended            >0.25 - <0.5         Recommended  0.1 - 0.25           Discouraged                   Remeasure/reassess PCT  <0.1                 Strongly Discouraged          Remeasure/reassess PCT       As 28 day mortality risk marker: \"Change in Procalcitonin Result\" (> 80 % or <=80 %) if Day 0 (or Day 1) and Day 4 values are available. Refer to http://www.MomentCampct-calculator.com/   Change in PCT <=80 %   A decrease of PCT levels below or equal to 80 % defines a positive change in PCT test result representing a higher risk for 28-day all-cause mortality of patients diagnosed with severe sepsis or septic shock.  Change in PCT > 80 %   A decrease of PCT levels of more than 80 % defines a negative change in PCT result representing a lower risk for 28-day all-cause mortality of patients diagnosed with severe sepsis or septic shock.                       Results may be falsely decreased if patient taking Biotin.      Lactic Acid, Plasma [[id number]]  (Abnormal) Collected:  03/18/20 0407     Specimen:  Blood Updated:  03/18/20 0452       Lactate 2.1 mmol/L       Troponin [[id number]]  (Normal) Collected:  03/18/20 0407     Specimen:  Blood Updated:  03/18/20 0451       Troponin T <0.010 ng/mL       Narrative:        Troponin T Reference Range:  <= 0.03 ng/mL-   Negative for AMI  >0.03 ng/mL-     Abnormal for myocardial necrosis.  Clinicians would have to utilize clinical acumen, EKG, Troponin and serial changes to determine if it is an Acute Myocardial Infarction or myocardial injury due to an underlying " chronic condition.         Results may be falsely decreased if patient taking Biotin.        Comprehensive Metabolic Panel [[id number]]  (Abnormal) Collected:  03/18/20 0407     Specimen:  Blood Updated:  03/18/20 0448       Glucose 142 mg/dL         BUN 10 mg/dL         Creatinine 0.70 mg/dL         Sodium 137 mmol/L         Potassium 4.0 mmol/L         Chloride 100 mmol/L         CO2 22.5 mmol/L         Calcium 8.2 mg/dL         Total Protein 6.7 g/dL         Albumin 3.16 g/dL         ALT (SGPT) 38 U/L         AST (SGOT) 112 U/L         Alkaline Phosphatase 82 U/L         Total Bilirubin 0.5 mg/dL         eGFR Non African Amer 89 mL/min/1.73         Globulin 3.5 gm/dL         A/G Ratio 0.9 g/dL         BUN/Creatinine Ratio 14.3       Anion Gap 14.5 mmol/L       Narrative:        GFR Normal >60  Chronic Kidney Disease <60  Kidney Failure <15        C-reactive Protein [[id number]]  (Abnormal) Collected:  03/18/20 0407     Specimen:  Blood Updated:  03/18/20 0448       C-Reactive Protein 5.80 mg/dL       Protime-INR [[id number]]  (Abnormal) Collected:  03/18/20 0407     Specimen:  Blood Updated:  03/18/20 0446       Protime 15.3 Seconds         INR 1.15     Narrative:        Suggested INR therapeutic range for stable oral anticoagulant therapy:     Low Intensity therapy:   1.5-2.0  Moderate Intensity therapy:   2.0-3.0  High Intensity therapy:   2.5-4.0     CBC & Differential [[id number]] Collected:  03/18/20 0407     Specimen:  Blood Updated:  03/18/20 0430     Narrative:        The following orders were created for panel order CBC & Differential.  Procedure                               Abnormality         Status                     ---------                               -----------         ------                     CBC Auto Differential[[id number]]        Abnormal            Final result                  Please view results for these tests on the individual orders.     CBC Auto Differential [[id number]]  (Abnormal)  Collected:  03/18/20 0407     Specimen:  Blood Updated:  03/18/20 0430       WBC 3.59 10*3/mm3         RBC 4.02 10*6/mm3         Hemoglobin 13.2 g/dL         Hematocrit 40.6 %         .0 fL         MCH 32.8 pg         MCHC 32.5 g/dL         RDW 13.4 %         RDW-SD 50.4 fl         MPV 12.0 fL         Platelets 60 10*3/mm3         Neutrophil % 71.6 %         Lymphocyte % 17.5 %         Monocyte % 10.3 %         Eosinophil % 0.0 %         Basophil % 0.3 %         Immature Grans % 0.3 %         Neutrophils, Absolute 2.57 10*3/mm3         Lymphocytes, Absolute 0.63 10*3/mm3         Monocytes, Absolute 0.37 10*3/mm3         Eosinophils, Absolute 0.00 10*3/mm3         Basophils, Absolute 0.01 10*3/mm3         Immature Grans, Absolute 0.01 10*3/mm3         nRBC 0.0 /100 WBC       Troponin [[id number]]  (Normal) Collected:  03/17/20 2321     Specimen:  Blood Updated:  03/17/20 2351       Troponin T <0.010 ng/mL       Narrative:        Troponin T Reference Range:  <= 0.03 ng/mL-   Negative for AMI  >0.03 ng/mL-     Abnormal for myocardial necrosis.  Clinicians would have to utilize clinical acumen, EKG, Troponin and serial changes to determine if it is an Acute Myocardial Infarction or myocardial injury due to an underlying chronic condition.         Results may be falsely decreased if patient taking Biotin.        POC Glucose Once [[id number]]  (Abnormal) Collected:  03/17/20 2112     Specimen:  Blood Updated:  03/17/20 2121       Glucose 134 mg/dL       MRSA Screen, PCR - Swab, Nares [[id number]]  (Normal) Collected:  03/17/20 1623     Specimen:  Swab from Nares Updated:  03/17/20 1830       MRSA PCR Negative       Staph aureus by PCR Negative     Troponin [[id number]]  (Normal) Collected:  03/17/20 1648     Specimen:  Blood Updated:  03/17/20 1712       Troponin T <0.010 ng/mL       Narrative:        Troponin T Reference Range:  <= 0.03 ng/mL-   Negative for AMI  >0.03 ng/mL-     Abnormal for myocardial necrosis.   Clinicians would have to utilize clinical acumen, EKG, Troponin and serial changes to determine if it is an Acute Myocardial Infarction or myocardial injury due to an underlying chronic condition.         Results may be falsely decreased if patient taking Biotin.        Legionella Antigen, Urine - Urine, Urine, Clean Catch [[id number]]  (Normal) Collected:  03/17/20 0949     Specimen:  Urine, Clean Catch Updated:  03/17/20 1647       LEGIONELLA ANTIGEN, URINE Negative     Narrative:        Presumptive negative for L. pneumophilia serogroup 1 antigen, suggesting no recent or current infection.     POC Glucose Once [[id number]]  (Normal) Collected:  03/17/20 1622     Specimen:  Blood Updated:  03/17/20 1641       Glucose 88 mg/dL       Pregnancy, Urine - Urine, Clean Catch [[id number]]  (Normal) Collected:  03/17/20 0949     Specimen:  Urine, Clean Catch Updated:  03/17/20 1616       HCG, Urine QL Negative     Narrative:        Diluted specimens may cause false negative results.     C-reactive Protein [[id number]]  (Abnormal) Collected:  03/17/20 0923     Specimen:  Blood from Arm, Left Updated:  03/17/20 1606       C-Reactive Protein 2.46 mg/dL               Radiology:              Imaging Results (Last 72 Hours)      Procedure Component Value Units Date/Time     CT Head Without Contrast [[id number]] Collected:  03/17/20 1642       Updated:  03/17/20 1645     Narrative:        EXAMINATION: CT HEAD WO CONTRAST-      CLINICAL INDICATION:     HA; J10.1-Influenza due to other identified  influenza virus with other respiratory manifestations; J18.9-Pneumonia,  unspecified organism     COMPARISON:    None     Technique: Multiple CT axial images were obtained through the level of  the brain without IV contrast administration. Reformatted images in the  coronal and/or sagittal plane(s) were generated from the axial data set  to facilitate diagnostic accuracy and/or surgical planning.     Radiation dose reduction techniques were  utilized per ALARA protocol.  Automated exposure control was initiated through either or [employer] or  D[employer] software packages by  protocol.       DOSE (DLP mGy-cm):     FINDINGS:     Brain: Unremarkable. No parenchymal hemorrhage or mass. No white matter  abnormality. No areas of mass effect.  Ventricles: Unremarkable. No hydrocephalus.  Extra-axial spaces: Unremarkable. No extra-axial hemorrhage. No  extra-axial masses.  Bones: Unremarkable. No acute fracture identified.  Sinuses: Unremarkable as visualized. No air-fluid levels.  Mastoids: Unremarkable. No mastoid effusions.  Soft Tissues: Unremarkable.           Impression:        No CT evidence of acute intracranial abnormality.     This report was finalized on 3/17/2020 4:43 PM by Dr. Mika Vasquez MD.        XR Chest 1 View [[id number]] Collected:  03/17/20 1011       Updated:  03/17/20 1156     Narrative:        EXAMINATION: XR CHEST 1 VW-      CLINICAL INDICATION:     cough, sob     TECHNIQUE:  XR CHEST 1 VW-      COMPARISON: 10/10/2015      FINDINGS:      Lungs are aerated.   Heart size, mediastinum, and pulmonary vascularity are unremarkable.   No pneumothorax.   No effusions.   No acute osseous findings.           Impression:        No radiographic evidence of acute cardiac or pulmonary  disease.     This report was finalized on 3/17/2020 10:19 AM by Dr. Mika Vasquez MD.        CT Abdomen Pelvis Stone Protocol [[id number]] Collected:  03/17/20 1150       Updated:  03/17/20 1156     Narrative:        EXAM: CT ABDOMEN PELVIS STONE PROTOCOL-            TECHNIQUE: Multiple axial CT images were obtained from lung bases  through pubic symphysis WITHOUT administration of IV contrast.  Reformatted images in the coronal and/or sagittal plane(s) were  generated from the axial data set to facilitate diagnostic accuracy  and/or surgical planning.  Oral Contrast:NONE.     Radiation dose reduction techniques were utilized per ALARA protocol.  Automated  exposure control was initiated through either or [employer] or  [employer] software packages by  protocol.       DOSE:     Clinical information  Hematuria      Comparison  Comparison: 02/10/2020     Findings  LOWER THORAX: STABLE MILD CARDIOMEGALY. CENTRILOBULAR NODULES RIGHT  LOWER LOBE COMPATIBLE WITH ATYPICAL PNEUMONIA. NO CONSOLIDATIVE  PNEUMONIA IDENTIFIED.     ABDOMEN:        LIVER: LIVER CIRRHOSIS CHANGES ARE AGAIN NOTED WITH NO FOCAL LIVER  LESION OR PERIHEPATIC ASCITES IDENTIFIED.        GALLBLADDER: CHOLECYSTECTOMY.        PANCREAS: Unremarkable. No mass or ductal dilatation.        SPLEEN: BORDERLINE ENLARGEMENT OF THE SPLEEN IS STABLE.        ADRENALS: No mass.        KIDNEYS/URETERS: NO RENAL OR URETERAL STONES OR HYDRONEPHROSIS NOTED.        GI TRACT: Non-dilated. No definite wall thickening.        PERITONEUM: No free air. No free fluid or loculated fluid  collections.        MESENTERY: Unremarkable.        LYMPH NODES: PROMINENT AND LIKELY REACTIVE MILDLY ENLARGED CIERA  HEPATIS AND UPPER ABDOMINAL LYMPH NODES.        VASCULATURE: STABLE MILD ATHEROSCLEROSIS.        ABDOMINAL WALL: No focal hernia or mass.           OTHER: None.     PELVIS:        BLADDER: URINARY BLADDER IS INCOMPLETELY DISTENDED BUT IS OTHERWISE  UNREMARKABLE IN APPEARANCE.        REPRODUCTIVE: HYSTERECTOMY.        APPENDIX: APPENDECTOMY.     BONES: DEGENERATIVE CHANGES LUMBAR SPINE BUT NO ACUTE BONY FINDINGS  IDENTIFIED.        Impression:        Impression:  1. Right lower lobe atypical pneumonia.  2. Liver cirrhosis. No significant ascites.  3. No renal or ureteral stones. No hydronephrosis.  4. Other nonacute/incidental findings as above.     This report was finalized on 3/17/2020 11:52 AM by Dr. Mika Vasquez MD.        CT Chest With Contrast [[id number]] Collected:  03/17/20 1152       Updated:  03/17/20 1156     Narrative:        EXAM: CT CHEST W CONTRAST-            CLINICAL INDICATION:cough, sob      COMPARISON:  NONE.     TECHNIQUE: Multiple axial CT images were obtained from lung apex through  upper abdomen WITH administration of IV contrast. Reformatted images in  the coronal and/or sagittal plane(s) were generated from the axial data  set to facilitate diagnostic accuracy and/or surgical planning.     Radiation dose reduction techniques were utilized per ALARA protocol.  Automated exposure control was initiated through either or [employer] or  [employer] software packages by  protocol.    DOSE (DLP mGy-cm):        FINDINGS:     LUNGS: CENTRILOBULAR NODULES OF THE RIGHT UPPER LOBE AND RIGHT LOWER  LOBE AND MINIMAL CENTRILOBULAR NODULES IN THE LEFT LOWER LOBE SUPERIOR  SEGMENT MOST CONSISTENT WITH RIGHT GREATER THAN LEFT BILATERAL ATYPICAL  PNEUMONIA. NO LOBAR CONSOLIDATION IDENTIFIED.     HEART: MILD CARDIOMEGALY IS NOTED.     PERICARDIUM: No effusion.     MEDIASTINUM: PROBABLE REACTIVE LYMPH NODES IN THE RIGHT HILUM AND  MEDIASTINUM BUT NO BULKY ADENOPATHY IDENTIFIED.     PLEURA: No pleural effusion. No pleural mass or abnormal calcification.  No pneumothorax.     MAJOR AIRWAYS: Clear. No intrinsic mass.     VASCULATURE: No evidence of aneurysm.     VISUALIZED UPPER ABDOMEN:LIVER CIRRHOSIS AND MILD SPLENOMEGALY. PRIOR  CHOLECYSTECTOMY.     OTHER: None.     BONES: DEGENERATIVE CHANGES THORACIC SPINE BUT NO ACUTE BONY FINDINGS  IDENTIFIED.        Impression:        1. Bilateral right greater than left centrilobular nodularity as  detailed above most consistent with bilateral atypical pneumonia.  2. Probable reactive borderline adenopathy of the right hilum and  mediastinum. No bulky adenopathy identified.  3. Mild cardiomegaly.  4. Liver cirrhosis. Other nonacute findings as above.     This report was finalized on 3/17/2020 11:53 AM by Dr. Mika Vasquez MD.                     Results for orders placed during the hospital encounter of 03/17/20   Adult Transthoracic Echo Complete W/ Cont if Necessary Per Protocol      Narrative · The study is technically difficult for diagnosis.  · Normal left ventricular cavity size and wall thickness noted. All left   ventricular wall segments contract normally.  · Estimated EF appears to be in the range of 56 - 60%  · The aortic valve is not well visualized. The aortic valve is grossly   normal in structure. The valve was poorly visualized but appears   trileaflet. No aortic valve regurgitation is present. No aortic valve   stenosis is present  · The mitral valve is normal in structure. Trace mitral valve   regurgitation is present. No significant mitral valve stenosis is present.  · The tricuspid valve is normal. No tricuspid valve regurgitation is   present.  · The pericardium is normal. There is no evidence of pericardial effusion.            Assessment/Plan:   Acute hypoxic respiratory failure secondary to influenza pneumonia.  Atypical screens negative, MRSA swab negative, I have stopped doxycycline, stop vancomycin, continue Rocephin, inflammatory markers improving, completing a Tamiflu course.  Clinically patient is improving.  Oxygen has been removed, will monitor her oxygen saturation overnight, hopefully if present progress continues patient could be discharged tomorrow, the need for home oxygen will be assessed at that time.  Strongly encouraged to quit smoking.     Thrombocytopenia, improving, probably related to cirrhosis as well as the overall infectious process she has had.  Liver function appears to be adequate, she has follow-up with a gastroenterologist as an outpatient for further evaluation.  Most likely this is related to [place].  Hepatitis profile was negative for work.     Cirrhosis: As above\     Elevated transaminases, slightly improved, probably related to [place].  We will leave her off Lipitor on discharge     Wheezing on admission, improved, weaning steroids     Diabetes, controlled with her home insulin     DVT prophylaxis, SCUDs, not using anticoagulants due to  thrombocytopenia

## 2020-03-27 ENCOUNTER — READMISSION MANAGEMENT (OUTPATIENT)
Dept: CALL CENTER | Facility: HOSPITAL | Age: 50
End: 2020-03-27

## 2020-03-27 NOTE — OUTREACH NOTE
COPD/PN Week 1 Survey      Responses   [facility] patient discharged from?  [facility]   Does the patient have one of the following disease processes/diagnoses(primary or secondary)?  COPD/Pneumonia   Is there a successful TCM telephone encounter documented?  No   Was the primary reason for admission:  Pneumonia   Week 1 attempt successful?  Yes   Call start time  0920   Call end time  0920   Discharge diagnosis  Acute hypoxic resp. failure d/t influenza H1N1. morbid obesity, GIBRAN, IDDM on Insulin pump. Cirrhosis Poss ARCINIEGA, Atypical Pneumonia, ongoing tobacco use, Severe sepsis POA   What is the patient's perception of their health status since discharge?  Improving   Is the patient/caregiver able to teach back the hierarchy of who to call/visit for symptoms/problems? PCP, Specialist, Home health nurse, Urgent Care, ED, 911  Yes   Additional teach back comments  Patient says she is doing well, no questions or concerns at this time.   Week 1 call completed?  Yes   Wrap up additional comments  quick call          Azucena Strong RN

## 2020-04-03 ENCOUNTER — READMISSION MANAGEMENT (OUTPATIENT)
Dept: CALL CENTER | Facility: HOSPITAL | Age: 50
End: 2020-04-03

## 2020-04-03 NOTE — OUTREACH NOTE
After Visit Summary   10/31/2017    Ricci Lawrence    MRN: [id number]           Patient Information     Date Of Birth          1973        Visit Information        Provider Department      10/31/2017 2:30 PM Rene William,  [facility]        Today's Diagnoses     Status post incision and drainage    -  1    Contact with heriaw as cause of accidental injury          Care Instructions    Thank you for allowing Dr. William and our surgical team to participate in your care. Please call with any scheduling questions or concerns to [facility] at [phone] or for nursing questions Cand[place] [phone].  We will see you back in 2 weeks for a f/u , someone will call you to get this scheduled              Follow-ups after your visit        Your next 10 appointments already scheduled     Nov 07, 2017  2:30 PM CST   (Arrive by 2:15 PM)   Post Op with Rene William DO   [facility] ([facility] [place] )    [address]  [facility] [address]   [phone]              Who to contact     If you have questions or need follow up information about today's clinic visit or your schedule please contact [facility] directly at [phone].  Normal or non-critical lab and imaging results will be communicated to you by MyChart, letter or phone within 4 business days after the clinic has received the results. If you do not hear from us within 7 days, please contact the clinic through MyChart or phone. If you have a critical or abnormal lab result, we will notify you by phone as soon as possible.  Submit refill requests through [employer] or call your pharmacy and they will forward the refill request to us. Please allow 3 business days for your refill to be completed.          Additional Information About Your Visit        MyChart Information     [employer] lets you send messages to your doctor, view your test results, renew your  COPD/PN Week 2 Survey      Responses   [facility] patient discharged from?  C[facility]   Does the patient have one of the following disease processes/diagnoses(primary or secondary)?  COPD/Pneumonia   Was the primary reason for admission:  Pneumonia   Week 2 attempt successful?  Yes   Call start time  1556   Call end time  1601   Meds reviewed with patient/caregiver?  Yes   Is the patient having any side effects they believe may be caused by any medication additions or changes?  No   Does the patient have all medications ordered at discharge?  Yes   Is the patient taking all medications as directed (includes completed medication regime)?  Yes   Does the patient have a primary care provider?   Yes   Comments regarding PCP  PATIENT STATES SHE IS STAYING IN CONTACT WITH HER PCP BY PHONE   Has the patient kept scheduled appointments due by today?  N/A   Has home health visited the patient within 72 hours of discharge?  N/A   What DME was ordered?  [employer] for home O2   Has all DME been delivered?  Yes   Did the patient receive a copy of their discharge instructions?  Yes   What is the patient's perception of their health status since discharge?  Improving   Is the patient/caregiver able to teach back the hierarchy of who to call/visit for symptoms/problems? PCP, Specialist, Home health nurse, Urgent Care, ED, 911  Yes   Is the patient able to teach back COPD zones?  Yes   Nursing interventions  Education provided on various zones   Patient reports what zone on this call?  Green Zone   Green Zone  Reports doing well, Breathing without shortness of breath, Usual activity and exercise level, Usual amount of phlegm/mucus without difficulty coughing up, Sleeping well, Appetite is good   Green Zone interventions:  Take daily medications, Use oxygen as prescribed, Avoid indoor/outdoor triggers, Continue regular exercise/diet plan   Week 2 call completed?  Yes          Babita Carrion LPN   "prescriptions, schedule appointments and more. To sign up, go to [email or web address] . Click on \"Log in\" on the left side of the screen, which will take you to the Welcome page. Then click on \"Sign up Now\" on the right side of the page.     You will be asked to enter the access code listed below, as well as some personal information. Please follow the directions to create your username and password.     Your access code is: [email or web address]  Expires: 2018  5:36 PM     Your access code will  in 90 days. If you need help or a new code, please call your [facility] clinic or [phone].        Care EveryWhere ID     This is your Care EveryWhere ID. This could be used by other organizations to access your [facility] medical records  [id number]        Your Vitals Were     Pulse Temperature Height Pulse Oximetry BMI (Body Mass Index)       76 97.4  F (36.3  C) (Tympanic) 6' 2\" (1.88 m) 94% 32.1 kg/m2        Blood Pressure from Last 3 Encounters:   10/31/17 136/74   10/21/17 120/80    Weight from Last 3 Encounters:   10/31/17 250 lb (113.4 kg)   10/20/17 243 lb 8 oz (110.5 kg)              Today, you had the following     No orders found for display         Today's Medication Changes          These changes are accurate as of: 10/31/17  2:53 PM.  If you have any questions, ask your nurse or doctor.               Stop taking these medicines if you haven't already. Please contact your care team if you have questions.     acetaminophen-codeine 300-30 MG per tablet   Commonly known as:  TYLENOL #3           cefuroxime 500 MG tablet   Commonly known as:  CEFTIN                    Primary Care Provider Office Phone # Fax #    Efrain Larson [phone]       [facility] [address] [facility] [address]        Equal Access to Services     SATYA IRVIN AH: Mac Mora, waaxda luqadaha, qaybta kaalkednra potts. So [facility] " [phone].    ATENCIÓN: Si glen arenas, tiene a winston disposición servicios gratuitos de asistencia lingüística. Jeanette al [phone].    We comply with applicable federal civil rights laws and [place] laws. We do not discriminate on the basis of race, color, national origin, age, disability, sex, sexual orientation, or gender identity.            Thank you!     Thank you for choosing [facility]  for your care. Our goal is always to provide you with excellent care. Hearing back from our patients is one way we can continue to improve our services. Please take a few minutes to complete the written survey that you may receive in the mail after your visit with us. Thank you!             Your Updated Medication List - Protect others around you: Learn how to safely use, store and throw away your medicines at www.disposemymeds.org.          This list is accurate as of: 10/31/17  2:53 PM.  Always use your most recent med list.                   Brand Name Dispense Instructions for use Diagnosis    FISH OIL OMEGA-3 PO      Take 2 tablets by mouth daily        IBUPROFEN PO      Take by mouth every 6 hours as needed for moderate pain        Multi-vitamin Tabs tablet      Take 1 tablet by mouth daily        SIMVASTATIN PO      Take 80 mg by mouth daily        TYLENOL PO      Take by mouth every 6 hours as needed for mild pain or fever        VITAMIN D (CHOLECALCIFEROL) PO      Take 2,000 Units by mouth 2 times daily

## 2020-04-08 ENCOUNTER — READMISSION MANAGEMENT (OUTPATIENT)
Dept: CALL CENTER | Facility: HOSPITAL | Age: 50
End: 2020-04-08

## 2020-04-08 NOTE — OUTREACH NOTE
COPD/PN Week 3 Survey      Responses   [facility] patient discharged from?  Cb   COVID-19 Test Status  Not tested   Does the patient have one of the following disease processes/diagnoses(primary or secondary)?  COPD/Pneumonia   Was the primary reason for admission:  Pneumonia   Call end time  1454   Meds reviewed with patient/caregiver?  Yes   Is the patient taking all medications as directed (includes completed medication regime)?  Yes   Comments regarding PCP  Appt with PCP 4/8/20   Has the patient kept scheduled appointments due by today?  Yes   Psychosocial issues?  No   What is the patient's perception of their health status since discharge?  Same [Pt reports she's not getting better. She has a headache, cough, and fever. ]   Nursing Interventions  Nurse provided patient education   Are the patient's immunizations up to date?   Yes   Is the patient/caregiver able to teach back signs and symptoms of worsening condition:  Fever/chills, Shortness of breath, Chest pain [Pt is having fever, shortness of breath, and chest pain. She will speak with PCP today. ]   Is the patient/caregiver able to teach back importance of completing antibiotic course of treatment?  Yes   Week 3 call completed?  Yes          Saba Mancia RN

## 2020-04-16 ENCOUNTER — READMISSION MANAGEMENT (OUTPATIENT)
Dept: CALL CENTER | Facility: HOSPITAL | Age: 50
End: 2020-04-16

## 2020-04-16 NOTE — OUTREACH NOTE
COPD/PN Week 4 Survey      Responses   [facility] patient discharged from?  Cb   COVID-19 Test Status  Not tested   Does the patient have one of the following disease processes/diagnoses(primary or secondary)?  COPD/Pneumonia   Was the primary reason for admission:  Pneumonia   Week 4 attempt successful?  Yes   Call start time  1353   Call end time  1407   Meds reviewed with patient/caregiver?  Yes   Is the patient taking all medications as directed (includes completed medication regime)?  Yes   Has the patient kept scheduled appointments due by today?  Yes   Is the patient still receiving Home Health Services?  N/A   What is the patient's perception of their health status since discharge?  Same   Is the patient able to teach back COPD zones?  Yes   Week 4 call completed?  Yes   Would the patient like one additional call?  Yes   Wrap up additional comments  Patient does not feel any better.  Put in a for another call next week.          Ivanna Luevano, RN

## 2020-04-23 ENCOUNTER — READMISSION MANAGEMENT (OUTPATIENT)
Dept: CALL CENTER | Facility: HOSPITAL | Age: 50
End: 2020-04-23

## 2020-04-23 NOTE — OUTREACH NOTE
COPD/PN Week 5 Survey      Responses   [facility]   COVID-19 Test Status  Not tested   Does the patient have one of the following disease processes/diagnoses(primary or secondary)?  COPD/Pneumonia   Was the primary reason for admission:  Pneumonia   Week 5 attempt successful?  No          Daksha Langford RN

## 2020-04-27 ENCOUNTER — LAB (OUTPATIENT)
Dept: LAB | Facility: HOSPITAL | Age: 50
End: 2020-04-27

## 2020-04-27 ENCOUNTER — HOSPITAL ENCOUNTER (EMERGENCY)
Facility: HOSPITAL | Age: 50
Discharge: HOME | End: 2020-04-27
Attending: FAMILY MEDICINE | Admitting: FAMILY MEDICINE

## 2020-04-27 ENCOUNTER — TELEPHONE (OUTPATIENT)
Dept: PULMONOLOGY | Facility: CLINIC | Age: 50
End: 2020-04-27

## 2020-04-27 ENCOUNTER — APPOINTMENT (OUTPATIENT)
Dept: GENERAL RADIOLOGY | Facility: HOSPITAL | Age: 50
End: 2020-04-27

## 2020-04-27 VITALS
TEMPERATURE: 98.7 F | OXYGEN SATURATION: 97 % | DIASTOLIC BLOOD PRESSURE: 75 MMHG | HEIGHT: 63 IN | SYSTOLIC BLOOD PRESSURE: 128 MMHG | RESPIRATION RATE: 18 BRPM | HEART RATE: 85 BPM | WEIGHT: 293 LBS | BODY MASS INDEX: 51.91 KG/M2

## 2020-04-27 DIAGNOSIS — R06.02 SOB (SHORTNESS OF BREATH): Primary | ICD-10-CM

## 2020-04-27 DIAGNOSIS — Z20.822 EXPOSURE TO COVID-19 VIRUS: Primary | ICD-10-CM

## 2020-04-27 DIAGNOSIS — Z20.822 EXPOSURE TO COVID-19 VIRUS: ICD-10-CM

## 2020-04-27 LAB
ALBUMIN SERPL-MCNC: 3.15 G/DL (ref 3.5–5.2)
ALBUMIN/GLOB SERPL: 0.9 G/DL
ALP SERPL-CCNC: 102 U/L (ref 39–117)
ALT SERPL W P-5'-P-CCNC: 17 U/L (ref 1–33)
ANION GAP SERPL CALCULATED.3IONS-SCNC: 12.1 MMOL/L (ref 5–15)
AST SERPL-CCNC: 33 U/L (ref 1–32)
BASOPHILS # BLD AUTO: 0.03 10*3/MM3 (ref 0–0.2)
BASOPHILS NFR BLD AUTO: 0.5 % (ref 0–1.5)
BILIRUB SERPL-MCNC: 0.3 MG/DL (ref 0.2–1.2)
BUN BLD-MCNC: 6 MG/DL (ref 6–20)
BUN/CREAT SERPL: 8.2 (ref 7–25)
CALCIUM SPEC-SCNC: 8.5 MG/DL (ref 8.6–10.5)
CHLORIDE SERPL-SCNC: 100 MMOL/L (ref 98–107)
CO2 SERPL-SCNC: 23.9 MMOL/L (ref 22–29)
CREAT BLD-MCNC: 0.73 MG/DL (ref 0.57–1)
CRP SERPL-MCNC: 0.22 MG/DL (ref 0–0.5)
DEPRECATED RDW RBC AUTO: 50.1 FL (ref 37–54)
EOSINOPHIL # BLD AUTO: 0.08 10*3/MM3 (ref 0–0.4)
EOSINOPHIL NFR BLD AUTO: 1.4 % (ref 0.3–6.2)
ERYTHROCYTE [DISTWIDTH] IN BLOOD BY AUTOMATED COUNT: 13.5 % (ref 12.3–15.4)
FERRITIN SERPL-MCNC: 60.03 NG/ML (ref 13–150)
GFR SERPL CREATININE-BSD FRML MDRD: 85 ML/MIN/1.73
GLOBULIN UR ELPH-MCNC: 3.6 GM/DL
GLUCOSE BLD-MCNC: 356 MG/DL (ref 65–99)
HCT VFR BLD AUTO: 41 % (ref 34–46.6)
HGB BLD-MCNC: 13.2 G/DL (ref 12–15.9)
HOLD SPECIMEN: NORMAL
HOLD SPECIMEN: NORMAL
IMM GRANULOCYTES # BLD AUTO: 0.01 10*3/MM3 (ref 0–0.05)
IMM GRANULOCYTES NFR BLD AUTO: 0.2 % (ref 0–0.5)
LYMPHOCYTES # BLD AUTO: 2.37 10*3/MM3 (ref 0.7–3.1)
LYMPHOCYTES NFR BLD AUTO: 40.7 % (ref 19.6–45.3)
MCH RBC QN AUTO: 32 PG (ref 26.6–33)
MCHC RBC AUTO-ENTMCNC: 32.2 G/DL (ref 31.5–35.7)
MCV RBC AUTO: 99.3 FL (ref 79–97)
MONOCYTES # BLD AUTO: 0.6 10*3/MM3 (ref 0.1–0.9)
MONOCYTES NFR BLD AUTO: 10.3 % (ref 5–12)
NEUTROPHILS # BLD AUTO: 2.73 10*3/MM3 (ref 1.7–7)
NEUTROPHILS NFR BLD AUTO: 46.9 % (ref 42.7–76)
NRBC BLD AUTO-RTO: 0 /100 WBC (ref 0–0.2)
PLATELET # BLD AUTO: 105 10*3/MM3 (ref 140–450)
PMV BLD AUTO: 11.7 FL (ref 6–12)
POTASSIUM BLD-SCNC: 3.8 MMOL/L (ref 3.5–5.2)
PROT SERPL-MCNC: 6.7 G/DL (ref 6–8.5)
RBC # BLD AUTO: 4.13 10*6/MM3 (ref 3.77–5.28)
SODIUM BLD-SCNC: 136 MMOL/L (ref 136–145)
WBC NRBC COR # BLD: 5.82 10*3/MM3 (ref 3.4–10.8)
WHOLE BLOOD HOLD SPECIMEN: NORMAL
WHOLE BLOOD HOLD SPECIMEN: NORMAL

## 2020-04-27 PROCEDURE — 82728 ASSAY OF FERRITIN: CPT | Performed by: NURSE PRACTITIONER

## 2020-04-27 PROCEDURE — 80053 COMPREHEN METABOLIC PANEL: CPT | Performed by: NURSE PRACTITIONER

## 2020-04-27 PROCEDURE — 85025 COMPLETE CBC W/AUTO DIFF WBC: CPT | Performed by: NURSE PRACTITIONER

## 2020-04-27 PROCEDURE — U0003 INFECTIOUS AGENT DETECTION BY NUCLEIC ACID (DNA OR RNA); SEVERE ACUTE RESPIRATORY SYNDROME CORONAVIRUS 2 (SARS-COV-2) (CORONAVIRUS DISEASE [COVID-19]), AMPLIFIED PROBE TECHNIQUE, MAKING USE OF HIGH THROUGHPUT TECHNOLOGIES AS DESCRIBED BY CMS-2020-01-R: HCPCS

## 2020-04-27 PROCEDURE — 93010 ELECTROCARDIOGRAM REPORT: CPT | Performed by: SPECIALIST

## 2020-04-27 PROCEDURE — 86140 C-REACTIVE PROTEIN: CPT | Performed by: NURSE PRACTITIONER

## 2020-04-27 PROCEDURE — 93005 ELECTROCARDIOGRAM TRACING: CPT | Performed by: FAMILY MEDICINE

## 2020-04-27 PROCEDURE — 71045 X-RAY EXAM CHEST 1 VIEW: CPT

## 2020-04-27 PROCEDURE — 99284 EMERGENCY DEPT VISIT MOD MDM: CPT

## 2020-04-27 PROCEDURE — 71045 X-RAY EXAM CHEST 1 VIEW: CPT | Performed by: RADIOLOGY

## 2020-04-27 PROCEDURE — 84145 PROCALCITONIN (PCT): CPT | Performed by: NURSE PRACTITIONER

## 2020-04-27 RX ORDER — SODIUM CHLORIDE 0.9 % (FLUSH) 0.9 %
10 SYRINGE (ML) INJECTION AS NEEDED
Status: DISCONTINUED | OUTPATIENT
Start: 2020-04-27 | End: 2020-04-27 | Stop reason: HOSPADM

## 2020-04-27 RX ADMIN — SODIUM CHLORIDE 1000 ML: 9 INJECTION, SOLUTION INTRAVENOUS at 18:03

## 2020-04-27 NOTE — TELEPHONE ENCOUNTER
CALLED PATIENT TO CONFIRM APPOINTMENT AND SHE HAD THREE POSITIVE FACTORS PER OUR ALGORITHM. COUGH, HEADACHE, NOT ABLE TO COMPLETES DAILY TASK. INSTRUCTED PATIENT TO KEEP Wednesdays video/telephone visit. Also put an order in for patient to go to our drive thru testing facility. Instructed patient to wear a mask and register when she arrived over the phone. ([phone])     Patient voiced understanding.

## 2020-04-28 LAB — PROCALCITONIN SERPL-MCNC: 0.06 NG/ML (ref 0.1–0.25)

## 2020-04-30 ENCOUNTER — OFFICE VISIT (OUTPATIENT)
Dept: PULMONOLOGY | Facility: CLINIC | Age: 50
End: 2020-04-30

## 2020-04-30 ENCOUNTER — EPISODE CHANGES (OUTPATIENT)
Dept: CASE MANAGEMENT | Facility: OTHER | Age: 50
End: 2020-04-30

## 2020-04-30 ENCOUNTER — PATIENT OUTREACH (OUTPATIENT)
Dept: CASE MANAGEMENT | Facility: OTHER | Age: 50
End: 2020-04-30

## 2020-04-30 DIAGNOSIS — R06.02 SHORTNESS OF BREATH: Primary | ICD-10-CM

## 2020-04-30 PROCEDURE — 99443 PR PHYS/QHP TELEPHONE EVALUATION 21-30 MIN: CPT | Performed by: NURSE PRACTITIONER

## 2020-04-30 NOTE — OUTREACH NOTE
ED Potential Covid Discharge Follow-up    Patient was seen in the ED 4/27/20 and was tested for Covid-19; results still pending. Patient reports she is still feeling poorly with shortness of air, cough, and fatigue. Pt noted to cough multiple times during call. ACM discussed self-quarantine measures with patient, understanding voiced. Patient confirms she is staying isolated while awaiting test results. Pt has a telephone visit scheduled with pulmonology today at 2:45, pt voices understanding of this. ACM advised pt of 24/7 nurse line number on the AVS, pt v/u.     Analilia Oneal RN  Ambulatory     4/30/2020, 14:09

## 2020-05-01 LAB — SARS-COV-2 RNA RESP QL NAA+PROBE: NOT DETECTED

## 2020-05-01 RX ORDER — PREDNISONE 10 MG/1
TABLET ORAL
Qty: 42 TABLET | Refills: 0 | Status: SHIPPED | OUTPATIENT
Start: 2020-05-01 | End: 2020-09-02

## 2020-05-27 ENCOUNTER — TRANSCRIBE ORDERS (OUTPATIENT)
Dept: PULMONOLOGY | Facility: CLINIC | Age: 50
End: 2020-05-27

## 2020-05-27 DIAGNOSIS — Z11.59 SPECIAL SCREENING EXAMINATION FOR UNSPECIFIED VIRAL DISEASE: Primary | ICD-10-CM

## 2020-05-28 ENCOUNTER — LAB (OUTPATIENT)
Dept: LAB | Facility: HOSPITAL | Age: 50
End: 2020-05-28

## 2020-05-28 ENCOUNTER — OFFICE VISIT (OUTPATIENT)
Dept: PULMONOLOGY | Facility: CLINIC | Age: 50
End: 2020-05-28

## 2020-05-28 VITALS
OXYGEN SATURATION: 92 % | HEIGHT: 63 IN | BODY MASS INDEX: 51.91 KG/M2 | HEART RATE: 101 BPM | SYSTOLIC BLOOD PRESSURE: 130 MMHG | TEMPERATURE: 98.7 F | DIASTOLIC BLOOD PRESSURE: 62 MMHG | WEIGHT: 293 LBS

## 2020-05-28 DIAGNOSIS — R06.02 SHORTNESS OF BREATH: Primary | ICD-10-CM

## 2020-05-28 DIAGNOSIS — G47.33 OSA (OBSTRUCTIVE SLEEP APNEA): ICD-10-CM

## 2020-05-28 DIAGNOSIS — E66.9 OBESITY (BMI 30-39.9): ICD-10-CM

## 2020-05-28 DIAGNOSIS — M79.89 SWELLING OF LOWER EXTREMITY: ICD-10-CM

## 2020-05-28 DIAGNOSIS — Z11.59 SPECIAL SCREENING EXAMINATION FOR UNSPECIFIED VIRAL DISEASE: ICD-10-CM

## 2020-05-28 DIAGNOSIS — F17.210 CIGARETTE NICOTINE DEPENDENCE WITHOUT COMPLICATION: ICD-10-CM

## 2020-05-28 PROCEDURE — U0002 COVID-19 LAB TEST NON-CDC: HCPCS

## 2020-05-28 PROCEDURE — U0004 COV-19 TEST NON-CDC HGH THRU: HCPCS

## 2020-05-28 PROCEDURE — 99214 OFFICE O/P EST MOD 30 MIN: CPT | Performed by: NURSE PRACTITIONER

## 2020-05-28 PROCEDURE — C9803 HOPD COVID-19 SPEC COLLECT: HCPCS

## 2020-05-28 RX ORDER — BUDESONIDE AND FORMOTEROL FUMARATE DIHYDRATE 160; 4.5 UG/1; UG/1
2 AEROSOL RESPIRATORY (INHALATION) 2 TIMES DAILY
Qty: 1 INHALER | Refills: 11 | Status: SHIPPED | OUTPATIENT
Start: 2020-05-28 | End: 2021-05-20 | Stop reason: SDUPTHER

## 2020-05-29 LAB
REF LAB TEST METHOD: NORMAL
SARS-COV-2 RNA RESP QL NAA+PROBE: NOT DETECTED

## 2020-06-01 ENCOUNTER — APPOINTMENT (OUTPATIENT)
Dept: RESPIRATORY THERAPY | Facility: HOSPITAL | Age: 50
End: 2020-06-01

## 2020-06-01 PROBLEM — F17.210 CIGARETTE NICOTINE DEPENDENCE WITHOUT COMPLICATION: Status: ACTIVE | Noted: 2020-06-01

## 2020-06-16 ENCOUNTER — TRANSCRIBE ORDERS (OUTPATIENT)
Dept: ADMINISTRATIVE | Facility: HOSPITAL | Age: 50
End: 2020-06-16

## 2020-06-16 ENCOUNTER — LAB (OUTPATIENT)
Dept: LAB | Facility: HOSPITAL | Age: 50
End: 2020-06-16

## 2020-06-16 DIAGNOSIS — Z11.59 ENCOUNTER FOR SCREENING FOR OTHER VIRAL DISEASES: Primary | ICD-10-CM

## 2020-06-16 DIAGNOSIS — Z11.59 ENCOUNTER FOR SCREENING FOR OTHER VIRAL DISEASES: ICD-10-CM

## 2020-06-16 PROCEDURE — U0004 COV-19 TEST NON-CDC HGH THRU: HCPCS

## 2020-06-16 PROCEDURE — C9803 HOPD COVID-19 SPEC COLLECT: HCPCS

## 2020-06-16 PROCEDURE — U0002 COVID-19 LAB TEST NON-CDC: HCPCS

## 2020-06-17 LAB
REF LAB TEST METHOD: NORMAL
SARS-COV-2 RNA RESP QL NAA+PROBE: NOT DETECTED

## 2020-06-18 ENCOUNTER — HOSPITAL ENCOUNTER (OUTPATIENT)
Dept: CARDIOLOGY | Facility: HOSPITAL | Age: 50
Discharge: HOME | End: 2020-06-18

## 2020-06-18 ENCOUNTER — HOSPITAL ENCOUNTER (OUTPATIENT)
Dept: RESPIRATORY THERAPY | Facility: HOSPITAL | Age: 50
Discharge: HOME | End: 2020-06-18
Admitting: NURSE PRACTITIONER

## 2020-06-18 DIAGNOSIS — R06.02 SHORTNESS OF BREATH: ICD-10-CM

## 2020-06-18 DIAGNOSIS — M79.89 SWELLING OF LOWER EXTREMITY: ICD-10-CM

## 2020-06-18 PROCEDURE — 93970 EXTREMITY STUDY: CPT | Performed by: RADIOLOGY

## 2020-06-18 PROCEDURE — 93970 EXTREMITY STUDY: CPT

## 2020-06-18 PROCEDURE — 94060 EVALUATION OF WHEEZING: CPT

## 2020-06-18 PROCEDURE — 94727 GAS DIL/WSHOT DETER LNG VOL: CPT

## 2020-06-18 PROCEDURE — 94727 GAS DIL/WSHOT DETER LNG VOL: CPT | Performed by: INTERNAL MEDICINE

## 2020-06-18 PROCEDURE — 94729 DIFFUSING CAPACITY: CPT | Performed by: INTERNAL MEDICINE

## 2020-06-18 PROCEDURE — 94729 DIFFUSING CAPACITY: CPT

## 2020-06-18 PROCEDURE — 94060 EVALUATION OF WHEEZING: CPT | Performed by: INTERNAL MEDICINE

## 2020-06-18 PROCEDURE — 94640 AIRWAY INHALATION TREATMENT: CPT

## 2020-06-18 RX ORDER — ALBUTEROL SULFATE 2.5 MG/3ML
2.5 SOLUTION RESPIRATORY (INHALATION) ONCE
Status: COMPLETED | OUTPATIENT
Start: 2020-06-18 | End: 2020-06-18

## 2020-06-18 RX ADMIN — ALBUTEROL SULFATE 2.5 MG: 2.5 SOLUTION RESPIRATORY (INHALATION) at 15:22

## 2020-06-19 ENCOUNTER — TELEPHONE (OUTPATIENT)
Dept: PULMONOLOGY | Facility: CLINIC | Age: 50
End: 2020-06-19

## 2020-06-19 DIAGNOSIS — I87.2 VENOUS INSUFFICIENCY: Primary | ICD-10-CM

## 2020-06-19 NOTE — PROGRESS NOTES
Lower extremity doppler was reviewed. Venous insufficieny is noted.  Will refer her to interventional cardiology for further evaluation.

## 2020-06-19 NOTE — TELEPHONE ENCOUNTER
----- Message from SOYF Rangel sent at 6/19/2020  1:48 PM EDT -----  Will you let her know that her doppler shows some venous insufficieny.  I am sending her to see Dr. Park with interventional cardiology.      ----- Message -----  From: Andry Luciano MD  Sent: 6/18/2020   3:56 PM EDT  To: SOFY Rangel    Arrange outpatient follow-up with interventional cardiology Dr. Park.  He does follow venous insufficiency patients.  Thank you  ----- Message -----  From: Adelaida Pugh APRN  Sent: 6/18/2020   3:44 PM EDT  To: Andry Luciano MD    What do I need to do with this?    ----- Message -----  From: Iker Rad Results [place] In  Sent: 6/18/2020   3:19 PM EDT  To: SOFY Rangel

## 2020-06-23 ENCOUNTER — TELEPHONE (OUTPATIENT)
Dept: PULMONOLOGY | Facility: CLINIC | Age: 50
End: 2020-06-23

## 2020-06-23 DIAGNOSIS — J98.4 RESTRICTIVE LUNG DISEASE: Primary | ICD-10-CM

## 2020-06-23 DIAGNOSIS — J18.9 PNEUMONIA DUE TO ORGANISM: ICD-10-CM

## 2020-06-23 NOTE — TELEPHONE ENCOUNTER
Notified the patient of her PFT results.  PFt shows no obstruction and no bronchodilator response.  A moderately severe restrictive ventilatory defect was noted as well as air trapping.  We will continue with her current inhalers.  We will also order a CT chest without contrast to assess for alert solution of pneumonia that was previously noted in March 2020 as well as to evaluate lung parenchyma due to restrictive lung disease.

## 2020-07-07 ENCOUNTER — HOSPITAL ENCOUNTER (OUTPATIENT)
Dept: CT IMAGING | Facility: HOSPITAL | Age: 50
Discharge: HOME | End: 2020-07-07
Admitting: NURSE PRACTITIONER

## 2020-07-07 ENCOUNTER — OFFICE VISIT (OUTPATIENT)
Dept: CARDIOLOGY | Facility: CLINIC | Age: 50
End: 2020-07-07

## 2020-07-07 VITALS
DIASTOLIC BLOOD PRESSURE: 75 MMHG | SYSTOLIC BLOOD PRESSURE: 164 MMHG | HEIGHT: 63 IN | OXYGEN SATURATION: 94 % | WEIGHT: 293 LBS | HEART RATE: 82 BPM | BODY MASS INDEX: 51.91 KG/M2

## 2020-07-07 DIAGNOSIS — E11.65 TYPE 2 DIABETES MELLITUS WITH HYPERGLYCEMIA, WITH LONG-TERM CURRENT USE OF INSULIN (HCC): ICD-10-CM

## 2020-07-07 DIAGNOSIS — M79.89 SWELLING OF LOWER EXTREMITY: Primary | ICD-10-CM

## 2020-07-07 DIAGNOSIS — I87.2 VENOUS INSUFFICIENCY OF BOTH LOWER EXTREMITIES: ICD-10-CM

## 2020-07-07 DIAGNOSIS — M79.605 PAIN IN BOTH LOWER EXTREMITIES: ICD-10-CM

## 2020-07-07 DIAGNOSIS — J18.9 PNEUMONIA DUE TO ORGANISM: ICD-10-CM

## 2020-07-07 DIAGNOSIS — M79.604 PAIN IN BOTH LOWER EXTREMITIES: ICD-10-CM

## 2020-07-07 DIAGNOSIS — J98.4 RESTRICTIVE LUNG DISEASE: ICD-10-CM

## 2020-07-07 DIAGNOSIS — Z79.4 TYPE 2 DIABETES MELLITUS WITH HYPERGLYCEMIA, WITH LONG-TERM CURRENT USE OF INSULIN (HCC): ICD-10-CM

## 2020-07-07 DIAGNOSIS — I87.303 VENOUS HYPERTENSION OF BOTH LOWER EXTREMITIES: ICD-10-CM

## 2020-07-07 PROCEDURE — 71250 CT THORAX DX C-: CPT | Performed by: RADIOLOGY

## 2020-07-07 PROCEDURE — 99204 OFFICE O/P NEW MOD 45 MIN: CPT | Performed by: INTERNAL MEDICINE

## 2020-07-07 PROCEDURE — 71250 CT THORAX DX C-: CPT

## 2020-07-07 RX ORDER — HYDROCHLOROTHIAZIDE 25 MG/1
TABLET ORAL
COMMUNITY
Start: 2020-07-06 | End: 2021-08-20

## 2020-07-07 RX ORDER — POTASSIUM CHLORIDE 750 MG/1
10 TABLET, FILM COATED, EXTENDED RELEASE ORAL 2 TIMES DAILY
COMMUNITY
End: 2021-08-20

## 2020-07-07 RX ORDER — MAGNESIUM CARB/ALUMINUM HYDROX 105-160MG
TABLET,CHEWABLE ORAL ONCE
COMMUNITY

## 2020-07-07 RX ORDER — FUROSEMIDE 40 MG/1
40 TABLET ORAL 2 TIMES DAILY
COMMUNITY
End: 2021-08-20

## 2020-07-07 RX ORDER — MULTIVIT WITH MINERALS/LUTEIN
1000 TABLET ORAL DAILY
COMMUNITY

## 2020-07-07 RX ORDER — ATORVASTATIN CALCIUM 40 MG/1
TABLET, FILM COATED ORAL
COMMUNITY
Start: 2020-07-02 | End: 2020-09-02

## 2020-07-07 NOTE — PROGRESS NOTES
"      [facility] CARDIOLOGY  [address] [facility] [address]  [place] [address]  Phone: [phone]  Fax: [phone]    07/07/2020    Chief Complaint   Patient presents with   • Leg Swelling   • Claudication        History:   Tiki Campos is a 50 y.o. female seen in consultation, referred by SOFY Rangel  for venous insuffiency. She presents today with complaints of leg edema and claudication. Venous reflux study was positive for superficial saphenous vein with reflux demonstrated in the proximal and mid thigh as well as in the distal calf veins. Hurts to walk. Arms and hands are starting to swell also per patient. When she moves her foot it feels that it is going to \"tear\" from the edema. She had a procedure at [facility] [place]. She is not sure what procedure she had.   She has intermittent episodes of dyspnea on exertion despite using O2 at 2L    She has a past medical history significant for essential hypertension, chronic tobacco abuse, dyslipidemia and insulin dependent type 2 diabetes.   The chart and medications were reviewed today. Problems above addressed this visit.      Past Medical History:   Diagnosis Date   • Anxiety    • Depression    • Diabetes mellitus (CMS/HCC)    • Hyperlipidemia    • Hypertension        Past Surgical History:   Procedure Laterality Date   • APPENDECTOMY     • CHOLECYSTECTOMY     • HYSTERECTOMY     • TONSILLECTOMY          Review of Systems:  Please see HPI  Constitution: No chills, no rigors, no unexplained weight loss or weight gain  Eyes:  No diplopia, no blurred vision, no loss of vision, conjunctiva is pink and sclera is anicteric  ENT:  No tinnitus, no otorrhea, no epistaxis, no sore throat   Respiratory: No cough, no hemoptysis  Cardiovascular: see HPI  Gastrointestinal: No nausea, no vomiting, no hematemesis, no diarrhea or constipation, no melena  Genitourinary: No frequency of dysuria no hematuria  Integument: No " pruritis and  no skin rash  Hematologic / Lymphatic: No excessive bleeding, easy bruising, fatigue, lymphadenopathy and petechiae  Musculoskeletal: No joint pain, joint stiffness, joint swelling, muscle pain, muscle weakness and neck pain  Neurological: No dizziness, headaches, light headedness, seizures and vertigo  Endocrine: No frequent urination and nocturia, temperature intolerance, weight gain, unintended and weight loss, unintended        Past Social History:  Social History     Socioeconomic History   • Marital status:      Spouse name: Not on file   • Number of children: Not on file   • Years of education: Not on file   • Highest education level: Not on file   Tobacco Use   • Smoking status: Current Every Day Smoker     Packs/day: 0.50   • Smokeless tobacco: Never Used   Substance and Sexual Activity   • Alcohol use: Never     Frequency: Never   • Drug use: Never   • Sexual activity: Defer       Past Family History:  History reviewed. No pertinent family history.    Current Outpatient Medications   Medication Sig Dispense Refill   • albuterol sulfate  (90 Base) MCG/ACT inhaler Inhale 2 puffs Every 4 (Four) Hours As Needed for Wheezing. 1 inhaler 1   • atorvastatin (LIPITOR) 40 MG tablet      • budesonide-formoterol (SYMBICORT) 160-4.5 MCG/ACT inhaler Inhale 2 puffs 2 (Two) Times a Day. 1 inhaler 11   • clonazePAM (KlonoPIN) 0.5 MG tablet Take 0.5 mg by mouth 3 (Three) Times a Day As Needed for Anxiety.     • DULoxetine (CYMBALTA) 60 MG capsule Take 60 mg by mouth 2 (Two) Times a Day.     • estrogens, conjugated, (PREMARIN) 0.625 MG tablet Take 0.625 mg by mouth Daily.     • furosemide (LASIX) 40 MG tablet Take 40 mg by mouth 2 (Two) Times a Day.     • insulin regular (HumuLIN R) 500 UNIT/ML patient supplied pump Inject  under the skin into the appropriate area as directed Continuous. Prior to [facility] Admission, Patient was on: basal 2.5u per hour     • lactobacillus acidophilus (RISAQUAD)  capsule capsule Take 1 capsule by mouth Daily.     • magnesium citrate 1.745 GM/30ML solution solution Take  by mouth 1 (One) Time.     • metoprolol succinate XL (TOPROL-XL) 50 MG 24 hr tablet Take 50 mg by mouth 2 (Two) Times a Day.     • multivitamin (DAILY TERRI) tablet tablet Take 1 tablet by mouth Every Night.     • pantoprazole (PROTONIX) 40 MG EC tablet Take 40 mg by mouth 2 (Two) Times a Day.     • potassium chloride (K-DUR) 10 MEQ CR tablet Take 10 mEq by mouth 2 (Two) Times a Day.     • pregabalin (LYRICA) 150 MG capsule Take 150 mg by mouth 2 (Two) Times a Day. Prior to [facility] Admission, Patient was on: pt states that she is supposed to be on 200 mg 3 times daily, I called and got a list from pharmacy and the only one they had filled for her was 150 mg 2 times daily.     • QUEtiapine (SEROquel) 200 MG tablet Take 200 mg by mouth Every Night.     • ranolazine (RANEXA) 500 MG 12 hr tablet Take 500 mg by mouth 2 (Two) Times a Day.     • vitamin E 1000 UNIT capsule Take 1,000 Units by mouth Daily.     • hydroCHLOROthiazide (HYDRODIURIL) 25 MG tablet      • predniSONE (DELTASONE) 10 MG tablet 6 daily x 2 days, 5 daily x 2 days, 4 daily x 2 days, 3 daily x 2 days, 2 daily x 2 days, 1 daily x 2 days 42 tablet 0     No current facility-administered medications for this visit.         Allergies   Allergen Reactions   • Morphine Hives       Objective:  Vitals:    07/07/20 1426   BP: 164/75   Pulse: 82   SpO2: 94%         Comfortable NAD  PERRL, conjunctiva clear  Neck supple, no JVD or thyromegaly appreciated  S1/S2 RRR, no m/r/g  Lungs CTA B, normal effort  Abdomen S/NT/ND (+) BS, no HSM appreciated  Extremities warm, no clubbing, cyanosis, or edema  Normal gait  No visible or palpable skin lesions  A/Ox4, mood and affect appropriate  Pulse exam:   Feet are warm bilateral  1+ edema bilateral  Capillary refill is normal  No evidence of ulceration or color change of the toes  PULSES  Right DP and PT are 1+ and Left  DP and PT are 2+    DATA:      Results for orders placed during the hospital encounter of 03/17/20   Adult Transthoracic Echo Complete W/ Cont if Necessary Per Protocol    Narrative · The study is technically difficult for diagnosis.  · Normal left ventricular cavity size and wall thickness noted. All left   ventricular wall segments contract normally.  · Estimated EF appears to be in the range of 56 - 60%  · The aortic valve is not well visualized. The aortic valve is grossly   normal in structure. The valve was poorly visualized but appears   trileaflet. No aortic valve regurgitation is present. No aortic valve   stenosis is present  · The mitral valve is normal in structure. Trace mitral valve   regurgitation is present. No significant mitral valve stenosis is present.  · The tricuspid valve is normal. No tricuspid valve regurgitation is   present.  · The pericardium is normal. There is no evidence of pericardial effusion.               Results for orders placed during the hospital encounter of 06/18/20   US Venous Doppler Lower Extremity Bilat Duplex for Insufficiency    Narrative US DUPLEX VENOUS DOPPLER BILAT FOR INSUFFICIENCY-     REASON FOR EXAM:  lower extremity swelling; M79.89-Other specified soft  tissue disorders     Multiple real-time images were obtained. The deep veins were well  demonstrated sonographically. There is good color doppler signal seen  filling the deep veins. They were completely compressed by the  ultrasound transducer. There was good spontaneous venous flow and  augmentation. There are no echoes seen along the deep veins to suggest  clot. Scans were also obtained of the superficial saphenous vein with  compression to evaluate for reflux. The images show no valvular  incompetency in the right lower extremity. In the left leg, there was  reflux noted in the greater saphenous vein in the proximal and mid thigh  regions. There is also some valvular incompetency in the calf veins  allowing  reflux in the saphenous vein in the distal calf.       Impression No sonographic findings of DVT. There is evidence of  valvular incompetence seen in the superficial saphenous vein with reflux  demonstrated in the proximal and mid thigh as well as in the distal calf  veins.     This report was finalized on 6/18/2020 3:17 PM by Dr. Demario Hernandez II, MD.          Procedures     Assessment  BLE pain described as severe. Appears to be from nerve pain.  She has an abnormal reflux study. Evidence of superficial saphenous vein with reflux in the proximal and mid thigh as well as distal calf.  Essential hypertension elevated  Morbid obesity with BMI 58.6  Insulin dependent type 2 diabetes.   Chronic tobacco abuse    Plan  We will proceed with a Venoseal procedure left lower extremtiy and follow up in 3 months    I extensively discussed consequences of smoking namely cardiovascular/metabolic, lung cancer, mouth cancer, pulmonary disorder including COPD and reduced quality of life.  At the end of the conversation, patient voices understanding of the risk involved in smoking.  Patient also understands the benefits of quitting.  I provided the patient smoking cessation material. Patient displayed understanding and stated that he will try to quit smoking.  During this visit I spent 3-5 minutes counseling the patient regarding the smoking cessation.        Patient's Body mass index is 58.63 kg/m². BMI is above normal parameters. Recommendations include: educational material.         ICD-10-CM ICD-9-CM   1. Swelling of lower extremity M79.89 729.81   2. Venous hypertension of both lower extremities I87.303 459.30   3. Venous insufficiency of both lower extremities I87.2 459.81   4. Pain in both lower extremities M79.604 729.5    M79.605    5. Type 2 diabetes mellitus with hyperglycemia, with long-term current use of insulin (CMS/[facility]) E11.65 250.00    Z79.4 790.29     V58.67        Thank you for allowing me to participate in the  care of Tikiandrea Campos. Feel free to contact me directly with any further questions or concerns.        Director, Cardiac Cath Lab    ,BRANDON Appiah, acting as scribe for Miah Park MD, [facility], [facility].   07/07/20  15:16    I have read and agree the documentation that has been completed regarding this visit.  By signing this record, I attest that the documentation was completed by my physical presence and is an accurate record of the encounter.  Voice recognition / transcription technology used for some documentation in this chart in attempt to mitigate substantial inefficiencies created by this electronic health record technology.  As a result, there may be some typos and.or nonsensical language introduced into the chart that either overlooked in editing/review and/or that I am unable to correct as patient care needs require me to prioritize my attention to bedside patient care rather than electronic documentation. MA

## 2020-07-13 ENCOUNTER — TELEPHONE (OUTPATIENT)
Dept: PULMONOLOGY | Facility: CLINIC | Age: 50
End: 2020-07-13

## 2020-07-13 NOTE — TELEPHONE ENCOUNTER
----- Message from SOFY Rangel sent at 7/9/2020  1:13 PM EDT -----  Do you care to let her know that her CT showed completed resolution of pneumonia and was otherwise a normal study.    ----- Message -----  From: Interface, Rad Results [place] In  Sent: 7/7/2020   2:15 PM EDT  To: SOFY Rangel

## 2020-08-07 ENCOUNTER — TRANSCRIBE ORDERS (OUTPATIENT)
Dept: ADMINISTRATIVE | Facility: HOSPITAL | Age: 50
End: 2020-08-07

## 2020-08-07 ENCOUNTER — LAB (OUTPATIENT)
Dept: LAB | Facility: HOSPITAL | Age: 50
End: 2020-08-07

## 2020-08-07 DIAGNOSIS — G25.0 BENIGN ESSENTIAL TREMOR: ICD-10-CM

## 2020-08-07 DIAGNOSIS — Z79.899 ENCOUNTER FOR LONG-TERM (CURRENT) USE OF OTHER MEDICATIONS: Primary | ICD-10-CM

## 2020-08-07 DIAGNOSIS — R53.83 TIREDNESS: ICD-10-CM

## 2020-08-07 DIAGNOSIS — E55.9 VITAMIN D DEFICIENCY, UNSPECIFIED: ICD-10-CM

## 2020-08-07 DIAGNOSIS — G60.8 HEREDITARY SENSORY NEUROPATHY: ICD-10-CM

## 2020-08-07 DIAGNOSIS — Z79.899 ENCOUNTER FOR LONG-TERM (CURRENT) USE OF OTHER MEDICATIONS: ICD-10-CM

## 2020-08-07 LAB
6-ACETYL MORPHINE: NEGATIVE
AMPHET+METHAMPHET UR QL: NEGATIVE
BARBITURATES UR QL SCN: POSITIVE
BENZODIAZ UR QL SCN: NEGATIVE
BUPRENORPHINE SERPL-MCNC: NEGATIVE NG/ML
CANNABINOIDS SERPL QL: NEGATIVE
COCAINE UR QL: NEGATIVE
METHADONE UR QL SCN: NEGATIVE
OPIATES UR QL: NEGATIVE
OXYCODONE UR QL SCN: NEGATIVE
PCP UR QL SCN: NEGATIVE

## 2020-08-07 PROCEDURE — 82306 VITAMIN D 25 HYDROXY: CPT

## 2020-08-07 PROCEDURE — 80307 DRUG TEST PRSMV CHEM ANLYZR: CPT

## 2020-08-07 PROCEDURE — 36415 COLL VENOUS BLD VENIPUNCTURE: CPT

## 2020-08-07 PROCEDURE — 85027 COMPLETE CBC AUTOMATED: CPT

## 2020-08-07 PROCEDURE — 80184 ASSAY OF PHENOBARBITAL: CPT

## 2020-08-07 PROCEDURE — 84443 ASSAY THYROID STIM HORMONE: CPT

## 2020-08-07 PROCEDURE — 80053 COMPREHEN METABOLIC PANEL: CPT

## 2020-08-08 LAB
25(OH)D3 SERPL-MCNC: 37.2 NG/ML (ref 30–100)
ALBUMIN SERPL-MCNC: 3.3 G/DL (ref 3.5–5.2)
ALBUMIN/GLOB SERPL: 0.9 G/DL
ALP SERPL-CCNC: 96 U/L (ref 39–117)
ALT SERPL W P-5'-P-CCNC: 23 U/L (ref 1–33)
ANION GAP SERPL CALCULATED.3IONS-SCNC: 8 MMOL/L (ref 5–15)
AST SERPL-CCNC: 35 U/L (ref 1–32)
BILIRUB SERPL-MCNC: 0.6 MG/DL (ref 0–1.2)
BUN SERPL-MCNC: 5 MG/DL (ref 6–20)
BUN/CREAT SERPL: 5.9 (ref 7–25)
CALCIUM SPEC-SCNC: 9.3 MG/DL (ref 8.6–10.5)
CHLORIDE SERPL-SCNC: 100 MMOL/L (ref 98–107)
CO2 SERPL-SCNC: 29 MMOL/L (ref 22–29)
CREAT SERPL-MCNC: 0.85 MG/DL (ref 0.57–1)
DEPRECATED RDW RBC AUTO: 47.4 FL (ref 37–54)
ERYTHROCYTE [DISTWIDTH] IN BLOOD BY AUTOMATED COUNT: 13.2 % (ref 12.3–15.4)
GFR SERPL CREATININE-BSD FRML MDRD: 71 ML/MIN/1.73
GLOBULIN UR ELPH-MCNC: 3.6 GM/DL
GLUCOSE SERPL-MCNC: 167 MG/DL (ref 65–99)
HCT VFR BLD AUTO: 41.9 % (ref 34–46.6)
HGB BLD-MCNC: 14.5 G/DL (ref 12–15.9)
MCH RBC QN AUTO: 33.3 PG (ref 26.6–33)
MCHC RBC AUTO-ENTMCNC: 34.6 G/DL (ref 31.5–35.7)
MCV RBC AUTO: 96.1 FL (ref 79–97)
PHENOBARB SERPL-MCNC: <2.4 MCG/ML (ref 10–30)
PLATELET # BLD AUTO: 96 10*3/MM3 (ref 140–450)
PMV BLD AUTO: 13 FL (ref 6–12)
POTASSIUM SERPL-SCNC: 4.2 MMOL/L (ref 3.5–5.2)
PROT SERPL-MCNC: 6.9 G/DL (ref 6–8.5)
RBC # BLD AUTO: 4.36 10*6/MM3 (ref 3.77–5.28)
SODIUM SERPL-SCNC: 137 MMOL/L (ref 136–145)
TSH SERPL DL<=0.05 MIU/L-ACNC: 2.34 UIU/ML (ref 0.27–4.2)
WBC # BLD AUTO: 7.15 10*3/MM3 (ref 3.4–10.8)

## 2020-09-02 ENCOUNTER — OFFICE VISIT (OUTPATIENT)
Dept: PULMONOLOGY | Facility: CLINIC | Age: 50
End: 2020-09-02

## 2020-09-02 VITALS
WEIGHT: 293 LBS | HEIGHT: 63 IN | DIASTOLIC BLOOD PRESSURE: 82 MMHG | HEART RATE: 101 BPM | OXYGEN SATURATION: 95 % | BODY MASS INDEX: 51.91 KG/M2 | SYSTOLIC BLOOD PRESSURE: 138 MMHG | TEMPERATURE: 97.5 F

## 2020-09-02 DIAGNOSIS — J44.9 CHRONIC OBSTRUCTIVE PULMONARY DISEASE, UNSPECIFIED COPD TYPE (HCC): ICD-10-CM

## 2020-09-02 DIAGNOSIS — G47.33 OSA (OBSTRUCTIVE SLEEP APNEA): Primary | ICD-10-CM

## 2020-09-02 DIAGNOSIS — F17.210 CIGARETTE NICOTINE DEPENDENCE WITHOUT COMPLICATION: ICD-10-CM

## 2020-09-02 PROBLEM — J96.90 RESPIRATORY FAILURE: Status: RESOLVED | Noted: 2020-03-17 | Resolved: 2020-09-02

## 2020-09-02 PROCEDURE — 99214 OFFICE O/P EST MOD 30 MIN: CPT | Performed by: NURSE PRACTITIONER

## 2020-09-02 NOTE — PROGRESS NOTES
Have you had the Influenza Vaccine? yes    Would you like to receive this Vaccine today? no    Have you had the Pneumonia Vaccine?  yes   Would you like to receive this Vaccine today? no    Are you a current smoker? yes   Quit date? 1/4 PPD    Subjective    Tiki Campos presents for the following Shortness of Breath      History of Present Illness     Ms. Campos is a 50 year old female with a medical history significant for anxiety, depression, diabetes, hyperlipidemia, and hypertension.      She presents for a routine follow up on shortness of breath and sleep apnea.  She states that she continues to have shortness of breath. She also tells me that her swelling in her lower extremities has decreased some.  She was seen by Dr. Steiner, interventional cardiology in [place].  He placed her on a fluid restriction, as well as diuretics.  She is currently using Symbicort BID. She also uses supplemental oxygen as needed.  She states that she has been complaint with her cpap every night. She is a current smoker of about 1/4 ppd.      Review of Systems   Constitutional: Negative for activity change, fatigue and unexpected weight change.   HENT: Negative for congestion, postnasal drip and rhinorrhea.    Respiratory: Positive for shortness of breath. Negative for apnea and chest tightness.    Cardiovascular: Negative for chest pain and palpitations.   Gastrointestinal: Negative for nausea.   Allergic/Immunologic: Negative for environmental allergies.   Psychiatric/Behavioral: Negative for agitation and confusion.       Active Problems:  Problem List Items Addressed This Visit        Other    Cigarette nicotine dependence without complication      Other Visit Diagnoses     GIBRAN (obstructive sleep apnea)    -  Primary    Relevant Orders    Miscellaneous DME    Chronic obstructive pulmonary disease, unspecified COPD type (CMS/[facility])              Past Medical History:  Past Medical History:   Diagnosis Date   • Anxiety    •  Depression    • Diabetes mellitus (CMS/[facility])    • Hyperlipidemia    • Hypertension        Family History:  History reviewed. No pertinent family history.    Social History:  Social History     Tobacco Use   • Smoking status: Current Every Day Smoker     Packs/day: 0.50   • Smokeless tobacco: Never Used   Substance Use Topics   • Alcohol use: Never     Frequency: Never       Current Medications:  Current Outpatient Medications   Medication Sig Dispense Refill   • albuterol sulfate  (90 Base) MCG/ACT inhaler Inhale 2 puffs Every 4 (Four) Hours As Needed for Wheezing. 1 inhaler 1   • budesonide-formoterol (SYMBICORT) 160-4.5 MCG/ACT inhaler Inhale 2 puffs 2 (Two) Times a Day. 1 inhaler 11   • clonazePAM (KlonoPIN) 0.5 MG tablet Take 0.5 mg by mouth 3 (Three) Times a Day As Needed for Anxiety.     • DULoxetine (CYMBALTA) 60 MG capsule Take 60 mg by mouth 2 (Two) Times a Day.     • estrogens, conjugated, (PREMARIN) 0.625 MG tablet Take 0.625 mg by mouth Daily.     • furosemide (LASIX) 40 MG tablet Take 40 mg by mouth 2 (Two) Times a Day.     • hydroCHLOROthiazide (HYDRODIURIL) 25 MG tablet      • insulin regular (HumuLIN R) 500 UNIT/ML patient supplied pump Inject  under the skin into the appropriate area as directed Continuous. Prior to [facility] Admission, Patient was on: basal 2.5u per hour     • lactobacillus acidophilus (RISAQUAD) capsule capsule Take 1 capsule by mouth Daily.     • magnesium citrate 1.745 GM/30ML solution solution Take  by mouth 1 (One) Time.     • metoprolol succinate XL (TOPROL-XL) 50 MG 24 hr tablet Take 50 mg by mouth 2 (Two) Times a Day.     • multivitamin (DAILY TERRI) tablet tablet Take 1 tablet by mouth Every Night.     • pantoprazole (PROTONIX) 40 MG EC tablet Take 40 mg by mouth 2 (Two) Times a Day.     • potassium chloride (K-DUR) 10 MEQ CR tablet Take 10 mEq by mouth 2 (Two) Times a Day.     • pregabalin (LYRICA) 150 MG capsule Take 150 mg by mouth 2 (Two) Times a Day. Prior to  [facility] Admission, Patient was on: pt states that she is supposed to be on 200 mg 3 times daily, I called and got a list from pharmacy and the only one they had filled for her was 150 mg 2 times daily.     • QUEtiapine (SEROquel) 200 MG tablet Take 200 mg by mouth Every Night.     • ranolazine (RANEXA) 500 MG 12 hr tablet Take 500 mg by mouth 2 (Two) Times a Day.     • vitamin E 1000 UNIT capsule Take 1,000 Units by mouth Daily.       No current facility-administered medications for this visit.        Allergies:  Allergies   Allergen Reactions   • Morphine Hives       Vitals:  /82   Pulse 101   Temp 97.5 °F (36.4 °C) (Temporal)   Ht 160 cm (63\")   Wt (!) 144 kg (317 lb)   LMP  (LMP Unknown)   SpO2 95%   BMI 56.15 kg/m²     Imaging:    Imaging Results (Most Recent)     None          Pulmonary Functions Testing Results:    No results found for: FEV1, FVC, NUK1XJN, TLC, DLCO    Results for orders placed or performed in visit on 08/07/20   Vitamin D 25 Hydroxy   Result Value Ref Range    25 Hydroxy, Vitamin D 37.2 30.0 - 100.0 ng/ml   Phenobarbital Level   Result Value Ref Range    Phenobarbital <2.4 (L) 10.0 - 30.0 mcg/mL   CBC (No Diff)   Result Value Ref Range    WBC 7.15 3.40 - 10.80 10*3/mm3    RBC 4.36 3.77 - 5.28 10*6/mm3    Hemoglobin 14.5 12.0 - 15.9 g/dL    Hematocrit 41.9 34.0 - 46.6 %    MCV 96.1 79.0 - 97.0 fL    MCH 33.3 (H) 26.6 - 33.0 pg    MCHC 34.6 31.5 - 35.7 g/dL    RDW 13.2 12.3 - 15.4 %    RDW-SD 47.4 37.0 - 54.0 fl    MPV 13.0 (H) 6.0 - 12.0 fL    Platelets 96 (L) 140 - 450 10*3/mm3   TSH   Result Value Ref Range    TSH 2.340 0.270 - 4.200 uIU/mL   Comprehensive Metabolic Panel   Result Value Ref Range    Glucose 167 (H) 65 - 99 mg/dL    BUN 5 (L) 6 - 20 mg/dL    Creatinine 0.85 0.57 - 1.00 mg/dL    Sodium 137 136 - 145 mmol/L    Potassium 4.2 3.5 - 5.2 mmol/L    Chloride 100 98 - 107 mmol/L    CO2 29.0 22.0 - 29.0 mmol/L    Calcium 9.3 8.6 - 10.5 mg/dL    Total Protein 6.9 6.0 - 8.5 " g/dL    Albumin 3.30 (L) 3.50 - 5.20 g/dL    ALT (SGPT) 23 1 - 33 U/L    AST (SGOT) 35 (H) 1 - 32 U/L    Alkaline Phosphatase 96 39 - 117 U/L    Total Bilirubin 0.6 0.0 - 1.2 mg/dL    eGFR Non African Amer 71 >60 mL/min/1.73    Globulin 3.6 gm/dL    A/G Ratio 0.9 g/dL    BUN/Creatinine Ratio 5.9 (L) 7.0 - 25.0    Anion Gap 8.0 5.0 - 15.0 mmol/L   Urine Drug Screen - Urine, Clean Catch   Result Value Ref Range    Amphetamine Screen, Urine Negative Negative    Barbiturates Screen, Urine Positive (A) Negative    Benzodiazepine Screen, Urine Negative Negative    Cocaine Screen, Urine Negative Negative    Methadone Screen, Urine Negative Negative    Opiate Screen Negative Negative    Phencyclidine (PCP), Urine Negative Negative    THC, Screen, Urine Negative Negative    6-ACETYL MORPHINE Negative Negative    Buprenorphine, Screen, Urine Negative Negative    Oxycodone Screen, Urine Negative Negative       Objective   Physical Exam     GENERAL APPEARANCE: Well developed, well nourished, alert and cooperative, and appears to be in no acute distress.    HEAD: normocephalic. Atraumatic.    EYES: PERRL, EOMI. Vision is grossly intact.    THROAT: Oral cavity and pharynx normal. No inflammation, swelling, exudate, or lesions.     NECK: Neck supple.  No thyromegaly.    CARDIAC: Normal S1 and S2. No S3, S4 or murmurs. Rhythm is regular.     RESPIRATORY:Bilateral air entry positive. Bilateral diminished breath sounds. No wheezing, crackles or rhonchi noted.    GI: Positive bowel sounds. Soft, nondistended, nontender.     MUSCULOSKELETAL: No significant deformity or joint abnormality. No edema. Peripheral pulses intact. No varicosities.    NEUROLOGICAL: Strength and sensation symmetric and intact throughout.     PSYCHIATRIC: The mental examination revealed the patient was oriented to person, place, and time.       Assessment/Plan        COPD, unspecified type:  -Continue albuterol treatments as needed.  -Continue Symbicort  BID.  -PFT showed no obstruction with no significant bronchodilator response.  Air trapping was noted.  A moderately severe ventilatory defect was noted.  DLCO is normal.  -6 MWT was unable to be done as that patient has severe back and leg pain.    -CT chest was reviewed.    GIBRAN:  -Complaint with use of cpap every night.  -Patient's Body mass index is 56.15 kg/m². BMI is above normal parameters. Recommendations include: exercise counseling and nutrition counseling.  - Patient was educated on positive airway pressure treatment.  As per CMS guidelines, more than 4 hours on 70% of observed nights is considered adherence. Patient was strongly encouraged to use CPAP as much as possible during sleep as more CPAP use is equal to more benefit. Use of heated humidification in positive airway pressure treatment to improve the adherence to the device.  In case of claustrophobia, we will provide the patient cognitive behavioral therapy and desensitization. Oral appliances use will be discussed with the patient in case of mild to moderate sleep apnea or if the patient with severe disease fail positive airway pressure treatment.       The patient was extensively educated on the consequences of untreated obstructive sleep apnea namely cardiovascular/metabolic disorder, neurocognitive deficit, daytime sleepiness, motor vehicle accidents, depression, mood disorders and reduced quality of life.  At the end of conversation, the patient voices understanding of the disease process and treatment modality.  Patient also understands the risk of untreated obstructive sleep apnea and benefit benefits of the treatment.    Counseling time was greater than 10 minutes.        Lower Extremity Swelling left greater that right:  -Referred her to see interventional cardiology for venous insufficiency.  Cardiology put her on a fluid restriction and diuretics.      Current Smoker:  Smokes about  3/4 ppd    Tiki Andres  reports that she has been  smoking. She has been smoking about 0.50 packs per day. She has never used smokeless tobacco.. I have educated her on the risk of diseases from using tobacco products such as cancer, COPD and heart diease.     I advised her to quit and she is not willing to quit.          ICD-10-CM ICD-9-CM   1. GIBRAN (obstructive sleep apnea) G47.33 327.23   2. Cigarette nicotine dependence without complication F17.210 305.1   3. Chronic obstructive pulmonary disease, unspecified COPD type (CMS/[facility]) J44.9 496       Return in about 6 months (around 3/2/2021).

## 2020-09-03 DIAGNOSIS — G47.33 OSA (OBSTRUCTIVE SLEEP APNEA): Primary | ICD-10-CM

## 2020-11-13 ENCOUNTER — TELEPHONE (OUTPATIENT)
Dept: PULMONOLOGY | Facility: CLINIC | Age: 50
End: 2020-11-13

## 2020-11-13 DIAGNOSIS — G47.33 OSA (OBSTRUCTIVE SLEEP APNEA): Primary | ICD-10-CM

## 2020-11-13 RX ORDER — BENZONATATE 100 MG/1
100 CAPSULE ORAL 3 TIMES DAILY PRN
Qty: 45 CAPSULE | Refills: 0 | Status: SHIPPED | OUTPATIENT
Start: 2020-11-13 | End: 2020-12-07

## 2020-11-13 RX ORDER — IPRATROPIUM BROMIDE AND ALBUTEROL SULFATE 2.5; .5 MG/3ML; MG/3ML
3 SOLUTION RESPIRATORY (INHALATION) 4 TIMES DAILY PRN
Qty: 120 ML | Refills: 11 | Status: SHIPPED | OUTPATIENT
Start: 2020-11-13 | End: 2021-05-20 | Stop reason: SDUPTHER

## 2020-11-13 RX ORDER — ALBUTEROL SULFATE 90 UG/1
2 AEROSOL, METERED RESPIRATORY (INHALATION) EVERY 4 HOURS PRN
Qty: 18 G | Refills: 8 | Status: SHIPPED | OUTPATIENT
Start: 2020-11-13 | End: 2021-05-20 | Stop reason: SDUPTHER

## 2020-11-13 RX ORDER — AMOXICILLIN AND CLAVULANATE POTASSIUM 875; 125 MG/1; MG/1
1 TABLET, FILM COATED ORAL 2 TIMES DAILY
Qty: 14 TABLET | Refills: 0 | Status: SHIPPED | OUTPATIENT
Start: 2020-11-13 | End: 2020-11-20

## 2020-11-13 RX ORDER — PREDNISONE 10 MG/1
TABLET ORAL
Qty: 31 TABLET | Refills: 0 | Status: SHIPPED | OUTPATIENT
Start: 2020-11-13 | End: 2021-05-20

## 2020-11-13 NOTE — TELEPHONE ENCOUNTER
Patient called this morning stating that she was feeling more short of breath despite inhaler use and nebulizer treatments.  She stated that she completed a Z-Gino and steroids 2 weeks ago.  No known heart failure issues.  She stated that she was tested for influenza and Covid yesterday which were negative.    · Ordered a higher strength antibiotic   · ordered taper pack of steroids   · Ordered Tessalon capsules to help frequent coughing.    · Ordered duo nebs for her nebulizer machine as this may provide more benefit than albuterol alone  · Ordered Ventolin inhaler refills   · Recommended close monitoring of SPO2 with goal of 88% or greater.  She stated that her oxygen saturation was maintained appropriately without desaturations.  Stated that she could increase to 3 L/min supplemental oxygen if needed as she currently uses 2 L, but recommended to seek further evaluation if needing a higher amount    Recommended if symptoms not improve over the next few days to seek PCP evaluation ED for lab work-up, chest x-ray, further evaluation.  She agreed to this plan as she preferred to stay home at this time if tolerated.      Obstructive sleep apnea  · Contacted [facility] [employer] [facility] per her request as she stated that her sleep machine did not feel as strong as when she initially started use  They will send a compliance report to see if any changes can be made now, and then discussed when she makes an appointment sooner that we can order an titration study at that time if needed.

## 2020-11-13 NOTE — TELEPHONE ENCOUNTER
GIBRAN:    Compliance report reviewed. Patient stated that she felt like the settings were not as strong as they were previously.    · Ordered to change the Autopap pressure limits to 8-20 cm    (Median lowest pressure was 1 0.8 with maximum pressure of 17)  Previously set at 5-20 cm limits.

## 2020-12-07 RX ORDER — BENZONATATE 100 MG/1
CAPSULE ORAL
Qty: 45 CAPSULE | Refills: 0 | Status: SHIPPED | OUTPATIENT
Start: 2020-12-07 | End: 2021-08-20

## 2021-03-23 ENCOUNTER — BULK ORDERING (OUTPATIENT)
Dept: CASE MANAGEMENT | Facility: OTHER | Age: 51
End: 2021-03-23

## 2021-03-23 DIAGNOSIS — Z23 IMMUNIZATION DUE: ICD-10-CM

## 2021-03-27 ENCOUNTER — APPOINTMENT (OUTPATIENT)
Dept: GENERAL RADIOLOGY | Facility: HOSPITAL | Age: 51
End: 2021-03-27

## 2021-03-27 ENCOUNTER — APPOINTMENT (OUTPATIENT)
Dept: CT IMAGING | Facility: HOSPITAL | Age: 51
End: 2021-03-27

## 2021-03-27 ENCOUNTER — HOSPITAL ENCOUNTER (EMERGENCY)
Facility: HOSPITAL | Age: 51
Discharge: HOME | End: 2021-03-27
Attending: EMERGENCY MEDICINE | Admitting: EMERGENCY MEDICINE

## 2021-03-27 VITALS
OXYGEN SATURATION: 98 % | DIASTOLIC BLOOD PRESSURE: 65 MMHG | HEIGHT: 63 IN | TEMPERATURE: 98.7 F | SYSTOLIC BLOOD PRESSURE: 122 MMHG | RESPIRATION RATE: 18 BRPM | BODY MASS INDEX: 51.91 KG/M2 | HEART RATE: 75 BPM | WEIGHT: 293 LBS

## 2021-03-27 DIAGNOSIS — R07.9 CHEST PAIN, UNSPECIFIED TYPE: ICD-10-CM

## 2021-03-27 DIAGNOSIS — M79.18 MUSCULOSKELETAL PAIN: ICD-10-CM
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
## 2021-03-27 LAB
ALBUMIN SERPL-MCNC: 3.35 G/DL (ref 3.5–5.2)
ALBUMIN/GLOB SERPL: 0.9 G/DL
ALP SERPL-CCNC: 132 U/L (ref 39–117)
ALT SERPL W P-5'-P-CCNC: 22 U/L (ref 1–33)
ANION GAP SERPL CALCULATED.3IONS-SCNC: 10.5 MMOL/L (ref 5–15)
AST SERPL-CCNC: 37 U/L (ref 1–32)
BASOPHILS # BLD AUTO: 0.03 10*3/MM3 (ref 0–0.2)
BASOPHILS NFR BLD AUTO: 0.4 % (ref 0–1.5)
BILIRUB SERPL-MCNC: 0.4 MG/DL (ref 0–1.2)
BUN SERPL-MCNC: 7 MG/DL (ref 6–20)
BUN/CREAT SERPL: 9.1 (ref 7–25)
CALCIUM SPEC-SCNC: 9.1 MG/DL (ref 8.6–10.5)
CHLORIDE SERPL-SCNC: 103 MMOL/L (ref 98–107)
CO2 SERPL-SCNC: 23.5 MMOL/L (ref 22–29)
CREAT SERPL-MCNC: 0.77 MG/DL (ref 0.57–1)
D DIMER PPP FEU-MCNC: 1.01 MCGFEU/ML (ref 0–0.5)
DEPRECATED RDW RBC AUTO: 51.3 FL (ref 37–54)
EOSINOPHIL # BLD AUTO: 0.09 10*3/MM3 (ref 0–0.4)
EOSINOPHIL NFR BLD AUTO: 1.1 % (ref 0.3–6.2)
ERYTHROCYTE [DISTWIDTH] IN BLOOD BY AUTOMATED COUNT: 14.1 % (ref 12.3–15.4)
GFR SERPL CREATININE-BSD FRML MDRD: 79 ML/MIN/1.73
GLOBULIN UR ELPH-MCNC: 3.7 GM/DL
GLUCOSE SERPL-MCNC: 221 MG/DL (ref 65–99)
HCT VFR BLD AUTO: 44.6 % (ref 34–46.6)
HGB BLD-MCNC: 14.4 G/DL (ref 12–15.9)
HOLD SPECIMEN: NORMAL
IMM GRANULOCYTES # BLD AUTO: 0.02 10*3/MM3 (ref 0–0.05)
IMM GRANULOCYTES NFR BLD AUTO: 0.2 % (ref 0–0.5)
LYMPHOCYTES # BLD AUTO: 3.62 10*3/MM3 (ref 0.7–3.1)
LYMPHOCYTES NFR BLD AUTO: 42.4 % (ref 19.6–45.3)
MCH RBC QN AUTO: 32.1 PG (ref 26.6–33)
MCHC RBC AUTO-ENTMCNC: 32.3 G/DL (ref 31.5–35.7)
MCV RBC AUTO: 99.6 FL (ref 79–97)
MONOCYTES # BLD AUTO: 0.69 10*3/MM3 (ref 0.1–0.9)
MONOCYTES NFR BLD AUTO: 8.1 % (ref 5–12)
NEUTROPHILS NFR BLD AUTO: 4.09 10*3/MM3 (ref 1.7–7)
NEUTROPHILS NFR BLD AUTO: 47.8 % (ref 42.7–76)
NRBC BLD AUTO-RTO: 0 /100 WBC (ref 0–0.2)
PLATELET # BLD AUTO: 131 10*3/MM3 (ref 140–450)
PMV BLD AUTO: 11.5 FL (ref 6–12)
POTASSIUM SERPL-SCNC: 4.3 MMOL/L (ref 3.5–5.2)
PROT SERPL-MCNC: 7 G/DL (ref 6–8.5)
QT INTERVAL: 438 MS
QTC INTERVAL: 505 MS
RBC # BLD AUTO: 4.48 10*6/MM3 (ref 3.77–5.28)
SODIUM SERPL-SCNC: 137 MMOL/L (ref 136–145)
TROPONIN T SERPL-MCNC: <0.01 NG/ML (ref 0–0.03)
TROPONIN T SERPL-MCNC: <0.01 NG/ML (ref 0–0.03)
WBC # BLD AUTO: 8.54 10*3/MM3 (ref 3.4–10.8)
WHOLE BLOOD HOLD SPECIMEN: NORMAL
WHOLE BLOOD HOLD SPECIMEN: NORMAL

## 2021-03-27 PROCEDURE — 0 IOPAMIDOL PER 1 ML: Performed by: EMERGENCY MEDICINE

## 2021-03-27 PROCEDURE — 70450 CT HEAD/BRAIN W/O DYE: CPT

## 2021-03-27 PROCEDURE — 25010000002 KETOROLAC TROMETHAMINE PER 15 MG: Performed by: EMERGENCY MEDICINE

## 2021-03-27 PROCEDURE — 73130 X-RAY EXAM OF HAND: CPT

## 2021-03-27 PROCEDURE — 93005 ELECTROCARDIOGRAM TRACING: CPT | Performed by: EMERGENCY MEDICINE

## 2021-03-27 PROCEDURE — 36415 COLL VENOUS BLD VENIPUNCTURE: CPT

## 2021-03-27 PROCEDURE — 85379 FIBRIN DEGRADATION QUANT: CPT | Performed by: EMERGENCY MEDICINE

## 2021-03-27 PROCEDURE — 99283 EMERGENCY DEPT VISIT LOW MDM: CPT

## 2021-03-27 PROCEDURE — 85025 COMPLETE CBC W/AUTO DIFF WBC: CPT | Performed by: EMERGENCY MEDICINE

## 2021-03-27 PROCEDURE — 72128 CT CHEST SPINE W/O DYE: CPT

## 2021-03-27 PROCEDURE — 72125 CT NECK SPINE W/O DYE: CPT

## 2021-03-27 PROCEDURE — 93010 ELECTROCARDIOGRAM REPORT: CPT | Performed by: INTERNAL MEDICINE

## 2021-03-27 PROCEDURE — 84484 ASSAY OF TROPONIN QUANT: CPT | Performed by: EMERGENCY MEDICINE

## 2021-03-27 PROCEDURE — 72131 CT LUMBAR SPINE W/O DYE: CPT

## 2021-03-27 PROCEDURE — 71275 CT ANGIOGRAPHY CHEST: CPT

## 2021-03-27 PROCEDURE — 96372 THER/PROPH/DIAG INJ SC/IM: CPT

## 2021-03-27 PROCEDURE — 80053 COMPREHEN METABOLIC PANEL: CPT | Performed by: EMERGENCY MEDICINE

## 2021-03-27 PROCEDURE — 71046 X-RAY EXAM CHEST 2 VIEWS: CPT

## 2021-03-27 RX ORDER — IBUPROFEN 600 MG/1
600 TABLET ORAL EVERY 6 HOURS PRN
Qty: 40 TABLET | Refills: 0 | Status: SHIPPED | OUTPATIENT
Start: 2021-03-27 | End: 2021-08-20

## 2021-03-27 RX ORDER — KETOROLAC TROMETHAMINE 30 MG/ML
30 INJECTION, SOLUTION INTRAMUSCULAR; INTRAVENOUS ONCE
Status: COMPLETED | OUTPATIENT
Start: 2021-03-27 | End: 2021-03-27

## 2021-03-27 RX ORDER — CYCLOBENZAPRINE HCL 5 MG
5 TABLET ORAL 3 TIMES DAILY PRN
Qty: 20 TABLET | Refills: 0 | Status: SHIPPED | OUTPATIENT
Start: 2021-03-27 | End: 2021-08-20

## 2021-03-27 RX ORDER — HYDROCODONE BITARTRATE AND ACETAMINOPHEN 5; 325 MG/1; MG/1
1 TABLET ORAL ONCE
Status: COMPLETED | OUTPATIENT
Start: 2021-03-27 | End: 2021-03-27

## 2021-03-27 RX ADMIN — KETOROLAC TROMETHAMINE 30 MG: 30 INJECTION, SOLUTION INTRAMUSCULAR; INTRAVENOUS at 06:46

## 2021-03-27 RX ADMIN — HYDROCODONE BITARTRATE AND ACETAMINOPHEN 1 TABLET: 5; 325 TABLET ORAL at 06:45

## 2021-03-27 RX ADMIN — IOPAMIDOL 70 ML: 755 INJECTION, SOLUTION INTRAVENOUS at 05:25

## 2021-03-27 NOTE — ED NOTES
After triage pt complained her chest was starting to hurt; EKG ordered.     Miryam Smiley, RN  03/27/21 0202

## 2021-03-27 NOTE — ED PROVIDER NOTES
Subjective   51-year-old female presents with multiple complaints.  She states she has had several falls out of her chair, fell forward and hit her head, neck, back.  She complains of headache and pain throughout the spine.  She has tingling in her extremities, a history of neuropathy but states it is worse after the fall.  She also complains of severe sharp pains in her chest radiating through to her back.  No abdominal pain.  No vomiting or diarrhea.  She does complain of right hand pain from the fall as well.      History provided by:  Patient   used: No        Review of Systems   Constitutional: Negative.  Negative for fever.   Eyes: Negative.    Respiratory: Positive for shortness of breath.    Cardiovascular: Positive for chest pain.   Gastrointestinal: Negative.  Negative for abdominal pain.   Genitourinary: Negative.  Negative for dysuria.   Musculoskeletal: Positive for arthralgias, back pain, myalgias and neck pain.   Skin: Negative.    Neurological: Positive for headaches. Negative for weakness.        (+) Tingling   Psychiatric/Behavioral: Negative.    All other systems reviewed and are negative.      Past Medical History:   Diagnosis Date   • Anxiety    • Depression    • Diabetes mellitus (CMS/HCC)    • Hyperlipidemia    • Hypertension        Allergies   Allergen Reactions   • Morphine Hives       Past Surgical History:   Procedure Laterality Date   • APPENDECTOMY     • CHOLECYSTECTOMY     • HYSTERECTOMY     • TONSILLECTOMY         No family history on file.    Social History     Socioeconomic History   • Marital status:      Spouse name: Not on file   • Number of children: Not on file   • Years of education: Not on file   • Highest education level: Not on file   Tobacco Use   • Smoking status: Current Every Day Smoker     Packs/day: 0.50   • Smokeless tobacco: Never Used   Substance and Sexual Activity   • Alcohol use: Never   • Drug use: Never   • Sexual activity: Defer            Objective   Physical Exam  Vitals and nursing note reviewed.   Constitutional:       General: She is not in acute distress.     Appearance: She is well-developed. She is obese. She is not diaphoretic.   HENT:      Head: Normocephalic and atraumatic.      Right Ear: External ear normal.      Left Ear: External ear normal.      Nose: Nose normal.   Eyes:      Conjunctiva/sclera: Conjunctivae normal.      Pupils: Pupils are equal, round, and reactive to light.   Neck:      Vascular: No JVD.      Trachea: No tracheal deviation.   Cardiovascular:      Rate and Rhythm: Normal rate and regular rhythm.      Heart sounds: Normal heart sounds. No murmur heard.     Pulmonary:      Effort: Pulmonary effort is normal. No respiratory distress.      Breath sounds: Normal breath sounds. No wheezing.   Abdominal:      General: Bowel sounds are normal.      Palpations: Abdomen is soft.      Tenderness: There is no abdominal tenderness.   Musculoskeletal:         General: No deformity. Normal range of motion.      Cervical back: Normal range of motion and neck supple.      Comments: Diffuse nonfocal spinal ttp  R hand ttp  No deformity or wound   Skin:     General: Skin is warm and dry.      Capillary Refill: Capillary refill takes less than 2 seconds.      Coloration: Skin is not pale.      Findings: No erythema or rash.   Neurological:      General: No focal deficit present.      Mental Status: She is alert and oriented to person, place, and time.      Cranial Nerves: Cranial nerves are intact. No cranial nerve deficit.      Sensory: Sensation is intact.      Motor: Motor function is intact.      Coordination: Coordination is intact.   Psychiatric:         Behavior: Behavior normal.         Thought Content: Thought content normal.         Procedures       EKG: NSR rate 80, , QTc 505, NSST.      ED Course                                           MDM  Number of Diagnoses or Management Options  Chest pain, unspecified  type  Fall, initial encounter  Musculoskeletal pain  Diagnosis management comments: 51-year-old female with multiple pains, musculoskeletal injuries from a fall.  Imaging obtained of affected areas.  No acute findings noted.  Basic labs unremarkable.  I am not at all concerned for ACS, PE, or other emergent condition at this time.  Plan for symptom control.  Strict return precautions discussed.       Amount and/or Complexity of Data Reviewed  Clinical lab tests: ordered and reviewed  Tests in the radiology section of CPT®: ordered and reviewed  Independent visualization of images, tracings, or specimens: yes        Final diagnoses:   Fall, initial encounter   Chest pain, unspecified type   Musculoskeletal pain       ED Disposition  ED Disposition     ED Disposition Condition Comment    Discharge Stable           Bartolome Recinos MD  [address] [facility] [address]  [phone]    Schedule an appointment as soon as possible for a visit            Medication List      New Prescriptions    cyclobenzaprine 5 MG tablet  Commonly known as: FLEXERIL  Take 1 tablet by mouth 3 (Three) Times a Day As Needed for Muscle Spasms.     ibuprofen 600 MG tablet  Commonly known as: ADVIL,MOTRIN  Take 1 tablet by mouth Every 6 (Six) Hours As Needed for Mild Pain  or Moderate Pain .           Where to Get Your Medications      These medications were sent to [facility] - [place], [place] - [address] - [phone] [facility] [phone] FX  [address]  [facility] [address]    Phone: [phone]   · cyclobenzaprine 5 MG tablet  · ibuprofen 600 MG tablet          Willie Julien MD  03/27/21 0828

## 2021-05-19 ENCOUNTER — OFFICE VISIT (OUTPATIENT)
Dept: PULMONOLOGY | Facility: CLINIC | Age: 51
End: 2021-05-19

## 2021-05-19 VITALS
TEMPERATURE: 98.2 F | HEART RATE: 84 BPM | DIASTOLIC BLOOD PRESSURE: 62 MMHG | SYSTOLIC BLOOD PRESSURE: 118 MMHG | HEIGHT: 62 IN | BODY MASS INDEX: 53.92 KG/M2 | WEIGHT: 293 LBS | OXYGEN SATURATION: 98 %

## 2021-05-19 DIAGNOSIS — F17.210 CIGARETTE NICOTINE DEPENDENCE WITHOUT COMPLICATION: ICD-10-CM

## 2021-05-19 DIAGNOSIS — J44.9 CHRONIC OBSTRUCTIVE PULMONARY DISEASE, UNSPECIFIED COPD TYPE (HCC): Primary | ICD-10-CM

## 2021-05-19 DIAGNOSIS — G47.33 OSA (OBSTRUCTIVE SLEEP APNEA): ICD-10-CM

## 2021-05-19 DIAGNOSIS — R47.01 APHASIA: ICD-10-CM

## 2021-05-19 PROCEDURE — 99406 BEHAV CHNG SMOKING 3-10 MIN: CPT | Performed by: PHYSICIAN ASSISTANT

## 2021-05-19 PROCEDURE — 99214 OFFICE O/P EST MOD 30 MIN: CPT | Performed by: PHYSICIAN ASSISTANT

## 2021-05-19 NOTE — PROGRESS NOTES
Interval history since last visit: Stable respiratory symptoms.  Sleep machine malfunctioning.    Recent hospitalizations: None    Investigations (imaging, PFT's, labs, sleep study, record requests, etc.)    Have you had the Influenza Vaccine? yes    Would you like to receive this Vaccine today? no    Have you had the Pneumonia Vaccine?  yes   Would you like to receive this Vaccine today? no    Subjective    Tiki Campos presents for the following Sleep Apnea      History of Present Illness     Patient presents today for follow-up of obstructive sleep apnea, COPD, cigarette nicotine dependence.  She remains notable for cigarette use approximately 1 pack/day or less.  She realizes the importance of cessation but expresses anxiety about quitting at this time as this is a big stress reliever for her.  She states that she recent divorce lives home alone.  She states that respiratory symptoms are overall stable at this time on current inhaler therapy. Shortness of breath can be increased at times of exertion.  She states that she will occasionally use her supplemental oxygen concentrator at home, but does not have ambulatory supplies.  This attaches to her sleep machine.    She reports that persistent cough has significantly improved over time as she was previously requiring Tessalon Perles.  She believes that she may have had the Covid virus at that time last year although testing was negative.  Symptoms were long-lasting for several months before finally improving.  Her cousin is notable for the alpha-1 antitrypsin deficiency.  She states that she has had her sleep [place]  It recently seems to be malfunctioning as she has noticed a continuous air leak when not wearing the machine.  She also states that the electronic part of the machine also seems to be malfunctioning as well, and request an order for a new machine if possible.  Although the pressure was initially not  strong enough, she states that the new adjustment to 8-20 cm limits feels slightly too strong at times.  She attempted to remain compliant throughout the duration of sleep, but states that she is intermittently during the nighttime.  She is willing to recommend a sleep study if needed as this was previously completed many years ago.  She reports some concern for intermittent aphasia, intermittent confusion.  She denies any known syncopal spells.  She reports a significant family history for TIAs and seizures.  She reports several recent falls and March that required an ER visit with CT scan imaging that was overall negative.  She has followed with neurology in the past at [place] [place].  CT findings were recently notable for cirrhosis, and she has recently undergone further assessment for that issue.  She denies significant alcohol usage.        Review of Systems   Constitutional: Negative for chills and fever.   HENT: Negative for congestion and postnasal drip.    Respiratory: Negative for cough, shortness of breath and wheezing.    Cardiovascular: Negative for chest pain.       Active Problems:  Problem List Items Addressed This Visit        Neuro    Aphasia    Relevant Orders    Ambulatory Referral to Neurology       Pulmonary and Pneumonias    Chronic obstructive pulmonary disease (CMS/HCC) - Primary    Relevant Medications    albuterol sulfate  (90 Base) MCG/ACT inhaler    budesonide-formoterol (SYMBICORT) 160-4.5 MCG/ACT inhaler    ipratropium-albuterol (DUO-NEB) 0.5-2.5 mg/3 ml nebulizer    Other Relevant Orders    Full Pulmonary Function Test With Bronchodilator       Sleep    GIBRAN (obstructive sleep apnea)    Relevant Orders    Home Sleep Study       Tobacco    Cigarette nicotine dependence without complication    Relevant Medications    nicotine (Nicoderm CQ) 7 MG/24HR patch          Past Medical History:  Past Medical History:   Diagnosis Date   • Anxiety    • Depression    • Diabetes  mellitus (CMS/HCC)    • Hyperlipidemia    • Hypertension        Family History:  History reviewed. No pertinent family history.    Social History:  Social History     Tobacco Use   • Smoking status: Current Every Day Smoker     Packs/day: 1.00   • Smokeless tobacco: Never Used   Substance Use Topics   • Alcohol use: Never       Current Medications:  Current Outpatient Medications   Medication Sig Dispense Refill   • albuterol sulfate  (90 Base) MCG/ACT inhaler Inhale 2 puffs Every 4 (Four) Hours As Needed for Wheezing. 18 g 8   • benzonatate (TESSALON) 100 MG capsule TAKE 1 CAPSULE BY MOUTH EVERY 8 HOURS WITH PLENTY OF WATER IF NEEDED FOR COUGH 45 capsule 0   • budesonide-formoterol (SYMBICORT) 160-4.5 MCG/ACT inhaler Inhale 2 puffs 2 (Two) Times a Day. 1 each 8   • cyclobenzaprine (FLEXERIL) 5 MG tablet Take 1 tablet by mouth 3 (Three) Times a Day As Needed for Muscle Spasms. 20 tablet 0   • DULoxetine (CYMBALTA) 60 MG capsule Take 60 mg by mouth 2 (Two) Times a Day.     • estrogens, conjugated, (PREMARIN) 0.625 MG tablet Take 0.625 mg by mouth Daily.     • furosemide (LASIX) 40 MG tablet Take 40 mg by mouth 2 (Two) Times a Day.     • hydroCHLOROthiazide (HYDRODIURIL) 25 MG tablet      • ibuprofen (ADVIL,MOTRIN) 600 MG tablet Take 1 tablet by mouth Every 6 (Six) Hours As Needed for Mild Pain  or Moderate Pain . 40 tablet 0   • insulin regular (HumuLIN R) 500 UNIT/ML patient supplied pump Inject  under the skin into the appropriate area as directed Continuous. Prior to [facility] Admission, Patient was on: basal 2.5u per hour     • ipratropium-albuterol (DUO-NEB) 0.5-2.5 mg/3 ml nebulizer Take 3 mL by nebulization 4 (Four) Times a Day As Needed for Wheezing or Shortness of Air. 120 mL 11   • lactobacillus acidophilus (RISAQUAD) capsule capsule Take 1 capsule by mouth Daily.     • magnesium citrate 1.745 GM/30ML solution solution Take  by mouth 1 (One) Time.     • metoprolol succinate XL (TOPROL-XL) 50 MG 24 hr  "tablet Take 50 mg by mouth 2 (Two) Times a Day.     • multivitamin (DAILY TERRI) tablet tablet Take 1 tablet by mouth Every Night.     • pantoprazole (PROTONIX) 40 MG EC tablet Take 40 mg by mouth 2 (Two) Times a Day.     • potassium chloride (K-DUR) 10 MEQ CR tablet Take 10 mEq by mouth 2 (Two) Times a Day.     • pregabalin (LYRICA) 150 MG capsule Take 150 mg by mouth 2 (Two) Times a Day. Prior to [facility] Admission, Patient was on: pt states that she is supposed to be on 200 mg 3 times daily, I called and got a list from pharmacy and the only one they had filled for her was 150 mg 2 times daily.     • QUEtiapine (SEROquel) 200 MG tablet Take 200 mg by mouth Every Night.     • ranolazine (RANEXA) 500 MG 12 hr tablet Take 500 mg by mouth 2 (Two) Times a Day.     • vitamin E 1000 UNIT capsule Take 1,000 Units by mouth Daily.     • clonazePAM (KlonoPIN) 0.5 MG tablet Take 0.5 mg by mouth 3 (Three) Times a Day As Needed for Anxiety.     • nicotine (Nicoderm CQ) 7 MG/24HR patch Place 1 patch on the skin as directed by provider Daily. 1 each 8     No current facility-administered medications for this visit.       Allergies:  Allergies   Allergen Reactions   • Morphine Hives       Vitals:  /62   Pulse 84   Temp 98.2 °F (36.8 °C) (Temporal)   Ht 157.5 cm (62\")   Wt 136 kg (299 lb)   LMP  (LMP Unknown)   SpO2 98%   BMI 54.69 kg/m²     Imaging:    Imaging Results (Most Recent)     None          Pulmonary Functions Testing Results:    No results found for: FEV1, FVC, UWI2DUR, TLC, DLCO    Results for orders placed or performed during the hospital encounter of 03/27/21   Comprehensive Metabolic Panel    Specimen: Arm, Right; Blood   Result Value Ref Range    Glucose 221 (H) 65 - 99 mg/dL    BUN 7 6 - 20 mg/dL    Creatinine 0.77 0.57 - 1.00 mg/dL    Sodium 137 136 - 145 mmol/L    Potassium 4.3 3.5 - 5.2 mmol/L    Chloride 103 98 - 107 mmol/L    CO2 23.5 22.0 - 29.0 mmol/L    Calcium 9.1 8.6 - 10.5 mg/dL    Total " Protein 7.0 6.0 - 8.5 g/dL    Albumin 3.35 (L) 3.50 - 5.20 g/dL    ALT (SGPT) 22 1 - 33 U/L    AST (SGOT) 37 (H) 1 - 32 U/L    Alkaline Phosphatase 132 (H) 39 - 117 U/L    Total Bilirubin 0.4 0.0 - 1.2 mg/dL    eGFR Non African Amer 79 >60 mL/min/1.73    Globulin 3.7 gm/dL    A/G Ratio 0.9 g/dL    BUN/Creatinine Ratio 9.1 7.0 - 25.0    Anion Gap 10.5 5.0 - 15.0 mmol/L   Troponin    Specimen: Arm, Right; Blood   Result Value Ref Range    Troponin T <0.010 0.000 - 0.030 ng/mL   Troponin    Specimen: Arm, Right; Blood   Result Value Ref Range    Troponin T <0.010 0.000 - 0.030 ng/mL   CBC Auto Differential    Specimen: Arm, Right; Blood   Result Value Ref Range    WBC 8.54 3.40 - 10.80 10*3/mm3    RBC 4.48 3.77 - 5.28 10*6/mm3    Hemoglobin 14.4 12.0 - 15.9 g/dL    Hematocrit 44.6 34.0 - 46.6 %    MCV 99.6 (H) 79.0 - 97.0 fL    MCH 32.1 26.6 - 33.0 pg    MCHC 32.3 31.5 - 35.7 g/dL    RDW 14.1 12.3 - 15.4 %    RDW-SD 51.3 37.0 - 54.0 fl    MPV 11.5 6.0 - 12.0 fL    Platelets 131 (L) 140 - 450 10*3/mm3    Neutrophil % 47.8 42.7 - 76.0 %    Lymphocyte % 42.4 19.6 - 45.3 %    Monocyte % 8.1 5.0 - 12.0 %    Eosinophil % 1.1 0.3 - 6.2 %    Basophil % 0.4 0.0 - 1.5 %    Immature Grans % 0.2 0.0 - 0.5 %    Neutrophils, Absolute 4.09 1.70 - 7.00 10*3/mm3    Lymphocytes, Absolute 3.62 (H) 0.70 - 3.10 10*3/mm3    Monocytes, Absolute 0.69 0.10 - 0.90 10*3/mm3    Eosinophils, Absolute 0.09 0.00 - 0.40 10*3/mm3    Basophils, Absolute 0.03 0.00 - 0.20 10*3/mm3    Immature Grans, Absolute 0.02 0.00 - 0.05 10*3/mm3    nRBC 0.0 0.0 - 0.2 /100 WBC   D-dimer, Quantitative    Specimen: Arm, Right; Blood   Result Value Ref Range    D-Dimer, Quantitative 1.01 (C) 0.00 - 0.50 MCGFEU/mL   ECG 12 Lead   Result Value Ref Range    QT Interval 438 ms    QTC Interval 505 ms   Light Blue Top   Result Value Ref Range    Extra Tube hold for add-on    Green Top (Gel)   Result Value Ref Range    Extra Tube Hold for add-ons.    Lavender Top   Result  Value Ref Range    Extra Tube hold for add-on        Objective   Physical Exam  Vitals reviewed.   Constitutional:       General: She is not in acute distress.     Appearance: She is well-developed. She is not diaphoretic.   HENT:      Head: Normocephalic and atraumatic.   Neck:      Thyroid: No thyromegaly.   Cardiovascular:      Rate and Rhythm: Normal rate and regular rhythm.      Heart sounds: Normal heart sounds, S1 normal and S2 normal.   Pulmonary:      Effort: Pulmonary effort is normal.      Breath sounds: No wheezing, rhonchi or rales.   Musculoskeletal:         General: No swelling. Normal range of motion.   Skin:     General: Skin is warm and dry.   Neurological:      Mental Status: She is alert and oriented to person, place, and time.   Psychiatric:         Behavior: Behavior normal.         Assessment/Plan      I have reviewed the past medical history, family history, social history, surgical history, and allergies.     Reviewed the CT PE from 3/27/2021.   Negative for PE.  No nodules.  Notable for cirrhosis with splenomegaly.    CT of the head without contrast was negative for acute abnormality.    Reviewed the previous compliance report.    Reviewed PFT from 2020.        Reviewed previous echocardiogram from March 2020.          ICD-10-CM ICD-9-CM   1. Chronic obstructive pulmonary disease, unspecified COPD type (CMS/HCC)  J44.9 496   2. GIBRAN (obstructive sleep apnea)  G47.33 327.23   3. Aphasia  R47.01 784.3   4. Cigarette nicotine dependence without complication  F17.210 305.1          COPD  · Continue albuterol as needed  · Continue DuoNeb as needed  · Continue Symbicort 2 puffs twice daily  · Ordered repeat PFT  · We will request recent echocardiogram report from Dr. Monsalve  · Awaited walk oximetry test saturations noted at 98% on room air.  · Completed alpha-1 antitrypsin.    Family history is notable for alpha-1 antitrypsin deficiency.  She states that      Obstructive sleep apnea:  Settings were  recently adjusted to 8-20 cm due to difficulty with the lower pressure limit.   Not well-tolerated as this is too strong compared to the 5 cm limit.  She requested a new machine order as her previous machine seemed to be malfunctioning overall.  However, it has been extended time since her previous sleep study, requiring retesting as confirmed with her supply company ([facility])   · Ordered home sleep study (patient preferred over in office titration study) to confirm GIBRAN.   Once results are received, we will order a new AutoPap machine and  Adjust settings to 6-20cm with nasal only mask.     Addendum: 7/9/2021.   Sleep study received and confirmed mild sleep apnea.  · DME order placed for AutoPap 6-20 cm with nasal only mask.        Cigarette nicotine dependence:  Patient is a current smoker.  Notable for 1 pack/day average.   Smoking cessation counseling completed.  She initially stated that discontinuation was complicated as smoking is a stress reliever for her, but later stated that she was interested in achieving cessation.   We discussed the side effects of cigarette use, which can can include worsening COPD, cancer risk, heart attack, stroke risk in the setting of recent concern for ongoing possible neurological issues. We discussed various methods for cessation assistance.   She was interested in trying low-dose nicotine patches for assistance with cessation.  Recommended to use once daily, and set a goal to slowly decrease use over the next several months. Recommend goal cessation date of 6 months.   Smoking cessation counseling completed for 5 minutes.  · Ordered lowest-dose nicotine patches.         Aphasia:  After concerns of recent neurological issues that have included intermittent aphasia, confusion, recent falls that have developed over the last year, I recommend that she be seen by her previous neurologist as it has been several years.  She contact their office but they  required a referral due to the time lapse.  · Ordered neurology referral to Dr. Willie Jones of [facility]      Return in about 6 months (around 11/19/2021), or as needed.

## 2021-05-20 PROBLEM — G47.33 OSA (OBSTRUCTIVE SLEEP APNEA): Status: ACTIVE | Noted: 2021-05-20

## 2021-05-20 PROBLEM — R47.01 APHASIA: Status: ACTIVE | Noted: 2021-05-20

## 2021-05-20 PROBLEM — J44.9 CHRONIC OBSTRUCTIVE PULMONARY DISEASE: Status: ACTIVE | Noted: 2021-05-20

## 2021-05-20 RX ORDER — ALBUTEROL SULFATE 90 UG/1
2 AEROSOL, METERED RESPIRATORY (INHALATION) EVERY 4 HOURS PRN
Qty: 18 G | Refills: 8 | Status: SHIPPED | OUTPATIENT
Start: 2021-05-20

## 2021-05-20 RX ORDER — IPRATROPIUM BROMIDE AND ALBUTEROL SULFATE 2.5; .5 MG/3ML; MG/3ML
3 SOLUTION RESPIRATORY (INHALATION) 4 TIMES DAILY PRN
Qty: 120 ML | Refills: 11 | Status: SHIPPED | OUTPATIENT
Start: 2021-05-20 | End: 2022-05-06

## 2021-05-20 RX ORDER — BUDESONIDE AND FORMOTEROL FUMARATE DIHYDRATE 160; 4.5 UG/1; UG/1
2 AEROSOL RESPIRATORY (INHALATION) 2 TIMES DAILY
Qty: 1 EACH | Refills: 8 | Status: SHIPPED | OUTPATIENT
Start: 2021-05-20

## 2021-05-21 DIAGNOSIS — Z01.818 OTHER SPECIFIED PRE-OPERATIVE EXAMINATION: Primary | ICD-10-CM

## 2021-05-28 ENCOUNTER — TELEPHONE (OUTPATIENT)
Dept: PULMONOLOGY | Facility: CLINIC | Age: 51
End: 2021-05-28

## 2021-06-09 ENCOUNTER — LAB (OUTPATIENT)
Dept: LAB | Facility: HOSPITAL | Age: 51
End: 2021-06-09

## 2021-06-09 ENCOUNTER — HOSPITAL ENCOUNTER (OUTPATIENT)
Dept: RESPIRATORY THERAPY | Facility: HOSPITAL | Age: 51
Discharge: HOME | End: 2021-06-09

## 2021-06-09 VITALS — OXYGEN SATURATION: 99 % | RESPIRATION RATE: 16 BRPM | HEART RATE: 60 BPM

## 2021-06-09 DIAGNOSIS — J44.9 CHRONIC OBSTRUCTIVE PULMONARY DISEASE, UNSPECIFIED COPD TYPE (HCC): ICD-10-CM

## 2021-06-09 DIAGNOSIS — Z01.818 OTHER SPECIFIED PRE-OPERATIVE EXAMINATION: ICD-10-CM

## 2021-06-09 LAB — SARS-COV-2 RDRP RESP QL NAA+PROBE: NORMAL

## 2021-06-09 PROCEDURE — C9803 HOPD COVID-19 SPEC COLLECT: HCPCS

## 2021-06-09 PROCEDURE — 94729 DIFFUSING CAPACITY: CPT

## 2021-06-09 PROCEDURE — 94727 GAS DIL/WSHOT DETER LNG VOL: CPT | Performed by: INTERNAL MEDICINE

## 2021-06-09 PROCEDURE — 94060 EVALUATION OF WHEEZING: CPT | Performed by: INTERNAL MEDICINE

## 2021-06-09 PROCEDURE — 94060 EVALUATION OF WHEEZING: CPT

## 2021-06-09 PROCEDURE — 87635 SARS-COV-2 COVID-19 AMP PRB: CPT | Performed by: PHYSICIAN ASSISTANT

## 2021-06-09 PROCEDURE — 94729 DIFFUSING CAPACITY: CPT | Performed by: INTERNAL MEDICINE

## 2021-06-09 PROCEDURE — 94727 GAS DIL/WSHOT DETER LNG VOL: CPT

## 2021-06-09 PROCEDURE — 94799 UNLISTED PULMONARY SVC/PX: CPT

## 2021-06-09 PROCEDURE — 94640 AIRWAY INHALATION TREATMENT: CPT

## 2021-06-09 RX ORDER — ALBUTEROL SULFATE 2.5 MG/3ML
2.5 SOLUTION RESPIRATORY (INHALATION) ONCE
Status: COMPLETED | OUTPATIENT
Start: 2021-06-09 | End: 2021-06-09

## 2021-06-09 RX ADMIN — ALBUTEROL SULFATE 2.5 MG: 2.5 SOLUTION RESPIRATORY (INHALATION) at 13:40

## 2021-06-18 ENCOUNTER — TELEPHONE (OUTPATIENT)
Dept: PULMONOLOGY | Facility: CLINIC | Age: 51
End: 2021-06-18

## 2021-06-18 DIAGNOSIS — R47.01 APHASIA: ICD-10-CM

## 2021-06-18 DIAGNOSIS — J44.9 CHRONIC OBSTRUCTIVE PULMONARY DISEASE, UNSPECIFIED COPD TYPE (HCC): Primary | ICD-10-CM

## 2021-06-18 RX ORDER — TIOTROPIUM BROMIDE INHALATION SPRAY 3.12 UG/1
2 SPRAY, METERED RESPIRATORY (INHALATION)
Qty: 1 EACH | Refills: 8 | Status: SHIPPED | OUTPATIENT
Start: 2021-06-18 | End: 2021-06-18

## 2021-06-18 RX ORDER — TIOTROPIUM BROMIDE INHALATION SPRAY 3.12 UG/1
2 SPRAY, METERED RESPIRATORY (INHALATION)
Qty: 1 EACH | Refills: 8 | Status: SHIPPED | OUTPATIENT
Start: 2021-06-18 | End: 2021-08-06

## 2021-06-18 NOTE — TELEPHONE ENCOUNTER
RECEIVED A FAX FROM [employer] [facility] STATING:    THEY HAVE NO RECORDS FOR THIS REQUEST

## 2021-06-18 NOTE — TELEPHONE ENCOUNTER
Called to review PFT results from 6/9/2021      Previous CT results were notable for air trapping.    Thus we will continue with inhaler therapy if needed, but we will titrate down therapy.  She does not notice much benefit from Symbicort and states that powders are irritating to her mouth.    · Discontinued Symbicort  (noticed increased shakiness with use, some oral irritation)  · Starting on Spiriva Respimat 2.5 mcg 2 puffs daily  · We will apply for prior authorization as needed  · Patient is unable to use of powder inhalers due to oral irritation (ex. Incruse, Anoro).  · Following for home sleep study results so new machine can be ordered (her older machine has malfunctioned recently. ResMed machine)

## 2021-08-19 ENCOUNTER — OFFICE VISIT (OUTPATIENT)
Dept: PULMONOLOGY | Facility: CLINIC | Age: 51
End: 2021-08-19

## 2021-08-19 VITALS
HEIGHT: 63 IN | DIASTOLIC BLOOD PRESSURE: 82 MMHG | HEART RATE: 70 BPM | WEIGHT: 288 LBS | OXYGEN SATURATION: 98 % | SYSTOLIC BLOOD PRESSURE: 142 MMHG | TEMPERATURE: 97.5 F | BODY MASS INDEX: 51.03 KG/M2

## 2021-08-19 DIAGNOSIS — G47.33 OSA (OBSTRUCTIVE SLEEP APNEA): Primary | ICD-10-CM

## 2021-08-19 DIAGNOSIS — F17.210 CIGARETTE NICOTINE DEPENDENCE WITHOUT COMPLICATION: ICD-10-CM

## 2021-08-19 DIAGNOSIS — J98.4 RESTRICTIVE LUNG DISEASE: ICD-10-CM

## 2021-08-19 PROCEDURE — 99407 BEHAV CHNG SMOKING > 10 MIN: CPT | Performed by: NURSE PRACTITIONER

## 2021-08-19 PROCEDURE — 99214 OFFICE O/P EST MOD 30 MIN: CPT | Performed by: NURSE PRACTITIONER

## 2021-08-19 RX ORDER — NICOTINE 21 MG/24HR
1 PATCH, TRANSDERMAL 24 HOURS TRANSDERMAL EVERY 24 HOURS
Qty: 28 EACH | Refills: 5 | Status: SHIPPED | OUTPATIENT
Start: 2021-08-19

## 2021-08-19 RX ORDER — POLYETHYLENE GLYCOL 3350 17 G
4 POWDER IN PACKET (EA) ORAL AS NEEDED
Qty: 168 EACH | Refills: 5 | Status: SHIPPED | OUTPATIENT
Start: 2021-08-19

## 2021-08-19 NOTE — PROGRESS NOTES
"Chief Complaint  Sleep Apnea    Subjective          Tiki Campos presents to [facility] PULMONARY AND CRITICAL CARE MEDICINE  History of Present Illness     Ms. Campos is a 51 year old female with a medical history significant for depression, GIBRAN, hyperlipidemia, hypertension, and diabetes.    She presents today for a routine follow up on sleep apnea.  She states that she has been doing well since her last visit.  She reports that she is currently taking albuterol as needed and has stopped Symbicort and Spiriva as they did not seem to help her breathing much.  She is compliant with use of her CPAP nightly.  She states that she is still smoking about 1-1/2 packs/day.        Objective   Vital Signs:   /82   Pulse 70   Temp 97.5 °F (36.4 °C) (Temporal)   Ht 160 cm (63\")   Wt 131 kg (288 lb)   SpO2 98%   BMI 51.02 kg/m²     Physical Exam     GENERAL APPEARANCE: Well developed, well nourished, alert and cooperative, and appears to be in no acute distress.    HEAD: normocephalic. Atraumatic.    EYES: PERRL, EOMI. Vision is grossly intact.    THROAT: Oral cavity and pharynx normal. No inflammation, swelling, exudate, or lesions.     NECK: Neck supple.  No thyromegaly.    CARDIAC: Normal S1 and S2. No S3, S4 or murmurs. Rhythm is regular.     RESPIRATORY:Bilateral air entry positive. Bilateral diminished breath sounds. No wheezing, crackles or rhonchi noted.    GI: Positive bowel sounds. Soft, nondistended, nontender.     MUSCULOSKELETAL: No significant deformity or joint abnormality. No edema. Peripheral pulses intact. No varicosities.    NEUROLOGICAL: Strength and sensation symmetric and intact throughout.     PSYCHIATRIC: The mental examination revealed the patient was oriented to person, place, and time.     Result Review :   The following data was reviewed by: SOFY Rangel on 08/19/2021:  Common labs    Common Labsle 3/27/21 3/27/21    0138 0138   Glucose  221 (A)   BUN "  7   Creatinine  0.77   eGFR Non African Am  79   Sodium  137   Potassium  4.3   Chloride  103   Calcium  9.1   Albumin  3.35 (A)   Total Bilirubin  0.4   Alkaline Phosphatase  132 (A)   AST (SGOT)  37 (A)   ALT (SGPT)  22   WBC 8.54    Hemoglobin 14.4    Hematocrit 44.6    Platelets 131 (A)    (A) Abnormal value       Comments are available for some flowsheets but are not being displayed.                    Assessment and Plan    Diagnoses and all orders for this visit:    1. GIBRAN (obstructive sleep apnea) (Primary)    2. Body mass index (BMI) 50.0-59.9, adult (CMS/[facility])    3. Cigarette nicotine dependence without complication    4. Restrictive lung disease    Other orders  -     nicotine (NICODERM CQ) 14 MG/24HR patch; Place 1 patch on the skin as directed by provider Daily.  Dispense: 28 each; Refill: 5  -     nicotine polacrilex (COMMIT) 4 MG lozenge; Dissolve 1 lozenge in the mouth As Needed for Smoking Cessation.  Dispense: 168 each; Refill: 5           Restrictive lung disease:  Spirometry showed no obstruction with no significant bronchodilator response.  Mild restriction was noted.  -She is stopped taking Symbicort and Spiriva as she does not feel like they helped her breathing much.  -We will continue albuterol every 4 hours as needed.        Obstructive sleep apnea:  Compliant with use of CPAP nightly.  -Patient's Body mass index is 51.02 kg/m². indicating that she is morbidly obese (BMI > 40 or > 35 with obesity - related health condition). Obesity-related health conditions include the following: obstructive sleep apnea, hypertension, diabetes mellitus and dyslipidemias. Obesity is unchanged. BMI is is above average; BMI management plan is completed. We discussed portion control and increasing exercise.  - Patient was educated on positive airway pressure treatment.  As per CMS guidelines, more than 4 hours on 70% of observed nights is considered adherence. Patient was strongly encouraged to use CPAP as  much as possible during sleep as more CPAP use is equal to more benefit. Use of heated humidification in positive airway pressure treatment to improve the adherence to the device.  In case of claustrophobia, we will provide the patient cognitive behavioral therapy and desensitization. Oral appliances use will be discussed with the patient in case of mild to moderate sleep apnea or if the patient with severe disease fail positive airway pressure treatment.       The patient was extensively educated on the consequences of untreated obstructive sleep apnea namely cardiovascular/metabolic disorder, neurocognitive deficit, daytime sleepiness, motor vehicle accidents, depression, mood disorders and reduced quality of life.  At the end of conversation, the patient voices understanding of the disease process and treatment modality.  Patient also understands the risk of untreated obstructive sleep apnea and benefit benefits of the treatment.    Counseling time was greater than 10 minutes.        Cigarette nicotine dependence:  Currently smoking about 1-1/2 packs/day.    Tiki Campos  reports that she has been smoking. She has been smoking about 1.00 pack per day. She has never used smokeless tobacco.. I have educated her on the risk of diseases from using tobacco products such as cancer, COPD and heart disease.     I advised her to quit and she is willing to quit. We have discussed the following method/s for tobacco cessation:  Counseling OTC Cessation Products.  Together we have set a quit date for 1 month from today.  She will follow up with me in a few months or sooner to check on her progress.    I spent >10 minutes counseling the patient.         Prescriptions for nicotine patches and nicotine lozenges.        Follow Up   Return in about 6 months (around 2/19/2022).  Patient was given instructions and counseling regarding her condition or for health maintenance advice. Please see specific information pulled into the  AVS if appropriate.

## 2021-08-20 PROBLEM — J44.9 CHRONIC OBSTRUCTIVE PULMONARY DISEASE (HCC): Status: RESOLVED | Noted: 2021-05-20 | Resolved: 2021-08-20

## 2021-08-23 ENCOUNTER — TRANSCRIBE ORDERS (OUTPATIENT)
Dept: ADMINISTRATIVE | Facility: HOSPITAL | Age: 51
End: 2021-08-23

## 2021-08-23 DIAGNOSIS — R56.9 GENERALIZED-ONSET SEIZURES (HCC): Primary | ICD-10-CM

## 2021-08-30 ENCOUNTER — APPOINTMENT (OUTPATIENT)
Dept: RESPIRATORY THERAPY | Facility: HOSPITAL | Age: 51
End: 2021-08-30

## 2021-09-07 ENCOUNTER — APPOINTMENT (OUTPATIENT)
Dept: RESPIRATORY THERAPY | Facility: HOSPITAL | Age: 51
End: 2021-09-07

## 2021-09-14 ENCOUNTER — APPOINTMENT (OUTPATIENT)
Dept: RESPIRATORY THERAPY | Facility: HOSPITAL | Age: 51
End: 2021-09-14

## 2021-09-21 ENCOUNTER — APPOINTMENT (OUTPATIENT)
Dept: RESPIRATORY THERAPY | Facility: HOSPITAL | Age: 51
End: 2021-09-21

## 2021-09-28 ENCOUNTER — APPOINTMENT (OUTPATIENT)
Dept: RESPIRATORY THERAPY | Facility: HOSPITAL | Age: 51
End: 2021-09-28

## 2021-10-12 ENCOUNTER — APPOINTMENT (OUTPATIENT)
Dept: RESPIRATORY THERAPY | Facility: HOSPITAL | Age: 51
End: 2021-10-12

## 2021-10-19 ENCOUNTER — APPOINTMENT (OUTPATIENT)
Dept: RESPIRATORY THERAPY | Facility: HOSPITAL | Age: 51
End: 2021-10-19

## 2021-10-26 ENCOUNTER — APPOINTMENT (OUTPATIENT)
Dept: RESPIRATORY THERAPY | Facility: HOSPITAL | Age: 51
End: 2021-10-26

## 2021-11-02 ENCOUNTER — APPOINTMENT (OUTPATIENT)
Dept: RESPIRATORY THERAPY | Facility: HOSPITAL | Age: 51
End: 2021-11-02

## 2022-05-06 RX ORDER — IPRATROPIUM BROMIDE AND ALBUTEROL SULFATE 2.5; .5 MG/3ML; MG/3ML
3 SOLUTION RESPIRATORY (INHALATION) 4 TIMES DAILY PRN
Qty: 360 ML | Refills: 2 | Status: SHIPPED | OUTPATIENT
Start: 2022-05-06 | End: 2022-08-15

## 2022-05-09 ENCOUNTER — APPOINTMENT (OUTPATIENT)
Dept: GENERAL RADIOLOGY | Facility: HOSPITAL | Age: 52
End: 2022-05-09

## 2022-05-09 ENCOUNTER — HOSPITAL ENCOUNTER (EMERGENCY)
Facility: HOSPITAL | Age: 52
Discharge: HOME | End: 2022-05-09
Attending: STUDENT IN AN ORGANIZED HEALTH CARE EDUCATION/TRAINING PROGRAM | Admitting: STUDENT IN AN ORGANIZED HEALTH CARE EDUCATION/TRAINING PROGRAM

## 2022-05-09 VITALS
BODY MASS INDEX: 51.74 KG/M2 | SYSTOLIC BLOOD PRESSURE: 132 MMHG | HEIGHT: 63 IN | TEMPERATURE: 97.2 F | RESPIRATION RATE: 18 BRPM | HEART RATE: 71 BPM | WEIGHT: 292 LBS | OXYGEN SATURATION: 97 % | DIASTOLIC BLOOD PRESSURE: 53 MMHG

## 2022-05-09 DIAGNOSIS — M47.819 ARTHRITIS OF LOW BACK: ICD-10-CM

## 2022-05-09 DIAGNOSIS — M65.4 DE QUERVAIN'S TENOSYNOVITIS, RIGHT: Primary | ICD-10-CM

## 2022-05-09 PROCEDURE — 73110 X-RAY EXAM OF WRIST: CPT | Performed by: RADIOLOGY

## 2022-05-09 PROCEDURE — 72110 X-RAY EXAM L-2 SPINE 4/>VWS: CPT

## 2022-05-09 PROCEDURE — 99283 EMERGENCY DEPT VISIT LOW MDM: CPT

## 2022-05-09 PROCEDURE — 99282 EMERGENCY DEPT VISIT SF MDM: CPT

## 2022-05-09 PROCEDURE — 72110 X-RAY EXAM L-2 SPINE 4/>VWS: CPT | Performed by: RADIOLOGY

## 2022-05-09 PROCEDURE — 73110 X-RAY EXAM OF WRIST: CPT

## 2022-05-09 NOTE — ED PROVIDER NOTES
Subjective   Patient is a 52-year-old female with diabetes, hypertension, and hyperlipidemia that presents to the ED with complaints of right wrist pain that is been ongoing now for approximately a week with no known injury.  She also has an outpatient order from Dr. Cunningham for an x-ray of the lumbar spine.  She states she has been having some lower back pain with no known injury.  She denies any radicular symptoms, bladder or bowel incontinence, lower extremity weakness or paresthesias.  She also denies chest pain, shortness of breath, fever, chills, nausea, vomiting, headache, dizziness, abdominal pain, dysuria, diarrhea, or constipation.      History provided by:  Patient   used: No        Review of Systems   Constitutional: Negative.  Negative for chills, diaphoresis, fatigue and fever.   HENT: Negative.  Negative for congestion, dental problem, drooling, ear discharge, ear pain, facial swelling, hearing loss, postnasal drip, rhinorrhea, sinus pressure, sinus pain, sneezing, sore throat, tinnitus and trouble swallowing.    Eyes: Negative.  Negative for photophobia, pain, discharge, redness and itching.   Respiratory: Negative.  Negative for cough, shortness of breath and wheezing.    Cardiovascular: Negative.  Negative for chest pain.   Gastrointestinal: Negative.  Negative for abdominal distention, abdominal pain, constipation, diarrhea, nausea and vomiting.   Endocrine: Negative.    Genitourinary: Negative.  Negative for difficulty urinating, dysuria, flank pain and frequency.   Musculoskeletal: Positive for arthralgias. Negative for back pain, gait problem, joint swelling, myalgias, neck pain and neck stiffness.   Skin: Negative.    Neurological: Negative.  Negative for dizziness, tremors, seizures, syncope, facial asymmetry, speech difficulty, weakness, light-headedness, numbness and headaches.   Psychiatric/Behavioral: Negative.  Negative for confusion.   All other systems reviewed and are  negative.      Past Medical History:   Diagnosis Date   • Anxiety    • Depression    • Diabetes mellitus (CMS/HCC)    • Hyperlipidemia    • Hypertension        Allergies   Allergen Reactions   • Morphine Hives       Past Surgical History:   Procedure Laterality Date   • APPENDECTOMY     • CHOLECYSTECTOMY     • HYSTERECTOMY     • TONSILLECTOMY         No family history on file.    Social History     Socioeconomic History   • Marital status: Other   Tobacco Use   • Smoking status: Current Every Day Smoker     Packs/day: 1.00   • Smokeless tobacco: Never Used   Vaping Use   • Vaping Use: Never used   Substance and Sexual Activity   • Alcohol use: Never   • Drug use: Never   • Sexual activity: Defer           Objective   Physical Exam  Vitals and nursing note reviewed.   Constitutional:       General: She is not in acute distress.     Appearance: She is well-developed. She is not diaphoretic.   HENT:      Head: Normocephalic and atraumatic.      Right Ear: External ear normal.      Left Ear: External ear normal.      Nose: Nose normal.      Mouth/Throat:      Mouth: Mucous membranes are moist.      Pharynx: Oropharynx is clear.   Eyes:      Extraocular Movements: Extraocular movements intact.      Conjunctiva/sclera: Conjunctivae normal.      Pupils: Pupils are equal, round, and reactive to light.   Neck:      Vascular: No JVD.      Trachea: No tracheal deviation.   Cardiovascular:      Rate and Rhythm: Normal rate and regular rhythm.      Pulses: Normal pulses.      Heart sounds: Normal heart sounds. No murmur heard.  Pulmonary:      Effort: Pulmonary effort is normal. No respiratory distress.      Breath sounds: Normal breath sounds. No wheezing.   Abdominal:      General: Bowel sounds are normal. There is no distension.      Palpations: Abdomen is soft.      Tenderness: There is no abdominal tenderness. There is no guarding or rebound.   Musculoskeletal:         General: No deformity. Normal range of motion.       Cervical back: Normal range of motion and neck supple.      Comments: Examination of the right wrist reveals tenderness along the first extensor compartment at the base of the thumb.  She has increased pain with flexion of the thumb.  Her neurovascular status is intact.   Skin:     General: Skin is warm and dry.      Coloration: Skin is not pale.      Findings: No erythema or rash.   Neurological:      General: No focal deficit present.      Mental Status: She is alert and oriented to person, place, and time.      Cranial Nerves: No cranial nerve deficit.   Psychiatric:         Mood and Affect: Mood normal.         Behavior: Behavior normal.         Thought Content: Thought content normal.         Splint - Cast - Strapping    Date/Time: 5/9/2022 5:45 PM  Performed by: Shellie Antonio PA-C  Authorized by: Mejia Sandhu DO     Consent:     Consent obtained:  Verbal    Consent given by:  Patient    Risks, benefits, and alternatives were discussed: yes      Risks discussed:  Discoloration, numbness, pain and swelling    Alternatives discussed:  No treatment  Universal protocol:     Patient identity confirmed:  Arm band and verbally with patient  Pre-procedure details:     Distal neurologic exam:  Normal    Distal perfusion: distal pulses strong    Procedure details:     Location:  Wrist    Wrist location:  R wrist    Strapping: yes      Splint type:  Thumb spica    Supplies:  Prefabricated splint    Attestation: Splint applied and adjusted personally by me    Post-procedure details:     Distal neurologic exam:  Normal    Distal perfusion: distal pulses strong      Procedure completion:  Tolerated well, no immediate complications    Post-procedure imaging: not applicable                 ED Course  ED Course as of 05/09/22 1745   Mon May 09, 2022   1526 XR Wrist 3+ View Right  IMPRESSION:    No acute findings in the right wrist.     This report was finalized on 5/9/2022 3:21 PM by Dr. Sylvester Kauffman MD. []   5876  XR Spine Lumbar Complete 4+VW  IMPRESSION:    Degenerative changes lumbar spine as described.     This report was finalized on 5/9/2022 3:21 PM by Dr. Sylvester Kauffman MD. []   1526 Discussed plan of care with patient.  Advised if symptoms worsen return to ED.  Advised to follow-up with orthopedics in 1 to 2 days. [MH]      ED Course User Index  [] Shellie Antonio PA-C                                                 [facility]    Final diagnoses:   De Quervain's tenosynovitis, right   Arthritis of low back       ED Disposition  ED Disposition     ED Disposition   Discharge    Condition   Stable    Comment   --             Chastity, Sammy Maldonado MD  [address]   [place] [facility] [address] [phone]    Schedule an appointment as soon as possible for a visit in 1 day           Medication List      No changes were made to your prescriptions during this visit.          Shellie Antonio PA-C  05/09/22 [id number]

## 2022-05-09 NOTE — ED NOTES
Escorted patient to results pending area at this time, instructed patient on split flow process and patient's plan of care moving forward. Patient verbalized understanding, advised to notify staff of any further needs, oriented patient to call bell in results waiting area.

## 2022-08-15 RX ORDER — IPRATROPIUM BROMIDE AND ALBUTEROL SULFATE 2.5; .5 MG/3ML; MG/3ML
SOLUTION RESPIRATORY (INHALATION)
Qty: 360 ML | Refills: 2 | Status: SHIPPED | OUTPATIENT
Start: 2022-08-15

## 2023-06-03 ENCOUNTER — APPOINTMENT (OUTPATIENT)
Dept: CT IMAGING | Facility: HOSPITAL | Age: 53
End: 2023-06-03
Payer: COMMERCIAL

## 2023-06-03 ENCOUNTER — HOSPITAL ENCOUNTER (EMERGENCY)
Facility: HOSPITAL | Age: 53
Discharge: HOME | End: 2023-06-03
Attending: EMERGENCY MEDICINE
Payer: COMMERCIAL

## 2023-06-03 VITALS
HEIGHT: 63 IN | WEIGHT: 290 LBS | DIASTOLIC BLOOD PRESSURE: 56 MMHG | RESPIRATION RATE: 18 BRPM | TEMPERATURE: 98.2 F | SYSTOLIC BLOOD PRESSURE: 119 MMHG | OXYGEN SATURATION: 98 % | BODY MASS INDEX: 51.38 KG/M2 | HEART RATE: 74 BPM

## 2023-06-03 DIAGNOSIS — N30.91 HEMORRHAGIC CYSTITIS: Primary | ICD-10-CM

## 2023-06-03 LAB
ALBUMIN SERPL-MCNC: 3.2 G/DL (ref 3.5–5.2)
ALBUMIN/GLOB SERPL: 1 G/DL
ALP SERPL-CCNC: 112 U/L (ref 39–117)
ALT SERPL W P-5'-P-CCNC: 19 U/L (ref 1–33)
ANION GAP SERPL CALCULATED.3IONS-SCNC: 10.8 MMOL/L (ref 5–15)
AST SERPL-CCNC: 27 U/L (ref 1–32)
BACTERIA UR QL AUTO: ABNORMAL /HPF
BASOPHILS # BLD AUTO: 0.03 10*3/MM3 (ref 0–0.2)
BASOPHILS NFR BLD AUTO: 0.5 % (ref 0–1.5)
BILIRUB SERPL-MCNC: 0.7 MG/DL (ref 0–1.2)
BILIRUB UR QL STRIP: ABNORMAL
BUN SERPL-MCNC: 7 MG/DL (ref 6–20)
BUN/CREAT SERPL: 9 (ref 7–25)
CALCIUM SPEC-SCNC: 8.7 MG/DL (ref 8.6–10.5)
CHLORIDE SERPL-SCNC: 100 MMOL/L (ref 98–107)
CLARITY UR: ABNORMAL
CO2 SERPL-SCNC: 24.2 MMOL/L (ref 22–29)
COLOR UR: ABNORMAL
CREAT SERPL-MCNC: 0.78 MG/DL (ref 0.57–1)
DEPRECATED RDW RBC AUTO: 48.8 FL (ref 37–54)
EGFRCR SERPLBLD CKD-EPI 2021: 91.5 ML/MIN/1.73
EOSINOPHIL # BLD AUTO: 0.12 10*3/MM3 (ref 0–0.4)
EOSINOPHIL NFR BLD AUTO: 2.1 % (ref 0.3–6.2)
ERYTHROCYTE [DISTWIDTH] IN BLOOD BY AUTOMATED COUNT: 13.7 % (ref 12.3–15.4)
GLOBULIN UR ELPH-MCNC: 3.3 GM/DL
GLUCOSE SERPL-MCNC: 329 MG/DL (ref 65–99)
GLUCOSE UR STRIP-MCNC: ABNORMAL MG/DL
HBA1C MFR BLD: 9 % (ref 4.8–5.6)
HCT VFR BLD AUTO: 40.3 % (ref 34–46.6)
HGB BLD-MCNC: 12.9 G/DL (ref 12–15.9)
HGB UR QL STRIP.AUTO: ABNORMAL
HOLD SPECIMEN: NORMAL
HYALINE CASTS UR QL AUTO: ABNORMAL /LPF
IMM GRANULOCYTES # BLD AUTO: 0.02 10*3/MM3 (ref 0–0.05)
IMM GRANULOCYTES NFR BLD AUTO: 0.3 % (ref 0–0.5)
KETONES UR QL STRIP: NEGATIVE
LEUKOCYTE ESTERASE UR QL STRIP.AUTO: ABNORMAL
LYMPHOCYTES # BLD AUTO: 2.52 10*3/MM3 (ref 0.7–3.1)
LYMPHOCYTES NFR BLD AUTO: 43.2 % (ref 19.6–45.3)
MCH RBC QN AUTO: 31.3 PG (ref 26.6–33)
MCHC RBC AUTO-ENTMCNC: 32 G/DL (ref 31.5–35.7)
MCV RBC AUTO: 97.8 FL (ref 79–97)
MONOCYTES # BLD AUTO: 0.5 10*3/MM3 (ref 0.1–0.9)
MONOCYTES NFR BLD AUTO: 8.6 % (ref 5–12)
NEUTROPHILS NFR BLD AUTO: 2.65 10*3/MM3 (ref 1.7–7)
NEUTROPHILS NFR BLD AUTO: 45.3 % (ref 42.7–76)
NITRITE UR QL STRIP: POSITIVE
NRBC BLD AUTO-RTO: 0 /100 WBC (ref 0–0.2)
PH UR STRIP.AUTO: 6.5 [PH] (ref 5–8)
PLATELET # BLD AUTO: 88 10*3/MM3 (ref 140–450)
PMV BLD AUTO: 12.3 FL (ref 6–12)
POTASSIUM SERPL-SCNC: 4.3 MMOL/L (ref 3.5–5.2)
PROT SERPL-MCNC: 6.5 G/DL (ref 6–8.5)
PROT UR QL STRIP: ABNORMAL
RBC # BLD AUTO: 4.12 10*6/MM3 (ref 3.77–5.28)
RBC # UR STRIP: ABNORMAL /HPF
REF LAB TEST METHOD: ABNORMAL
SODIUM SERPL-SCNC: 135 MMOL/L (ref 136–145)
SP GR UR STRIP: 1.02 (ref 1–1.03)
SQUAMOUS #/AREA URNS HPF: ABNORMAL /HPF
TSH SERPL DL<=0.05 MIU/L-ACNC: 2.21 UIU/ML (ref 0.27–4.2)
UROBILINOGEN UR QL STRIP: ABNORMAL
WBC # UR STRIP: ABNORMAL /HPF
WBC NRBC COR # BLD: 5.84 10*3/MM3 (ref 3.4–10.8)
WHOLE BLOOD HOLD COAG: NORMAL
WHOLE BLOOD HOLD SPECIMEN: NORMAL

## 2023-06-03 PROCEDURE — 83036 HEMOGLOBIN GLYCOSYLATED A1C: CPT | Performed by: NURSE PRACTITIONER

## 2023-06-03 PROCEDURE — 25010000002 DIPHENHYDRAMINE PER 50 MG: Performed by: NURSE PRACTITIONER

## 2023-06-03 PROCEDURE — 74176 CT ABD & PELVIS W/O CONTRAST: CPT

## 2023-06-03 PROCEDURE — 80050 GENERAL HEALTH PANEL: CPT | Performed by: EMERGENCY MEDICINE

## 2023-06-03 PROCEDURE — 96365 THER/PROPH/DIAG IV INF INIT: CPT

## 2023-06-03 PROCEDURE — 87086 URINE CULTURE/COLONY COUNT: CPT | Performed by: EMERGENCY MEDICINE

## 2023-06-03 PROCEDURE — 96375 TX/PRO/DX INJ NEW DRUG ADDON: CPT

## 2023-06-03 PROCEDURE — 96361 HYDRATE IV INFUSION ADD-ON: CPT

## 2023-06-03 PROCEDURE — 81001 URINALYSIS AUTO W/SCOPE: CPT | Performed by: EMERGENCY MEDICINE

## 2023-06-03 PROCEDURE — 99284 EMERGENCY DEPT VISIT MOD MDM: CPT

## 2023-06-03 PROCEDURE — 25010000002 CEFTRIAXONE PER 250 MG: Performed by: NURSE PRACTITIONER

## 2023-06-03 PROCEDURE — 74176 CT ABD & PELVIS W/O CONTRAST: CPT | Performed by: RADIOLOGY

## 2023-06-03 RX ORDER — NITROFURANTOIN 25; 75 MG/1; MG/1
100 CAPSULE ORAL 2 TIMES DAILY
Qty: 14 CAPSULE | Refills: 0 | Status: SHIPPED | OUTPATIENT
Start: 2023-06-03 | End: 2023-06-10

## 2023-06-03 RX ORDER — SODIUM CHLORIDE 0.9 % (FLUSH) 0.9 %
10 SYRINGE (ML) INJECTION AS NEEDED
Status: DISCONTINUED | OUTPATIENT
Start: 2023-06-03 | End: 2023-06-03 | Stop reason: HOSPADM

## 2023-06-03 RX ORDER — FLUCONAZOLE 150 MG/1
150 TABLET ORAL ONCE
Qty: 1 TABLET | Refills: 0 | Status: SHIPPED | OUTPATIENT
Start: 2023-06-03 | End: 2023-06-03

## 2023-06-03 RX ORDER — CEFDINIR 300 MG/1
300 CAPSULE ORAL 2 TIMES DAILY
Qty: 20 CAPSULE | Refills: 0 | Status: SHIPPED | OUTPATIENT
Start: 2023-06-03 | End: 2023-06-03

## 2023-06-03 RX ORDER — HYDROCODONE BITARTRATE AND ACETAMINOPHEN 10; 325 MG/1; MG/1
1 TABLET ORAL ONCE
Status: COMPLETED | OUTPATIENT
Start: 2023-06-03 | End: 2023-06-03

## 2023-06-03 RX ORDER — DIPHENHYDRAMINE HYDROCHLORIDE 50 MG/ML
25 INJECTION INTRAMUSCULAR; INTRAVENOUS ONCE
Status: COMPLETED | OUTPATIENT
Start: 2023-06-03 | End: 2023-06-03

## 2023-06-03 RX ADMIN — SODIUM CHLORIDE 1000 ML: 9 INJECTION, SOLUTION INTRAVENOUS at 11:50

## 2023-06-03 RX ADMIN — SODIUM CHLORIDE 2 G: 9 INJECTION, SOLUTION INTRAVENOUS at 12:39

## 2023-06-03 RX ADMIN — HYDROCODONE BITARTRATE AND ACETAMINOPHEN 1 TABLET: 10; 325 TABLET ORAL at 12:56

## 2023-06-03 RX ADMIN — DIPHENHYDRAMINE HYDROCHLORIDE 25 MG: 50 INJECTION INTRAMUSCULAR; INTRAVENOUS at 13:13

## 2023-06-03 NOTE — DISCHARGE INSTRUCTIONS
Follow up with your primary care provider in 2-3 days.     Return to the emergency room for worsening symptoms

## 2023-06-03 NOTE — ED PROVIDER NOTES
Subjective   History of Present Illness  This 51-year-old female presents emergency room complaint of hematuria x3 months.  Patient reports she does have lower back pain but states this is not unusual for her.  She denies any fever.  Patient denies nausea and vomiting.  Patient denies any alleviating or worsening factors patient denies trying interventions.      Review of Systems   Constitutional: Negative.    HENT: Negative.     Eyes: Negative.    Respiratory: Negative.     Cardiovascular: Negative.    Gastrointestinal: Negative.    Endocrine: Negative.    Genitourinary: Negative.    Musculoskeletal: Negative.    Skin: Negative.    Allergic/Immunologic: Negative.    Neurological: Negative.    Hematological: Negative.    Psychiatric/Behavioral: Negative.       Past Medical History:   Diagnosis Date    Anxiety     Depression     Diabetes mellitus     Hyperlipidemia     Hypertension        Allergies   Allergen Reactions    Morphine Hives       Past Surgical History:   Procedure Laterality Date    APPENDECTOMY      CHOLECYSTECTOMY      HYSTERECTOMY      TONSILLECTOMY         No family history on file.    Social History     Socioeconomic History    Marital status: Other   Tobacco Use    Smoking status: Every Day     Packs/day: 1.00     Types: Cigarettes    Smokeless tobacco: Never   Vaping Use    Vaping Use: Never used   Substance and Sexual Activity    Alcohol use: Never    Drug use: Never    Sexual activity: Defer           Objective   Physical Exam  Vitals and nursing note reviewed.   Constitutional:       Appearance: She is well-developed.   HENT:      Head: Normocephalic.      Right Ear: External ear normal.      Left Ear: External ear normal.   Eyes:      Conjunctiva/sclera: Conjunctivae normal.      Pupils: Pupils are equal, round, and reactive to light.   Cardiovascular:      Rate and Rhythm: Normal rate and regular rhythm.      Heart sounds: Normal heart sounds.   Pulmonary:      Effort: Pulmonary effort is  normal.      Breath sounds: Normal breath sounds.   Abdominal:      General: Bowel sounds are normal.      Palpations: Abdomen is soft.   Musculoskeletal:         General: Normal range of motion.      Cervical back: Normal range of motion and neck supple.   Skin:     General: Skin is warm and dry.      Capillary Refill: Capillary refill takes less than 2 seconds.   Neurological:      Mental Status: She is alert and oriented to person, place, and time.   Psychiatric:         Behavior: Behavior normal.         Thought Content: Thought content normal.       Procedures           ED Course                                           Medical Decision Making  Problems Addressed:  Hemorrhagic cystitis: complicated acute illness or injury    Amount and/or Complexity of Data Reviewed  Labs: ordered.  Radiology: ordered.    Risk  Prescription drug management.        Final diagnoses:   None       ED Disposition  ED Disposition       None            No follow-up provider specified.       Medication List      No changes were made to your prescriptions during this visit.          0.7 0.0 - 1.2 mg/dL    Globulin 3.3 gm/dL    A/G Ratio 1.0 g/dL    BUN/Creatinine Ratio 9.0 7.0 - 25.0    Anion Gap 10.8 5.0 - 15.0 mmol/L    eGFR 91.5 >60.0 mL/min/1.73   Urinalysis, Microscopic Only - Urine, Clean Catch    Specimen: Urine, Clean Catch   Result Value Ref Range    RBC, UA Too Numerous to Count (A) None Seen, 0-2 /HPF    WBC, UA 13-20 (A) None Seen, 0-2 /HPF    Bacteria, UA Trace (A) None Seen /HPF    Squamous Epithelial Cells, UA 0-2 None Seen, 0-2 /HPF    Hyaline Casts, UA None Seen None Seen /LPF    Methodology Manual Light Microscopy    CBC Auto Differential    Specimen: Blood   Result Value Ref Range    WBC 5.84 3.40 - 10.80 10*3/mm3    RBC 4.12 3.77 - 5.28 10*6/mm3    Hemoglobin 12.9 12.0 - 15.9 g/dL    Hematocrit 40.3 34.0 - 46.6 %    MCV 97.8 (H) 79.0 - 97.0 fL    MCH 31.3 26.6 - 33.0 pg    MCHC 32.0 31.5 - 35.7 g/dL    RDW 13.7 12.3 - 15.4 %    RDW-SD 48.8 37.0 - 54.0 fl    MPV 12.3 (H) 6.0 - 12.0 fL    Platelets 88 (L) 140 - 450 10*3/mm3    Neutrophil % 45.3 42.7 - 76.0 %    Lymphocyte % 43.2 19.6 - 45.3 %    Monocyte % 8.6 5.0 - 12.0 %    Eosinophil % 2.1 0.3 - 6.2 %    Basophil % 0.5 0.0 - 1.5 %    Immature Grans % 0.3 0.0 - 0.5 %    Neutrophils, Absolute 2.65 1.70 - 7.00 10*3/mm3    Lymphocytes, Absolute 2.52 0.70 - 3.10 10*3/mm3    Monocytes, Absolute 0.50 0.10 - 0.90 10*3/mm3    Eosinophils, Absolute 0.12 0.00 - 0.40 10*3/mm3    Basophils, Absolute 0.03 0.00 - 0.20 10*3/mm3    Immature Grans, Absolute 0.02 0.00 - 0.05 10*3/mm3    nRBC 0.0 0.0 - 0.2 /100 WBC   Hemoglobin A1c    Specimen: Blood   Result Value Ref Range    Hemoglobin A1C 9.00 (H) 4.80 - 5.60 %   TSH    Specimen: Blood   Result Value Ref Range    TSH 2.210 0.270 - 4.200 uIU/mL   Green Top (Gel)   Result Value Ref Range    Extra Tube Hold for add-ons.    Lavender Top   Result Value Ref Range    Extra Tube hold for add-on    Gold Top - SST   Result Value Ref Range    Extra Tube Hold for add-ons.    Gray Top   Result Value  Ref Range    Extra Tube Hold for add-ons.    Light Blue Top   Result Value Ref Range    Extra Tube Hold for add-ons.      CT Abdomen Pelvis Stone Protocol   Final Result       1. 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 Eyad Hemphill MD.                                              Medical Decision Making  This 51-year-old female presents emergency room complaint of hematuria x3 months.  Patient reports she does have lower back pain but states this is not unusual for her.  She denies any fever.  Patient denies nausea and vomiting.  Patient denies any alleviating or worsening factors patient denies trying interventions.    Problems Addressed:  Hemorrhagic cystitis: complicated acute illness or injury    Amount and/or Complexity of Data Reviewed  Labs: ordered. Decision-making details documented in ED Course.  Radiology: ordered. Decision-making details documented in ED Course.    Risk  Prescription drug management.        Final diagnoses:   Hemorrhagic cystitis       ED Disposition  ED Disposition       ED Disposition   Discharge    Condition   Stable    Comment   --               Bartolome Recinos MD  [address] [place], [place] [id number] [phone]    Schedule an appointment as soon as possible for a visit   For further evaluation         Medication List        New Prescriptions      nitrofurantoin (macrocrystal-monohydrate) 100 MG capsule  Commonly known as: MACROBID  Take 1 capsule by mouth 2 (Two) Times a Day for 7 days.            ASK your doctor about these medications      fluconazole 150 MG tablet  Commonly known as: DIFLUCAN  Take 1 tablet by mouth 1 (One) Time for 1 dose.  Ask about: Should I take this medication?               Where to Get Your Medications        You can get these medications from any pharmacy    Bring a paper prescription for each of these  medications  fluconazole 150 MG tablet  nitrofurantoin (macrocrystal-monohydrate) 100 MG capsule            Navid Luevano, APRN  06/08/23 [id number]

## 2023-06-04 LAB — BACTERIA SPEC AEROBE CULT: NORMAL

## 2023-06-15 ENCOUNTER — OFFICE VISIT (OUTPATIENT)
Dept: PULMONOLOGY | Facility: CLINIC | Age: 53
End: 2023-06-15
Payer: COMMERCIAL

## 2023-06-15 VITALS
HEART RATE: 89 BPM | OXYGEN SATURATION: 98 % | RESPIRATION RATE: 18 BRPM | HEIGHT: 63 IN | SYSTOLIC BLOOD PRESSURE: 124 MMHG | DIASTOLIC BLOOD PRESSURE: 70 MMHG | BODY MASS INDEX: 51.91 KG/M2 | TEMPERATURE: 98 F | WEIGHT: 293 LBS

## 2023-06-15 DIAGNOSIS — Z12.2 ENCOUNTER FOR SCREENING FOR LUNG CANCER: ICD-10-CM

## 2023-06-15 DIAGNOSIS — G47.33 OSA (OBSTRUCTIVE SLEEP APNEA): Primary | ICD-10-CM

## 2023-06-15 DIAGNOSIS — J98.4 RESTRICTIVE LUNG DISEASE: ICD-10-CM

## 2023-06-15 DIAGNOSIS — F17.210 CIGARETTE NICOTINE DEPENDENCE WITHOUT COMPLICATION: ICD-10-CM

## 2023-06-15 PROCEDURE — 99214 OFFICE O/P EST MOD 30 MIN: CPT | Performed by: NURSE PRACTITIONER

## 2023-06-15 PROCEDURE — 1159F MED LIST DOCD IN RCRD: CPT | Performed by: NURSE PRACTITIONER

## 2023-06-15 PROCEDURE — 1160F RVW MEDS BY RX/DR IN RCRD: CPT | Performed by: NURSE PRACTITIONER

## 2023-06-15 RX ORDER — ISOSORBIDE MONONITRATE 30 MG/1
TABLET, EXTENDED RELEASE ORAL EVERY 24 HOURS
COMMUNITY

## 2023-06-15 RX ORDER — ONDANSETRON HYDROCHLORIDE 4 MG/5ML
SOLUTION ORAL ONCE
COMMUNITY

## 2023-06-15 RX ORDER — GLIPIZIDE 5 MG/1
TABLET ORAL EVERY 24 HOURS
COMMUNITY

## 2023-06-15 RX ORDER — FUROSEMIDE 20 MG/1
TABLET ORAL EVERY 24 HOURS
COMMUNITY

## 2023-06-15 RX ORDER — PREGABALIN 100 MG/1
200 CAPSULE ORAL 2 TIMES DAILY
COMMUNITY

## 2023-06-15 RX ORDER — METOCLOPRAMIDE 5 MG/1
5 TABLET ORAL
COMMUNITY

## 2023-06-15 RX ORDER — APIXABAN 5 MG/1
1 TABLET, FILM COATED ORAL EVERY 12 HOURS SCHEDULED
COMMUNITY
Start: 2023-05-26

## 2023-06-15 RX ORDER — LEVETIRACETAM 500 MG/1
TABLET ORAL
COMMUNITY
Start: 2023-03-09

## 2023-06-15 RX ORDER — ROSUVASTATIN CALCIUM 20 MG/1
20 TABLET, COATED ORAL DAILY
COMMUNITY

## 2023-06-15 NOTE — PROGRESS NOTES
"Chief Complaint  Sleep Apnea    Subjective        Tiki Campos presents to [facility] PULMONARY & CRITICAL CARE MEDICINE  History of Present Illness    Ms. Campos is a 53 year old female with a medical history significant for anxiety, depression, diabetes, GERD, hyperlipidemia, hypertension and sleep apnea.    She presents today for follow up on sleep apnea.  She states that she has been doing well.  She reports that her breathing has been at baseline.  She is using albuterol on an as needed basis. She states that she is smoking about 1 1/2 ppd.  She is complaint with use of her cpap nightly.    Objective   Vital Signs:  /70   Pulse 89   Temp 98 °F (36.7 °C) (Temporal)   Resp 18   Ht 160 cm (63\")   Wt 136 kg (300 lb)   SpO2 98%   BMI 53.14 kg/m²   Estimated body mass index is 53.14 kg/m² as calculated from the following:    Height as of this encounter: 160 cm (63\").    Weight as of this encounter: 136 kg (300 lb).       Class 3 Severe Obesity (BMI >=40). Obesity-related health conditions include the following: obstructive sleep apnea, hypertension, diabetes mellitus, dyslipidemias, and GERD. Obesity is unchanged. BMI is is above average; BMI management plan is completed. We discussed portion control and increasing exercise.      Physical Exam     GENERAL APPEARANCE: Well developed, well nourished, alert and cooperative, and appears to be in no acute distress.    HEAD: normocephalic. Atraumatic.    EYES: PERRL, EOMI. Vision is grossly intact.    THROAT: Oral cavity and pharynx normal. No inflammation, swelling, exudate, or lesions.     NECK: Neck supple.  No thyromegaly.    CARDIAC: Normal S1 and S2. No S3, S4 or murmurs. Rhythm is regular.     RESPIRATORY:Bilateral air entry positive. Bilateral diminished breath sounds. No wheezing, crackles or rhonchi noted.    GI: Positive bowel sounds. Soft, nondistended, nontender.     MUSCULOSKELETAL: No significant deformity or joint " abnormality. No edema. Peripheral pulses intact. No varicosities.    NEUROLOGICAL: Strength and sensation symmetric and intact throughout.     PSYCHIATRIC: The mental examination revealed the patient was oriented to person, place, and time.     Result Review :  The following data was reviewed by: SOFY Rangel on 06/15/2023:  Common labs          6/3/2023    11:38   Common Labs   Glucose 329    BUN 7    Creatinine 0.78    Sodium 135    Potassium 4.3    Chloride 100    Calcium 8.7    Albumin 3.2    Total Bilirubin 0.7    Alkaline Phosphatase 112    AST (SGOT) 27    ALT (SGPT) 19    WBC 5.84    Hemoglobin 12.9    Hematocrit 40.3    Platelets 88    Hemoglobin A1C 9.00             Assessment and Plan   Diagnoses and all orders for this visit:    1. GIBRAN (obstructive sleep apnea) (Primary)  -     Miscellaneous DME    2. Body mass index (BMI) 50.0-59.9, adult    3. Cigarette nicotine dependence without complication  -     CT chest low dose wo; Future    4. Restrictive lung disease    5. Encounter for screening for lung cancer  -     CT chest low dose wo; Future          Mild restriction noted on PFT.  Continue albuterol every 4 hours as needed.    She is complaint with cpap and supplemental oxygen nightly.   Patient was educated on positive airway pressure treatment.  As per CMS guidelines, more than 4 hours on 70% of observed nights is considered adherence. Patient was strongly encouraged to use CPAP as much as possible during sleep as more CPAP use is equal to more benefit. Use of heated humidification in positive airway pressure treatment to improve the adherence to the device.  In case of claustrophobia, we will provide the patient cognitive behavioral therapy and desensitization. Oral appliances use will be discussed with the patient in case of mild to moderate sleep apnea or if the patient with severe disease fail positive airway pressure treatment.       The patient was extensively educated on the  consequences of untreated obstructive sleep apnea namely cardiovascular/metabolic disorder, neurocognitive deficit, daytime sleepiness, motor vehicle accidents, depression, mood disorders and reduced quality of life.  At the end of conversation, the patient voices understanding of the disease process and treatment modality.  Patient also understands the risk of untreated obstructive sleep apnea and benefit benefits of the treatment.    Counseling time was greater than 10 minutes.      Will send order for DME supplies.      She is a current smoker of 1 1/2 ppd.    Ordered LDCT for lung cancer screening.        Return in about 6 months (around 12/15/2023).  Patient was given instructions and counseling regarding her condition or for health maintenance advice. Please see specific information pulled into the AVS if appropriate.

## 2023-06-23 PROBLEM — R31.0 GROSS HEMATURIA: Status: ACTIVE | Noted: 2023-06-23

## 2023-06-23 PROBLEM — N30.91 HEMORRHAGIC CYSTITIS: Status: ACTIVE | Noted: 2023-06-23

## 2023-06-23 PROBLEM — N30.01 ACUTE CYSTITIS WITH HEMATURIA: Status: ACTIVE | Noted: 2023-06-23

## 2023-06-23 PROBLEM — R31.29 MICROHEMATURIA: Status: ACTIVE | Noted: 2023-06-23

## 2023-07-07 ENCOUNTER — TELEPHONE (OUTPATIENT)
Dept: PULMONOLOGY | Facility: CLINIC | Age: 53
End: 2023-07-07

## 2023-07-07 NOTE — TELEPHONE ENCOUNTER
Hub staff attempted to follow warm transfer process and was unsuccessful     Caller: Tiki Campos    Relationship to patient: Self    Best call back number: [phone]    Patient is needing: PATIENT CALLED BACK FROM THIS VM MESSAGE.  Telephone with Dina Smiley MA (07/07/2023)   PLEASE CALL THEM BACK TO REVIEW THE INFORMATION THEY NEED.

## 2023-08-03 ENCOUNTER — OFFICE VISIT (OUTPATIENT)
Dept: UROLOGY | Facility: CLINIC | Age: 53
End: 2023-08-03
Payer: COMMERCIAL

## 2023-08-03 VITALS
HEART RATE: 72 BPM | BODY MASS INDEX: 50.5 KG/M2 | DIASTOLIC BLOOD PRESSURE: 74 MMHG | SYSTOLIC BLOOD PRESSURE: 142 MMHG | WEIGHT: 285 LBS | HEIGHT: 63 IN

## 2023-08-03 DIAGNOSIS — G89.29 CHRONIC MIDLINE LOW BACK PAIN, UNSPECIFIED WHETHER SCIATICA PRESENT: ICD-10-CM

## 2023-08-03 DIAGNOSIS — N30.01 ACUTE CYSTITIS WITH HEMATURIA: ICD-10-CM

## 2023-08-03 DIAGNOSIS — N30.91 HEMORRHAGIC CYSTITIS: ICD-10-CM

## 2023-08-03 DIAGNOSIS — R31.0 GROSS HEMATURIA: Primary | ICD-10-CM

## 2023-08-03 DIAGNOSIS — M54.50 CHRONIC MIDLINE LOW BACK PAIN, UNSPECIFIED WHETHER SCIATICA PRESENT: ICD-10-CM

## 2023-08-03 LAB
BILIRUB BLD-MCNC: ABNORMAL MG/DL
CLARITY, POC: ABNORMAL
COLOR UR: ABNORMAL
EXPIRATION DATE: ABNORMAL
GLUCOSE UR STRIP-MCNC: ABNORMAL MG/DL
KETONES UR QL: NEGATIVE
LEUKOCYTE EST, POC: ABNORMAL
Lab: ABNORMAL
NITRITE UR-MCNC: NEGATIVE MG/ML
PH UR: 6 [PH] (ref 5–8)
PROT UR STRIP-MCNC: ABNORMAL MG/DL
RBC # UR STRIP: ABNORMAL /UL
SP GR UR: 1.02 (ref 1–1.03)
UROBILINOGEN UR QL: ABNORMAL

## 2023-08-03 PROCEDURE — 87086 URINE CULTURE/COLONY COUNT: CPT | Performed by: NURSE PRACTITIONER

## 2023-08-04 LAB — BACTERIA SPEC AEROBE CULT: NO GROWTH

## 2023-08-09 ENCOUNTER — TELEPHONE (OUTPATIENT)
Dept: UROLOGY | Facility: CLINIC | Age: 53
End: 2023-08-09
Payer: COMMERCIAL

## 2023-08-09 NOTE — TELEPHONE ENCOUNTER
I left the patient a voicemail stating that her urine culture was negative for any bacterial growth and to follow up in the clinic as discussed. I also mentioned that antibiotics aren't needed at this time.

## 2024-01-08 ENCOUNTER — TELEPHONE (OUTPATIENT)
Dept: UROLOGY | Facility: CLINIC | Age: 54
End: 2024-01-08

## 2024-01-08 ENCOUNTER — OFFICE VISIT (OUTPATIENT)
Dept: UROLOGY | Facility: CLINIC | Age: 54
End: 2024-01-08
Payer: COMMERCIAL

## 2024-01-08 VITALS
SYSTOLIC BLOOD PRESSURE: 126 MMHG | DIASTOLIC BLOOD PRESSURE: 72 MMHG | HEIGHT: 63 IN | BODY MASS INDEX: 48.73 KG/M2 | WEIGHT: 275 LBS | HEART RATE: 102 BPM

## 2024-01-08 DIAGNOSIS — N30.91 HEMORRHAGIC CYSTITIS: ICD-10-CM

## 2024-01-08 DIAGNOSIS — R31.0 GROSS HEMATURIA: Primary | ICD-10-CM

## 2024-01-08 DIAGNOSIS — G89.29 RIGHT FLANK PAIN, CHRONIC: ICD-10-CM

## 2024-01-08 DIAGNOSIS — N30.01 ACUTE CYSTITIS WITH HEMATURIA: ICD-10-CM

## 2024-01-08 DIAGNOSIS — R10.9 RIGHT FLANK PAIN, CHRONIC: ICD-10-CM

## 2024-01-08 DIAGNOSIS — Z80.52 FAMILY HISTORY OF BLADDER CANCER: ICD-10-CM

## 2024-01-08 DIAGNOSIS — R31.29 MICROHEMATURIA: ICD-10-CM

## 2024-01-08 LAB
BILIRUB BLD-MCNC: ABNORMAL MG/DL
CLARITY, POC: ABNORMAL
COLOR UR: ABNORMAL
EXPIRATION DATE: ABNORMAL
GLUCOSE UR STRIP-MCNC: ABNORMAL MG/DL
KETONES UR QL: ABNORMAL
LEUKOCYTE EST, POC: ABNORMAL
Lab: ABNORMAL
NITRITE UR-MCNC: POSITIVE MG/ML
PH UR: 5.5 [PH] (ref 5–8)
PROT UR STRIP-MCNC: ABNORMAL MG/DL
RBC # UR STRIP: ABNORMAL /UL
SP GR UR: 1.01 (ref 1–1.03)
UROBILINOGEN UR QL: ABNORMAL

## 2024-01-08 PROCEDURE — 99214 OFFICE O/P EST MOD 30 MIN: CPT | Performed by: NURSE PRACTITIONER

## 2024-01-08 PROCEDURE — 1159F MED LIST DOCD IN RCRD: CPT | Performed by: NURSE PRACTITIONER

## 2024-01-08 PROCEDURE — 87086 URINE CULTURE/COLONY COUNT: CPT | Performed by: NURSE PRACTITIONER

## 2024-01-08 PROCEDURE — 1160F RVW MEDS BY RX/DR IN RCRD: CPT | Performed by: NURSE PRACTITIONER

## 2024-01-08 RX ORDER — ESCITALOPRAM OXALATE 20 MG/1
20 TABLET ORAL DAILY
COMMUNITY
Start: 2023-12-29

## 2024-01-08 NOTE — TELEPHONE ENCOUNTER
Caller: Tiki Campos    Relationship to patient: Self    Best call back number: [phone]    Patient is needing: RECEIVED A CALL FROM PT. C/O RIGHT SIDE CONSTANT SEVERE PAIN BY HER RIGHT KIDNEY AND A LOT OF BLOOD IN URINE. SX STARTED YESTERDAY WITH SEVERE PAIN AND BLOOD IN URINE. OFFICE PLEASE CALL PT BACK WITH THESE REGARDS. SHE FEELS LIKE SHE NEEDS TO SEE SOMEONE AS SOON AS POSSIBLE.  THANK YOU.

## 2024-01-08 NOTE — PROGRESS NOTES
Chief Complaint  Recurrent Uti's, Gross hematuria (3 MONTH FOLLOW UP FOR CYSTITIS/HEMATURIA/FLANK/ABD PAIN), and Flank Pain    Subjective          Tiki Campos presents to [facility] for gross hematuria/acute cystitis with hematuria  History of Present Illness    Ms. Tiki Campos YOB: 1970.  Ms. Tiki Campos  is a pleasant 53-year-old female who returns to clinic today for evaluation. This is a 5-month follow-up with prior complaints of gross hematuria, acute cystitis, and overactive bladder/detrusor instability complicated by bouts of urinary frequency, urgency, and burning with urination.     Last evaluated in clinic on 08/03/2024. Patient was post a lower tract investigation by Dr. Rapp secondary to her bouts of gross hematuria. She has had ongoing hematuria for about 1 year, intermittent with bouts of GROSS hematuria. Patient does have abdominal and flank pain. She has chronic back pain, pelvic pressure, and suprapubic discomfort.     Nevertheless, she has had negative urine cultures. She has also had multiple CT scans of the abdomen and pelvis which have been unremarkable with the source of her hematuria not noted on her imaging. One of her CTs did indicate probable cirrhosis with varices. Nevertheless, no ascites was noted at that time. She has had large to moderate stool burden consistent with chronic constipation. There was no mass in her kidneys or any obstructive uropathy.     SHE Also had a recent renal ultrasound completed which was unremarkable for kidney stones. There was no hydronephrosis noted. PER Dr. Rapp. Anesthetic cystoscopy/retrograde pyelogram procedure completed on 07/03/2023, THE Patient did have a normal urethra, normal bladder with no stones and no tumors. There were no foreign bodies also noted. She did have adequate retrograde on both ureters which was perfectly normal. He was unable to determine the etiology  of her gross hematuria on her exam.     We had recommended GI follow-up for her abdominal pain and discomfort and also concerns of cirrhosis in her liver. She returns to clinic today for reevaluation and is in apparent discomfort. Patient reports her bouts of gross hematuria REstarted yesterday. Her urinalysis TODAY 01/08/23 is dark brown blood. She has had right flank pain greater than left sided flank pain in the past. We had discussed possible causes of her gross hematuria such as her blood thinners with Eliquis. Nevertheless, patient reported her HEmaturia episodes had started prior to even taking Eliquis on 06/2023. She did also have a low platelet count down to 60s for which she was recommended to follow up with hematology.    OVERALL, The patient states she is  NOT doing well. She states the episode of gross hematuria started yesterday. She states it quit for approximately 3 to 4 weeks and it started again yesterday. She denies any straining or PULLED BACK MUSCLES. She states she has not had sex in almost 5 years. She states she has pain in her right flank. She states she fell and broke her foot on 12/23/2023. She states she is still taking her Eliquis. She states she has an appointment with hematology on 03/18/2024. She states she has never had a UTI. She states she has noticed clots in her urine.    The patient states her older sister was diagnosed with bladder cancer in 09/2023, AND MAKES HER very worries we are missing something.She states her mother had kidney disease and had her right kidney removed when she was 18 years old.    Renal ultrasound completed 06/12/[id number] with no renal mass, no hydronephrosis noted.    IMPRESSION CT ABD/PELVIS 06/03/02:  1. Probable cirrhosis with varices, but no ascites at this time  2.Large stool burden  3. Bladder: No focal mass or significant wall thickening   4. Kidneys/ureters: No mass. No obstructive uropathy.  No evidence of  urolithiasis.    "  Active Ambulatory Problems     Diagnosis Date Noted    Hypotension 02/10/2020    Cigarette nicotine dependence without complication 06/01/2020    GIBRAN (obstructive sleep apnea) 05/20/2021    Aphasia 05/20/2021    Gross hematuria 06/23/2023    Microhematuria 06/23/2023    Acute cystitis with hematuria 06/23/2023    Hemorrhagic cystitis 06/23/2023    Chronic midline low back pain 08/03/2023    Family history of bladder cancer 01/08/2024    Right flank pain, chronic 01/08/2024     Resolved Ambulatory Problems     Diagnosis Date Noted    Respiratory failure 03/17/2020    Chronic obstructive pulmonary disease 05/20/2021     Past Medical History:   Diagnosis Date    A-fib     Anxiety     Arthritis     Cirrhosis     Depression     Diabetes mellitus     Elevated cholesterol     Gastroparesis     GERD (gastroesophageal reflux disease)     Hematuria     History of transfusion     Hyperlipidemia     Hypertension     Implantable loop recorder present     Neuropathy     PONV (postoperative nausea and vomiting)     Sleep apnea, obstructive 2003      Objective   Vital Signs:   /72   Pulse 102   Ht 160 cm (63\")   Wt 125 kg (275 lb)   BMI 48.71 kg/m²       ROS:   Review of Systems   Constitutional:  Positive for activity change, appetite change, fatigue and unexpected weight gain. Negative for chills, diaphoresis, fever and unexpected weight loss.   HENT:  Negative for congestion, ear discharge, ear pain, nosebleeds, rhinorrhea, sinus pressure and sore throat.    Eyes:  Negative for blurred vision, double vision, photophobia, pain, redness and visual disturbance.   Respiratory:  Negative for apnea, cough, chest tightness, shortness of breath, wheezing and stridor.    Cardiovascular:  Negative for chest pain and palpitations.   Gastrointestinal:  Positive for abdominal distention, abdominal pain and constipation. Negative for diarrhea, nausea and vomiting.   Endocrine: Negative for polydipsia, polyphagia and polyuria. "   Genitourinary:  Positive for flank pain, frequency, hematuria, pelvic pain and pelvic pressure. Negative for decreased urine volume, difficulty urinating, dyspareunia, dysuria, genital sores, urgency, urinary incontinence and vaginal discharge.   Musculoskeletal:  Positive for back pain. Negative for arthralgias and joint swelling.   Skin:  Positive for color change, dry skin and pallor. Negative for rash and wound.   Neurological:  Negative for dizziness, tremors, syncope, weakness, light-headedness, headache, memory problem and confusion.   Hematological:  Bruises/bleeds easily.   Psychiatric/Behavioral:  Positive for sleep disturbance and stress. Negative for behavioral problems and decreased concentration.         Physical Exam  Constitutional:       General: She is in acute distress.      Appearance: She is well-developed. She is obese. She is ill-appearing.   HENT:      Head: Normocephalic and atraumatic.   Eyes:      Pupils: Pupils are equal, round, and reactive to light.   Neck:      Thyroid: No thyromegaly.      Trachea: No tracheal deviation.   Cardiovascular:      Rate and Rhythm: Normal rate and regular rhythm.      Heart sounds: No murmur heard.  Pulmonary:      Effort: Pulmonary effort is normal. No respiratory distress.      Breath sounds: Normal breath sounds. No stridor. No wheezing.   Abdominal:      General: Bowel sounds are normal. There is distension.      Palpations: Abdomen is soft.      Tenderness: There is abdominal tenderness.   Genitourinary:     Labia:         Right: No tenderness.         Left: No tenderness.       Vagina: Normal. No vaginal discharge.      Comments: Interstitial cystitis/detrusor instability with urine frequency, urgency, dysuria, gross hematuria  Musculoskeletal:         General: Tenderness present. No deformity. Normal range of motion.      Cervical back: Normal range of motion.   Skin:     General: Skin is warm and dry.      Capillary Refill: Capillary refill takes  less than 2 seconds.      Coloration: Skin is pale.      Findings: No erythema or rash.   Neurological:      Mental Status: She is alert and oriented to person, place, and time.      Cranial Nerves: No cranial nerve deficit.      Sensory: No sensory deficit.      Motor: Weakness present.      Coordination: Coordination normal.   Psychiatric:         Behavior: Behavior normal.         Thought Content: Thought content normal.         Judgment: Judgment normal.        Result Review :     UA          6/23/2023    14:08 8/3/2023    11:34 1/8/2024    13:15   Urinalysis   Ketones, UA Negative  Negative  Trace    Leukocytes, UA Trace  Small (1+)  Moderate (2+)      Urine Culture          6/23/2023    14:08 8/3/2023    11:34 1/8/2024    13:13   Urine Culture   Urine Culture No growth  No growth  No growth        Assessment and Plan      - She will follow up in 2 weeks to review CT and urine sent off for cytology. Also sent off for urine culture.  - She will continue conservative therapy for right now.  - Follow up in clinic as discussed.    ADDENDUM 01/15/23  REVIEWED CT RESULTS -IMPRESSION:  1. 5 mm left UPJ stone without significant hydronephrosis.  SCHEDULED FOR LEFT ESWL 01/26/23 WITH DR. AMOR    DIAGNOSIS:  URINE, CLEAN CATCH: Negative for high-grade urothelial carcinoma  MICROSCOPIC DESCRIPTION:  Scattered urothelial and squamous epithelial cells are present.        Problem List Items Addressed This Visit          Family History    Family history of bladder cancer    Relevant Orders    CT Abdomen Pelvis With & Without Contrast (Completed)       Gastrointestinal Abdominal     Right flank pain, chronic    Relevant Orders    CT Abdomen Pelvis With & Without Contrast (Completed)       Genitourinary and Reproductive     Gross hematuria - Primary    Overview     Added automatically from request for surgery [id number]         Relevant Orders    POC Urinalysis Dipstick, Automated (Completed)    Urine Culture - Urine, Urine,  Random Void (Completed)    NON-GYN CYTOLOGY, [facility] ([facility] only) (Completed)    CT Abdomen Pelvis With & Without Contrast (Completed)    Microhematuria    Overview     Added automatically from request for surgery [id number]         Relevant Orders    CT Abdomen Pelvis With & Without Contrast (Completed)    Acute cystitis with hematuria    Overview     Added automatically from request for surgery [id number]         Relevant Orders    CT Abdomen Pelvis With & Without Contrast (Completed)    Hemorrhagic cystitis    Overview     Added automatically from request for surgery [id number]         Relevant Orders    CT Abdomen Pelvis With & Without Contrast (Completed)                                  ASSESSMENT  GROSS HEMATURIA/ACUTE CYSTITIS/BACK PAIN/CYSTITIS  Mrs. Tiki Campos is a pleasant 53-year-old female who returns to clinic today for evaluation. This is a 16 month follow-up with prior complaints of gross hematuria that had been ongoing for over 6 months prior to  initial evaluation. The Patient also reported numerous other complaints with acute cystitis, bilateral flank pain, she has chronic back pain, abdominal discomfort.      She is post unremarkable lower tract investigation via anesthetic cystoscopy with negative retrograde pyelogram Dr. Rapp, 07/03 of 2023 -however patient reports her hematuria episodes have persisted.  Her urine today is gross hematuria dark brown, her last urine dipstick today showed 1+ leukocyte esterase otherwise negative nitrites, negative gross but showed 3+ microscopic hematuria.  Her PVR is 0 mL, last urine culture was complete negative for any bacterial growth.  Urine Culture No growth                08/03/2023        Hematuria: AGAIN,  Discussed Microscopic VS Gross Hematuria with patient. SHe has been very symptomatic and has some gross hematuria TODAY. We discussed the possible causes such as infection in the bladder, kidney, or bladder discomfort, trauma, kidney stones,  different kinds of cancers, vigorous exercise, viral illness, such as hepatitis a virus that causes liver disease and inflammation of the liver and sexual activity.     We discussed the significance of microscopic hematuria versus gross hematuria.  We discussed the presence or absence of the type of clotting identified including vermiform clots consistent with ureteral bleeding versus just pink tinged urine versus hemant clots.  We discussed the presence of urokinase in the urine which causes the clots to dissolve with time.  We discussed the fact that it takes only a very small amount of blood in the urine to make the urine very red appearing and therefore give one the impression that there is much more blood loss that is really present.      RECURRENT UTIs/HEMORRHAGIC CYSTITIS- PATIENT DENIES ANY CURRENT ISSUES WITH INFECTIONS. HOWEVER, We discussed the types of organisms that are found in the urinary tract indicating that the vast majority are results of the patient's own gastrointestinal sita. We discussed the risk factors for recurrent infections being intercourse in younger patients and atrophic changes in older patients. We discussed the symptoms that are found including pain, pressure, burning, frequency, urgency suprapubic pain and painful intercourse.  I discussed upper tract symptoms including fevers, chills, and indicated the workup would be much more aggressive if the patient were to present with recurrent infections in the face of upper tract symptomatology such as fever.  I discussed the history of vesicoureteral reflux in young patients and finally chronic renal scarring as a result of such.      IBS- Patient has significant irritable bowel syndrome, characterized by extreme amounts of constipation.  We spoke about the impact of this on bladder function.  We spoke about  its relationship to recurrent urinary tract infections. We discussed the need for increasing p.o. fluid intake to at least 2 to 3 L  of water daily, and discussed the physiology of colonic motility as well as use of MiraLAX as a bulk laxative versus the newer class of serotonin uptake blockers such as Linzess.  We stressed the need for a daily bowel movement and discussed the [place] stool scale at length. We also discussed a referral to the GI nurse practitioner                                                      PLAN  WILL REPEAT CT RENAL MASS PROTOCOL -STAT DUE TO ONGOING -IMPRESSION:  1. 5 mm left UPJ stone without significant hydronephrosis.  SCHEDULED FOR LEFT ESWL ON 01/26/26 WITH DR. AMOR     -GROSS HEMATURIA-ONGOING, SOURCE OF HEMATURIA STILL NOT NOTED DURING EXAM-ANESTHETIC CYSTO / RETROGRADE PYELOGRAM STUDY / BLADDER HYDRO DISTENTION PROCEDURE ON  07/03/23 WITH DR AMOR UNREMARKABLE     We will resend her urine for culture, I will call her with results if any positive bacterial growth.    Will resend her urine for Cytology-FISH study    WE discussed Restarting Antibiotic Therapy ONLY IF POSITIVE if Bacteria      I recommend concomitant probiotics with treatment with antibiotics to protect the rectal reservoir including over-the-counter yogurt preparations to hemant oral pills containing the appropri.ate probiotics.     We discussed treatment options for HER BACK/FLANK pain with patient encouraged to continue conservative therapy alternating NSAID/Tylenol as tolerated, Also including hot baths, showers, warm compresses to lower back as tolerated. Also encouraged walking, physical therapy, light exercises as tolerated     Rest is the most important treatment in the case of BACK/FLANK pain. Rest and physical therapy are enough to improve minor pain. Discussed to monitor his daily routine with prevention of flank pain by: At least Drinking 8 glasses of water per day, Limiting your alcohol consumption.       Having a healthy diet containing fruits, vegetables, and a lot of fluids, Practicing safe sex.  Also maintaining proper hygiene  of your body as well as the environment.     FOLLOW UP WITH GI-ABDOMINAL PAIN, IBS-C     PT WILL SEE PCP FOR HEMATOLOGY REFERRAL WITH  [facility] OF PLATELETS-88     Encourage INCREASING HER FLUID INTAKE TO 1-2 LITRE'S, Avoiding any bladder irritants such as caffiene, citrusy, spicy foods      We discussed the use of both an upper and lower tract investigation.  I discussed the fact that an upper tract investigation includes a  CT scan with contrast being the gold standard to diagnose the small neoplasms-patient HAD CT 06/03/23-NEGATIVE      Finally,   WE Discussed the fact that there is about a 96% chance of a negative workup with episodes of  microscopic hematuria and with much greater in the face of Gross hematuria.  We discussed the fact that this is a non-cumulative test.  In other words if there is hematuria next year I would recommend continuing to work up the condition because of the fact that neoplasms may be small at the first workup and easily are missed.   We discussed the fact that if there is any history of chronic kidney disease or risk factors such as diabetes for contrast a noncontrasted study will be utilized.  We will initiate an investigation.      We Also discussed obtaining a CT urogram for her microhematuria if it persists.     If the patient's microscopic hematuria work-up is negative, per AUA guidelines I would recommend a repeat urinalysis via the patient's primary care provider in 12 months.  If the repeat urinalysis is positive, the patient and I will then need to have a shared decision-making conversation regarding further work-up versus observation, given the low likelihood of identifying etiology in this situation.  If patient were to develop gross hematuria in the interim, the patient would need a total re-evaluation.             Follow-up with patient in clinic AS DISCUSSED     MAY RETURN SOONER IF NEED BE, OR     GO TO ER IF WORSENING BOUTS OF GROSS HEMATURIA/DYSURIA     Patient is  agreeable plan of care.    Patient reports that she is not currently experiencing any symptoms of urinary incontinence.    RADIOLOGY (CT AND/OR KUB):    CT Abdomen and Pelvis: No results found for this or any previous visit.     CT Stone Protocol: Results for orders placed during the hospital encounter of 06/03/23    CT Abdomen Pelvis Stone Protocol    Narrative  EXAM: CT ABDOMEN PELVIS STONE PROTOCOL-      TECHNIQUE: Multiple axial CT images were obtained from lung bases  through pubic symphysis WITHOUT administration of IV contrast.  Reformatted images in the coronal and/or sagittal plane(s) were  generated from the axial data set to facilitate diagnostic accuracy  and/or surgical planning.  Oral Contrast:NONE.    Radiation dose reduction techniques were utilized per ALARA protocol.  Automated exposure control was initiated through either or [facility] or  [employer] software packages by  protocol.  DOSE:    Clinical information Flank pain, kidney stone suspected    Comparison 03/17/2020    FINDINGS:    Lower thorax: Clear. No effusions.    Abdomen:    Liver: Nodular appearance of the liver    Gallbladder: Surgically absent    Pancreas: Unremarkable. No mass or ductal dilatation.    Spleen: Homogeneous. No splenomegaly.    Adrenals: No mass.    Kidneys/ureters: No mass. No obstructive uropathy.  No evidence of  urolithiasis.    GI tract: Large stool burden. There is no evidence of appendicitis    MESENTERY: No free fluid, walled off fluid collections, mesenteric  stranding, or enlarged lymph nodes      Vasculature: Evidence of atherosclerotic vascular disease    Abdominal wall: No focal hernia or mass.      Bladder: No focal mass or significant wall thickening    Reproductive: Unremarkable as visualized    Bones: Arthritic change in the spine    Impression  1. Probable cirrhosis with varices, but no ascites at this time    2.Large stool burden    This report was finalized on 6/3/2023 11:54 AM by   Eyad Hemphill MD.     KUB: No results found for this or any previous visit.       LABS (3 MONTHS):    Office Visit on 2024   Component Date Value Ref Range Status    Color 2024 Red (A)  Yellow, Straw, Dark Yellow, Nunu Final    Clarity, UA 2024 Cloudy (A)  Clear Final    Specific Gravity  2024 1.015  1.005 - 1.030 Final    pH, Urine 2024 5.5  5.0 - 8.0 Final    Leukocytes 2024 Moderate (2+) (A)  Negative Final    Nitrite, UA 2024 Positive (A)  Negative Final    Protein, POC 2024 2+ (A)  Negative mg/dL Final    Glucose, UA 2024 3+ (A)  Negative mg/dL Final    Ketones, UA 2024 Trace (A)  Negative Final    Urobilinogen, UA 2024 4 E.U./dL (A)  Normal, 0.2 E.U./dL Final    Bilirubin 2024 1 mg/dL (A)  Negative Final    Blood, UA 2024 3+ (A)  Negative Final    Lot Number 2024 n   Final    Expiration Date 2024 n   Final    Urine Culture 2024 No growth   Final    Reference Lab Report 2024    Final                    Value:[facility]  [address] [place] [address]  Phone: [phone] or [phone]  Fax: [phone]  Gopi Henry M.D., Medical Director    PATIENT NAME                                 LABORATORY NO.  DIANA BURNS                            [id number]  [id number]                                     AGE                SEX     SSN            CLIENT REF #  [facility]                  53       1970   F       [id number]    [id number]  [address] [facility] [address]                        REQUESTING M.D.       ATTENDING M.D..        [place] [address]                               CHENG AKWA, GRISELDA  DATE COLLECTED        DATE RECEIVED          DATE REPORTED  2024    DIAGNOSIS:  URINE, CLEAN CATCH:  Negative for high-grade urothelial carcinoma    MICROSCOPIC  DESCRIPTION:  Scattered urothelial and squamous epithelial cells are present.    Professional                           interpretation rendered by Elena Sainz D.O., JACKLYN at [employer], [employer], [address] [place] [address].    CLINICAL HISTORY:  Gross hematuria    SPECIMENS SUBMITTED:  URINE, CLEAN CATCH    GROSS SPECIMEN DESCRIPTION:  100cc of cloudy red fluid without sediment received in fixative    CYTOTECHNOLOGIST:         SHASHI ARTEAGA (ASCP)                       REVIEWED, DIAGNOSED AND ELECTRONICALLY  SIGNED BY:    Elena Sainz D.O., JACKLYN  CPT CODES:  99001            Smoking Cessation Counseling:  Current every day smoker. less than 3 minutes spent counseling. Will try to cut down.  I advised patient to quit tobacco use and offered support.       Follow Up   Return in about 2 weeks (around 1/22/2024) for Next scheduled follow up, GROSS/MICRO HEMATURIA, RECURRENT UTI/DYSURIA/OAB/DI- DISCUSS CYSTO EVAL.    Patient was given instructions and counseling regarding her condition or for health maintenance advice. Please see specific information pulled into the AVS if appropriate.          This document has been electronically signed by Griselda Cheng-Akwa, APRN   January 15, 2024 10:26 EST      Dictated Utilizing Dragon Dictation: Part of this note may be an electronic transcription/translation of spoken language to printed text using the Dragon Dictation System.    Transcribed from ambient dictation for Griselda Cheng-Akwa, APRN by Kathrin Munoz.  01/08/24   16:12 EST    Patient or patient representative verbalized consent to the visit recording.  I have personally performed the services described in this document as transcribed by the above individual, and it is both accurate and complete.

## 2024-01-09 LAB
BACTERIA SPEC AEROBE CULT: NO GROWTH
REF LAB TEST METHOD: NORMAL

## 2024-01-10 ENCOUNTER — TELEPHONE (OUTPATIENT)
Dept: UROLOGY | Facility: CLINIC | Age: 54
End: 2024-01-10
Payer: COMMERCIAL

## 2024-01-10 NOTE — TELEPHONE ENCOUNTER
----- Message from Griselda Cheng-Akwa, APRN sent at 1/9/2024  4:13 PM EST -----  Please let patient know her urine culture results again negative for any bacterial infection at this time.  So no antibiotics needed.    Also the urine was sent off for cytology came back negative for any kind of cancer.  DIAGNOSIS:   URINE, CLEAN CATCH:  Negative for high-grade urothelial carcinoma     Still pending her CT scan results.  She will follow-up in clinic as we discussed.  So far no need to worry.    Thank you

## 2024-01-10 NOTE — TELEPHONE ENCOUNTER
Called patient to go over her urine culture and cystology results, all negative. Patient verbalized understanding.

## 2024-01-12 ENCOUNTER — HOSPITAL ENCOUNTER (OUTPATIENT)
Dept: CT IMAGING | Facility: HOSPITAL | Age: 54
Discharge: HOME | End: 2024-01-12
Admitting: NURSE PRACTITIONER
Payer: COMMERCIAL

## 2024-01-12 LAB — CREAT BLDA-MCNC: 0.8 MG/DL (ref 0.6–1.3)

## 2024-01-12 PROCEDURE — 25510000001 IOPAMIDOL 61 % SOLUTION: Performed by: NURSE PRACTITIONER

## 2024-01-12 PROCEDURE — 74178 CT ABD&PLV WO CNTR FLWD CNTR: CPT

## 2024-01-12 PROCEDURE — 82565 ASSAY OF CREATININE: CPT

## 2024-01-12 RX ADMIN — IOPAMIDOL 100 ML: 612 INJECTION, SOLUTION INTRAVENOUS at 14:14

## 2024-01-15 ENCOUNTER — TELEPHONE (OUTPATIENT)
Dept: UROLOGY | Facility: CLINIC | Age: 54
End: 2024-01-15
Payer: COMMERCIAL

## 2024-01-15 NOTE — TELEPHONE ENCOUNTER
Called patient to check on her post clinic visit, and to discuss lab results negative for high-grade urothelial carcinoma at this time, and CT scan results which are remarkable for a left 5 mm UPJ stone.  Has had ongoing gross hematuria since June 2023 progressive.    We discussed scheduling for left ESWL at this time and patient is agreeable.  She has been scheduled for 01/26/2020 through Dr. Rapp.    DIAGNOSIS:URINE, CLEAN CATCH: Negative for high-grade urothelial carcinoma      FINDINGS CT ABD/PELVIS:  ABDOMEN: The lung bases are clear are clear. The heart is normal in  size. There is a nodular contour to the liver consistent with cirrhosis.  There are a few small subcentimeter hypodensities in the right lobe of  the liver which are unchanged. The patient is status post  cholecystectomy. The spleen is unremarkable. No adrenal mass is present.   The pancreas is unremarkable. Precontrast images reveal small bilateral  nonobstructing renal stones. There is no hydronephrosis. There is a 5 mm  left UPJ stone. Postcontrast images reveal normal enhancement of the  kidneys with no cystic or solid renal mass. The aorta is normal in  caliber. The mesenteric vasculature is patent. There is no free fluid or  adenopathy. The small bowel is unremarkable with no evidence of  obstruction.     PELVIS: The appendix is normal. The colon is unremarkable with no wall  thickening or focal mass. The urinary bladder is unremarkable. The  patient is status post hysterectomy. There is no significant free fluid  or adenopathy. Bone windows reveal no lytic or destructive lesions.     IMPRESSION:  1. 5 mm left UPJ stone without significant hydronephrosis.  2. Findings consistent with cirrhosis.

## 2024-10-24 ENCOUNTER — OFFICE VISIT (OUTPATIENT)
Dept: PULMONOLOGY | Facility: CLINIC | Age: 54
End: 2024-10-24
Payer: COMMERCIAL

## 2024-10-24 VITALS
WEIGHT: 282 LBS | DIASTOLIC BLOOD PRESSURE: 70 MMHG | OXYGEN SATURATION: 98 % | HEART RATE: 74 BPM | HEIGHT: 63 IN | BODY MASS INDEX: 49.96 KG/M2 | TEMPERATURE: 97.8 F | SYSTOLIC BLOOD PRESSURE: 106 MMHG

## 2024-10-24 DIAGNOSIS — F17.210 CIGARETTE NICOTINE DEPENDENCE WITHOUT COMPLICATION: ICD-10-CM

## 2024-10-24 DIAGNOSIS — Z12.2 ENCOUNTER FOR SCREENING FOR LUNG CANCER: ICD-10-CM

## 2024-10-24 DIAGNOSIS — G47.33 OSA (OBSTRUCTIVE SLEEP APNEA): Primary | ICD-10-CM

## 2024-10-24 DIAGNOSIS — E66.01 MORBID OBESITY WITH BMI OF 45.0-49.9, ADULT: ICD-10-CM

## 2024-10-24 PROCEDURE — 99214 OFFICE O/P EST MOD 30 MIN: CPT | Performed by: NURSE PRACTITIONER

## 2024-10-24 PROCEDURE — 1159F MED LIST DOCD IN RCRD: CPT | Performed by: NURSE PRACTITIONER

## 2024-10-24 PROCEDURE — 1160F RVW MEDS BY RX/DR IN RCRD: CPT | Performed by: NURSE PRACTITIONER

## 2024-10-24 NOTE — PROGRESS NOTES
"Chief Complaint  Sleep Apnea    Subjective          Tiki Campos presents to [facility] for   History of Present Illness    Ms. Campos is a 54 year old female with a medical history significant for atrial fibrillation, anxiety, arthritis, depression, diabetes, GERD, hyperlipidemia, hypertension and sleep apnea.    She presents today for follow up on sleep apnea.  She is compliant with use of cpap and supplemental oxygen at night.  She notes benefit from use. She continues to use albuterol as needed.      Objective   Vital Signs:   /70   Pulse 74   Temp 97.8 °F (36.6 °C)   Ht 160 cm (62.99\")   Wt 128 kg (282 lb)   SpO2 98%   BMI 49.97 kg/m²         Physical Exam    GENERAL APPEARANCE: Well developed, well nourished, alert and cooperative, and appears to be in no acute distress.    HEAD: normocephalic. Atraumatic.    EYES: PERRL, EOMI. Vision is grossly intact.    THROAT: Oral cavity and pharynx normal. No inflammation, swelling, exudate, or lesions.     NECK: Neck supple.  No thyromegaly.    CARDIAC: Normal S1 and S2. No S3, S4 or murmurs. Rhythm is regular.     RESPIRATORY:Bilateral air entry positive. No wheezing, crackles or rhonchi noted.    GI: Positive bowel sounds. Soft, nondistended, nontender.     MUSCULOSKELETAL: No significant deformity or joint abnormality. No edema. Peripheral pulses intact. No varicosities.    NEUROLOGICAL: Strength and sensation symmetric and intact throughout.     PSYCHIATRIC: The mental examination revealed the patient was oriented to person, place, and time.       Estimated body mass index is 49.97 kg/m² as calculated from the following:    Height as of this encounter: 160 cm (62.99\").    Weight as of this encounter: 128 kg (282 lb).        Result Review :   The following data was reviewed by: SOFY Rangel on 10/24/2024:  Common labs          1/12/2024    14:01 6/28/2024    12:39 10/16/2024    16:16 " "  Common Labs   Creatinine 0.80      EGFR  Am  88        WBC   6.33       Hemoglobin   14.3       Hematocrit   42.7       Platelets   107          Details          This result is from an external source.                  PFT:21    Flow volume loop was assessed.  Spirometry shows no obstruction.  There is no significant bronchodilator response.  Diffusing capacity, uncorrected for hemoglobin, is normal.  Lung volumes shows mild restrictive ventilatory defect.       Low dose lung cancer screenin23    FINDINGS:    Lungs and pleural spaces:  No suspicious pulmonary nodules identified.   No pneumothorax.  No significant effusion.    Heart:  Unremarkable.  No cardiomegaly.  No significant pericardial  effusion.  No significant coronary artery calcifications.    Bones/joints:  Degenerative changes thoracic spine are stable.  No  acute fracture.  No dislocation.    Soft tissues:  Unremarkable.    Vasculature:  Unremarkable.  No thoracic aortic aneurysm.    Lymph nodes:  Unremarkable.  No enlarged lymph nodes.    Liver:  Fatty infiltration of liver with cirrhosis changes identified.    Spleen:  Splenic enlargement, stable.    Tubes, lines and devices:  Loop recorder device noted.     IMPRESSION:     1.  Lung-RADS score: 1 - Negative.  Recommend continued annual screening  with low-dose CT (LDCT) in 12 months.  2. Other incidental and nonacute findings detailed above which appear  stable from previous.     This report was finalized on 2023 1:22 PM by Dr. Mika Vasquez MD.       Previous chest imaging:NA    Alpha-1 antitrypsin screening:NA    STOP-Bang Score:   NA  [place] Sleepiness Scale:   NA      ABG:    pH No results found for: \"PHART\"   pO2 No results found for: \"PO2ART\"   pCO2 No results found for: \"NMV5HQL\"   HCO3 No results found for: \"BLS2IXM\"                      Assessment and Plan    Problem List Items Addressed This Visit          Sleep    GIBRAN (obstructive sleep apnea) - Primary       " Tobacco    Cigarette nicotine dependence without complication    Relevant Orders    CT chest low dose wo     Other Visit Diagnoses       Encounter for screening for lung cancer        Relevant Orders    CT chest low dose wo    Morbid obesity with BMI of 45.0-49.9, adult                Tiki Campos  reports that she has been smoking cigarettes. She started smoking about 43 years ago. She has a 86.4 pack-year smoking history. She has never used smokeless tobacco. I have educated her on the risk of diseases from using tobacco products such as cancer, COPD, and heart disease.     I advised her to quit and she is not willing to quit.        Continue albuterol every 4 hours as needed.    Ordered low dose CT Chest for lung cancer screening.    She is compliant with use of supplemental oxygen and cpap at night.     Patient was educated on positive airway pressure treatment.  As per CMS guidelines, more than 4 hours on 70% of observed nights is considered adherence. Patient was strongly encouraged to use CPAP as much as possible during sleep as more CPAP use is equal to more benefit. Use of heated humidification in positive airway pressure treatment to improve the adherence to the device.  In case of claustrophobia, we will provide the patient cognitive behavioral therapy and desensitization. Oral appliances use will be discussed with the patient in case of mild to moderate sleep apnea or if the patient with severe disease fail positive airway pressure treatment.       The patient was extensively educated on the consequences of untreated obstructive sleep apnea namely cardiovascular/metabolic disorder, neurocognitive deficit, daytime sleepiness, motor vehicle accidents, depression, mood disorders and reduced quality of life.  At the end of conversation, the patient voices understanding of the disease process and treatment modality.  Patient also understands the risk of untreated obstructive sleep apnea and benefit  benefits of the treatment.    Counseling time was greater than 10 minutes.    We discussed diet and exercise.    Follow Up   Return in about 1 year (around 10/24/2025).  Patient was given instructions and counseling regarding her condition or for health maintenance advice. Please see specific information pulled into the AVS if appropriate.

## 2024-10-30 ENCOUNTER — TELEPHONE (OUTPATIENT)
Dept: PULMONOLOGY | Facility: CLINIC | Age: 54
End: 2024-10-30
Payer: COMMERCIAL

## 2024-10-30 NOTE — TELEPHONE ENCOUNTER
----- Message from Adelaida Pugh sent at 10/29/2024  1:46 PM EDT -----  She hasn't had a PFT since 2021, She is going to need to come in and do a spirometry.  ----- Message -----  From: Dina Smiley MA  Sent: 10/29/2024  11:32 AM EDT  To: SOFY Rangel is needing pulmonary clearance for a cystoscopy/uteroscopy laser litho and pylerography on 11/5 on this pt.       For me [id number] clearance and all relevant testing.  
Yes

## 2024-11-04 DIAGNOSIS — Z01.818 PRE-OP EXAM: Primary | ICD-10-CM

## 2024-11-04 LAB
FEV1/FVC: 86 %
FEV1: 2.35 LITERS
FVC VOL RESPIRATORY: 2.74 LITERS

## 2024-11-04 PROCEDURE — 94010 BREATHING CAPACITY TEST: CPT | Performed by: NURSE PRACTITIONER

## 2024-11-04 ASSESSMENT — PULMONARY FUNCTION TESTS
FEV1: 2.35
FEV1/FVC: 86
FVC: 2.74